# Patient Record
Sex: MALE | Race: WHITE | Employment: FULL TIME | URBAN - METROPOLITAN AREA
[De-identification: names, ages, dates, MRNs, and addresses within clinical notes are randomized per-mention and may not be internally consistent; named-entity substitution may affect disease eponyms.]

---

## 2018-10-13 ENCOUNTER — APPOINTMENT (OUTPATIENT)
Dept: GENERAL RADIOLOGY | Age: 48
DRG: 249 | End: 2018-10-13
Payer: COMMERCIAL

## 2018-10-13 ENCOUNTER — HOSPITAL ENCOUNTER (INPATIENT)
Age: 48
LOS: 3 days | Discharge: HOME | DRG: 249 | End: 2018-10-16
Attending: EMERGENCY MEDICINE | Admitting: INTERNAL MEDICINE
Payer: COMMERCIAL

## 2018-10-13 DIAGNOSIS — F17.200 TOBACCO USE DISORDER: ICD-10-CM

## 2018-10-13 DIAGNOSIS — M79.602 LEFT ARM PAIN: ICD-10-CM

## 2018-10-13 DIAGNOSIS — R77.8 ELEVATED TROPONIN: ICD-10-CM

## 2018-10-13 DIAGNOSIS — R07.9 CHEST PAIN, UNSPECIFIED TYPE: Primary | ICD-10-CM

## 2018-10-13 PROBLEM — I21.4 NSTEMI (NON-ST ELEVATED MYOCARDIAL INFARCTION) (HCC): Status: ACTIVE | Noted: 2018-10-13

## 2018-10-13 LAB
A/G RATIO: 1.5 (ref 1.1–2.2)
ALBUMIN SERPL-MCNC: 4.3 G/DL (ref 3.4–5)
ALP BLD-CCNC: 79 U/L (ref 40–129)
ALT SERPL-CCNC: 20 U/L (ref 10–40)
ANION GAP SERPL CALCULATED.3IONS-SCNC: 10 MMOL/L (ref 3–16)
AST SERPL-CCNC: 22 U/L (ref 15–37)
BASOPHILS ABSOLUTE: 0.1 K/UL (ref 0–0.2)
BASOPHILS RELATIVE PERCENT: 0.9 %
BILIRUB SERPL-MCNC: 0.3 MG/DL (ref 0–1)
BUN BLDV-MCNC: 16 MG/DL (ref 7–20)
CALCIUM SERPL-MCNC: 9.5 MG/DL (ref 8.3–10.6)
CHLORIDE BLD-SCNC: 101 MMOL/L (ref 99–110)
CO2: 29 MMOL/L (ref 21–32)
CREAT SERPL-MCNC: 0.8 MG/DL (ref 0.9–1.3)
EOSINOPHILS ABSOLUTE: 0.2 K/UL (ref 0–0.6)
EOSINOPHILS RELATIVE PERCENT: 1.4 %
GFR AFRICAN AMERICAN: >60
GFR NON-AFRICAN AMERICAN: >60
GLOBULIN: 2.9 G/DL
GLUCOSE BLD-MCNC: 91 MG/DL (ref 70–99)
HCT VFR BLD CALC: 41.5 % (ref 40.5–52.5)
HEMOGLOBIN: 14.2 G/DL (ref 13.5–17.5)
LYMPHOCYTES ABSOLUTE: 2 K/UL (ref 1–5.1)
LYMPHOCYTES RELATIVE PERCENT: 18 %
MAGNESIUM: 2.1 MG/DL (ref 1.8–2.4)
MCH RBC QN AUTO: 29.4 PG (ref 26–34)
MCHC RBC AUTO-ENTMCNC: 34.2 G/DL (ref 31–36)
MCV RBC AUTO: 85.9 FL (ref 80–100)
MONOCYTES ABSOLUTE: 0.6 K/UL (ref 0–1.3)
MONOCYTES RELATIVE PERCENT: 5.2 %
NEUTROPHILS ABSOLUTE: 8.2 K/UL (ref 1.7–7.7)
NEUTROPHILS RELATIVE PERCENT: 74.5 %
PDW BLD-RTO: 13.5 % (ref 12.4–15.4)
PHOSPHORUS: 3.1 MG/DL (ref 2.5–4.9)
PLATELET # BLD: 159 K/UL (ref 135–450)
PMV BLD AUTO: 9 FL (ref 5–10.5)
POTASSIUM SERPL-SCNC: 3.9 MMOL/L (ref 3.5–5.1)
RBC # BLD: 4.82 M/UL (ref 4.2–5.9)
SODIUM BLD-SCNC: 140 MMOL/L (ref 136–145)
TOTAL PROTEIN: 7.2 G/DL (ref 6.4–8.2)
TROPONIN: 0.04 NG/ML
TROPONIN: 0.13 NG/ML
TROPONIN: <0.01 NG/ML
WBC # BLD: 11 K/UL (ref 4–11)

## 2018-10-13 PROCEDURE — 84100 ASSAY OF PHOSPHORUS: CPT

## 2018-10-13 PROCEDURE — 36415 COLL VENOUS BLD VENIPUNCTURE: CPT

## 2018-10-13 PROCEDURE — 6370000000 HC RX 637 (ALT 250 FOR IP): Performed by: EMERGENCY MEDICINE

## 2018-10-13 PROCEDURE — 99285 EMERGENCY DEPT VISIT HI MDM: CPT

## 2018-10-13 PROCEDURE — 80053 COMPREHEN METABOLIC PANEL: CPT

## 2018-10-13 PROCEDURE — 84484 ASSAY OF TROPONIN QUANT: CPT

## 2018-10-13 PROCEDURE — 93005 ELECTROCARDIOGRAM TRACING: CPT | Performed by: NURSE PRACTITIONER

## 2018-10-13 PROCEDURE — 71046 X-RAY EXAM CHEST 2 VIEWS: CPT

## 2018-10-13 PROCEDURE — 6370000000 HC RX 637 (ALT 250 FOR IP): Performed by: NURSE PRACTITIONER

## 2018-10-13 PROCEDURE — 2580000003 HC RX 258: Performed by: EMERGENCY MEDICINE

## 2018-10-13 PROCEDURE — 93005 ELECTROCARDIOGRAM TRACING: CPT | Performed by: EMERGENCY MEDICINE

## 2018-10-13 PROCEDURE — 1200000000 HC SEMI PRIVATE

## 2018-10-13 PROCEDURE — 85025 COMPLETE CBC W/AUTO DIFF WBC: CPT

## 2018-10-13 PROCEDURE — 83735 ASSAY OF MAGNESIUM: CPT

## 2018-10-13 RX ORDER — ASPIRIN 81 MG/1
324 TABLET, CHEWABLE ORAL ONCE
Status: COMPLETED | OUTPATIENT
Start: 2018-10-13 | End: 2018-10-13

## 2018-10-13 RX ORDER — ACETAMINOPHEN 325 MG/1
650 TABLET ORAL ONCE
Status: COMPLETED | OUTPATIENT
Start: 2018-10-13 | End: 2018-10-13

## 2018-10-13 RX ORDER — ASPIRIN 81 MG/1
81 TABLET, CHEWABLE ORAL DAILY
Status: DISCONTINUED | OUTPATIENT
Start: 2018-10-14 | End: 2018-10-16 | Stop reason: HOSPADM

## 2018-10-13 RX ORDER — ATORVASTATIN CALCIUM 40 MG/1
40 TABLET, FILM COATED ORAL NIGHTLY
Status: DISCONTINUED | OUTPATIENT
Start: 2018-10-13 | End: 2018-10-16 | Stop reason: HOSPADM

## 2018-10-13 RX ORDER — NICOTINE 21 MG/24HR
1 PATCH, TRANSDERMAL 24 HOURS TRANSDERMAL DAILY
Status: DISCONTINUED | OUTPATIENT
Start: 2018-10-13 | End: 2018-10-13

## 2018-10-13 RX ORDER — SODIUM CHLORIDE 0.9 % (FLUSH) 0.9 %
10 SYRINGE (ML) INJECTION EVERY 12 HOURS SCHEDULED
Status: DISCONTINUED | OUTPATIENT
Start: 2018-10-13 | End: 2018-10-16 | Stop reason: HOSPADM

## 2018-10-13 RX ORDER — ONDANSETRON 2 MG/ML
4 INJECTION INTRAMUSCULAR; INTRAVENOUS EVERY 6 HOURS PRN
Status: DISCONTINUED | OUTPATIENT
Start: 2018-10-13 | End: 2018-10-16 | Stop reason: HOSPADM

## 2018-10-13 RX ORDER — NITROGLYCERIN 0.4 MG/1
0.4 TABLET SUBLINGUAL EVERY 5 MIN PRN
Status: DISCONTINUED | OUTPATIENT
Start: 2018-10-13 | End: 2018-10-16 | Stop reason: HOSPADM

## 2018-10-13 RX ORDER — 0.9 % SODIUM CHLORIDE 0.9 %
500 INTRAVENOUS SOLUTION INTRAVENOUS ONCE
Status: COMPLETED | OUTPATIENT
Start: 2018-10-13 | End: 2018-10-13

## 2018-10-13 RX ORDER — NITROGLYCERIN 0.4 MG/1
0.4 TABLET SUBLINGUAL ONCE
Status: COMPLETED | OUTPATIENT
Start: 2018-10-13 | End: 2018-10-13

## 2018-10-13 RX ORDER — ALPRAZOLAM 1 MG/1
1 TABLET ORAL NIGHTLY PRN
Status: DISCONTINUED | OUTPATIENT
Start: 2018-10-13 | End: 2018-10-14

## 2018-10-13 RX ORDER — SODIUM CHLORIDE 0.9 % (FLUSH) 0.9 %
10 SYRINGE (ML) INJECTION PRN
Status: DISCONTINUED | OUTPATIENT
Start: 2018-10-13 | End: 2018-10-16 | Stop reason: HOSPADM

## 2018-10-13 RX ORDER — CLOPIDOGREL BISULFATE 75 MG/1
300 TABLET ORAL ONCE
Status: DISCONTINUED | OUTPATIENT
Start: 2018-10-13 | End: 2018-10-16 | Stop reason: HOSPADM

## 2018-10-13 RX ORDER — MORPHINE SULFATE 2 MG/ML
2 INJECTION, SOLUTION INTRAMUSCULAR; INTRAVENOUS EVERY 4 HOURS PRN
Status: DISCONTINUED | OUTPATIENT
Start: 2018-10-13 | End: 2018-10-15 | Stop reason: SDUPTHER

## 2018-10-13 RX ORDER — ZOLPIDEM TARTRATE 5 MG/1
10 TABLET ORAL ONCE
Status: COMPLETED | OUTPATIENT
Start: 2018-10-14 | End: 2018-10-14

## 2018-10-13 RX ORDER — OXYCODONE AND ACETAMINOPHEN 7.5; 325 MG/1; MG/1
1 TABLET ORAL EVERY 6 HOURS PRN
Status: DISCONTINUED | OUTPATIENT
Start: 2018-10-13 | End: 2018-10-16 | Stop reason: HOSPADM

## 2018-10-13 RX ADMIN — ACETAMINOPHEN 650 MG: 325 TABLET ORAL at 22:38

## 2018-10-13 RX ADMIN — NITROGLYCERIN 0.4 MG: 0.4 TABLET, ORALLY DISINTEGRATING SUBLINGUAL at 18:40

## 2018-10-13 RX ADMIN — ASPIRIN 324 MG: 81 TABLET, CHEWABLE ORAL at 18:31

## 2018-10-13 RX ADMIN — NITROGLYCERIN 0.5 INCH: 20 OINTMENT TOPICAL at 21:58

## 2018-10-13 RX ADMIN — SODIUM CHLORIDE 500 ML: 9 INJECTION, SOLUTION INTRAVENOUS at 18:32

## 2018-10-13 ASSESSMENT — PAIN DESCRIPTION - PAIN TYPE: TYPE: ACUTE PAIN

## 2018-10-13 ASSESSMENT — PAIN DESCRIPTION - ORIENTATION: ORIENTATION: LEFT

## 2018-10-13 ASSESSMENT — PAIN SCALES - GENERAL
PAINLEVEL_OUTOF10: 0
PAINLEVEL_OUTOF10: 8
PAINLEVEL_OUTOF10: 0
PAINLEVEL_OUTOF10: 6

## 2018-10-13 ASSESSMENT — PAIN SCALES - WONG BAKER: WONGBAKER_NUMERICALRESPONSE: 0

## 2018-10-13 ASSESSMENT — PAIN DESCRIPTION - LOCATION: LOCATION: ARM

## 2018-10-13 NOTE — ED NOTES
Pt was given 1 nitro sublingual and pain dropped from 5 to 1 out of 10. . Dr Alycia Germain notified. . Pt with no needs at this time     Kimberly Velarde RN  10/13/18 0193

## 2018-10-13 NOTE — ED PROVIDER NOTES
[facility] Emergency Department    CHIEF COMPLAINT  Chest Pain (started 1530 while shopping. ) and Arm Pain (left arm)      HISTORY OF PRESENT ILLNESS  Antonieta Reyes is a 57 y.o. male who presents to the ED complaining of left anterior chest pain that radiates to left arm onset today while shopping. Patient eyes any dizziness or lightheadedness. Patient's wife at bedside reports he has been \"tired lately. \" Patient reports that with onset of symptoms he was \"seeing spots. \" Patient reports that pain has been intermittently in waves since about 3/3/30 this afternoon. Patient denies any headache. No fevers, chills, or episodes of diaphoresis. Patient denies any nausea, vomiting, or diarrhea. No unilateral weakness. Patient reports tingling and pain in left arm. Patient denies any neck or back pain. Patient reports feeling \"winded\" with symptoms. Patient denies any similar previous episodes. No abdominal pain/discomfort. No dysuria, hematuria, urinary urgency, frequency, or retention. Patient reports smoking 3/4 ppd. No other complaints, modifying factors or associated symptoms. Nursing notes reviewed. Past Medical History:   Diagnosis Date    Chronic pain syndrome     Current smoker     DDD (degenerative disc disease), lumbar     Facet joint syndrome ([facility] [address]     Hypertension     (does not take meds per wife as instructed)     IgA nephropathy     Insomnia     Neuropathic pain     Pain management contract agreement     with Dr. Stacey Hoskins     History reviewed. No pertinent surgical history. Family History   Problem Relation Age of Onset    High Blood Pressure Mother     High Blood Pressure Daughter      Social History     Social History    Marital status:      Spouse name: N/A    Number of children: N/A    Years of education: N/A     Occupational History    Not on file.      Social History Main Topics    Smoking status: Current Every Day Smoker # 0.2 0.0 - 0.6 K/uL    Basophils # 0.1 0.0 - 0.2 K/uL   Comprehensive Metabolic Panel   Result Value Ref Range    Sodium 140 136 - 145 mmol/L    Potassium 3.9 3.5 - 5.1 mmol/L    Chloride 101 99 - 110 mmol/L    CO2 29 21 - 32 mmol/L    Anion Gap 10 3 - 16    Glucose 91 70 - 99 mg/dL    BUN 16 7 - 20 mg/dL    CREATININE 0.8 (L) 0.9 - 1.3 mg/dL    GFR Non-African American >60 >60    GFR African American >60 >60    Calcium 9.5 8.3 - 10.6 mg/dL    Total Protein 7.2 6.4 - 8.2 g/dL    Alb 4.3 3.4 - 5.0 g/dL    Albumin/Globulin Ratio 1.5 1.1 - 2.2    Total Bilirubin 0.3 0.0 - 1.0 mg/dL    Alkaline Phosphatase 79 40 - 129 U/L    ALT 20 10 - 40 U/L    AST 22 15 - 37 U/L    Globulin 2.9 g/dL   Troponin   Result Value Ref Range    Troponin <0.01 <0.01 ng/mL   Magnesium   Result Value Ref Range    Magnesium 2.10 1.80 - 2.40 mg/dL   Phosphorus   Result Value Ref Range    Phosphorus 3.1 2.5 - 4.9 mg/dL   Troponin   Result Value Ref Range    Troponin 0.04 (H) <0.01 ng/mL   EKG 12 Lead   Result Value Ref Range    Ventricular Rate 47 BPM    Atrial Rate 47 BPM    P-R Interval 152 ms    QRS Duration 88 ms    Q-T Interval 452 ms    QTc Calculation (Bazett) 400 ms    P Axis 61 degrees    R Axis 30 degrees    T Axis 32 degrees    Diagnosis       Marked sinus bradycardiaAbnormal ECGWhen compared with ECG of 29-JUL-2014 03:39,Previous ECG has undetermined rhythm, needs review       I spoke with Dr. Pee Sher and Dr. Gemma Hamilton spoke with Dr. Bradley Montgomery with cardiology. We thoroughly discussed the history, physical exam, laboratory and imaging studies, as well as, emergency department course. Based upon that discussion, we've decided to admit Santos Nunez for further observation and evaluation of Elisha Doyne B Volkering's chest pain.   As I have deemed necessary from their history, physical, and studies, I have considered and evaluated Santos Nunez for the following diagnoses:  ACUTE CORONARY SYNDROME, PERICARDIAL TAMPONADE, PNEUMOTHORAX, PULMONARY EMBOLISM, and THORACIC DISSECTION. FINAL IMPRESSION  1. Chest pain, unspecified type    2. Elevated troponin    3. Tobacco use disorder    4. Left arm pain        Vitals:  Blood pressure 111/66, pulse 53, temperature 97.7 °F (36.5 °C), temperature source Oral, resp. rate 16, height 5' 8\" (1.727 m), weight 150 lb (68 kg), SpO2 100 %. DISPOSITION  Patient was admitted in stable good condition.           Eleanor Nelson, MARGUERITE - CNP  10/13/18 8125

## 2018-10-13 NOTE — ED PROVIDER NOTES
evidence of acute ischemia. No significant change from prior EKG dated earlier today       HEART SCORE:  History: +1 for moderate suspicion  EKG: +1 for nonspecific changes   Age: +1 for age 44-72 years  Risk factors (includes HLD, HTN, DM, tobacco use, obesity, and +FHx): +1 for 1-2 risk factors  Initial troponin: +0 for negative troponin    Heart score: 4. This falls under the following category: Score of 4-6, which indicates low/moderate risk for major adverse cardiac event and supports observation with repeated troponins and/or non-invasive testing      CXR - no acute dz    Lab reviewed - significant for first troponin < 0.01.  2nd troponin 0.04. CBC, CMP WNL. Patient ruled in. He needs admission for further evaluation. Patient initially did not want to be admitted and wanted to leave AMA. After prolonged discussion between myself, the patient and his wife, patient finally agreed to stay. The entire conversation was witnessed by nurse, Cathy Nixon. Patient's only request is that he gets a nicotine patch. NP spoke with hospitalist, Dr. Kaylee Lamas. Dr. Kaylee Lamas accepted the admission but requested we remove the nicotine patch. Cardiology consulted. I spoke with Dr. Karin Arriaza. Agree with admission. Also states patient can have the nicotine patch if that means the patient will stay. For further details of [address] emergency department encounter, please see Nathalie sOorio NP's documentation. This chart was generated in part by using Dragon Dictation system and may contain errors related to that system including errors in grammar, punctuation, and spelling, as well as words and phrases that may be inappropriate. If there are any questions or concerns please feel free to contact the dictating provider for clarification.           Zi Garcia MD  10/13/18 6309

## 2018-10-14 LAB
CHOLESTEROL, TOTAL: 168 MG/DL (ref 0–199)
EKG ATRIAL RATE: 46 BPM
EKG ATRIAL RATE: 47 BPM
EKG ATRIAL RATE: 51 BPM
EKG DIAGNOSIS: NORMAL
EKG P AXIS: 55 DEGREES
EKG P AXIS: 61 DEGREES
EKG P AXIS: 72 DEGREES
EKG P-R INTERVAL: 116 MS
EKG P-R INTERVAL: 146 MS
EKG P-R INTERVAL: 152 MS
EKG Q-T INTERVAL: 444 MS
EKG Q-T INTERVAL: 452 MS
EKG Q-T INTERVAL: 452 MS
EKG QRS DURATION: 88 MS
EKG QRS DURATION: 90 MS
EKG QRS DURATION: 90 MS
EKG QTC CALCULATION (BAZETT): 388 MS
EKG QTC CALCULATION (BAZETT): 400 MS
EKG QTC CALCULATION (BAZETT): 416 MS
EKG R AXIS: 30 DEGREES
EKG R AXIS: 48 DEGREES
EKG R AXIS: 67 DEGREES
EKG T AXIS: 28 DEGREES
EKG T AXIS: 32 DEGREES
EKG T AXIS: 63 DEGREES
EKG VENTRICULAR RATE: 46 BPM
EKG VENTRICULAR RATE: 47 BPM
EKG VENTRICULAR RATE: 51 BPM
HDLC SERPL-MCNC: 34 MG/DL (ref 40–60)
LDL CHOLESTEROL CALCULATED: 103 MG/DL
TRIGL SERPL-MCNC: 155 MG/DL (ref 0–150)
TROPONIN: 0.35 NG/ML
TROPONIN: 0.46 NG/ML
TROPONIN: 0.48 NG/ML
VLDLC SERPL CALC-MCNC: 31 MG/DL

## 2018-10-14 PROCEDURE — 99255 IP/OBS CONSLTJ NEW/EST HI 80: CPT | Performed by: INTERNAL MEDICINE

## 2018-10-14 PROCEDURE — 2580000003 HC RX 258: Performed by: INTERNAL MEDICINE

## 2018-10-14 PROCEDURE — 6370000000 HC RX 637 (ALT 250 FOR IP): Performed by: INTERNAL MEDICINE

## 2018-10-14 PROCEDURE — 84484 ASSAY OF TROPONIN QUANT: CPT

## 2018-10-14 PROCEDURE — 93005 ELECTROCARDIOGRAM TRACING: CPT | Performed by: INTERNAL MEDICINE

## 2018-10-14 PROCEDURE — 1200000000 HC SEMI PRIVATE

## 2018-10-14 PROCEDURE — 93010 ELECTROCARDIOGRAM REPORT: CPT | Performed by: INTERNAL MEDICINE

## 2018-10-14 PROCEDURE — 6360000002 HC RX W HCPCS: Performed by: INTERNAL MEDICINE

## 2018-10-14 PROCEDURE — 36415 COLL VENOUS BLD VENIPUNCTURE: CPT

## 2018-10-14 PROCEDURE — 80061 LIPID PANEL: CPT

## 2018-10-14 PROCEDURE — 6370000000 HC RX 637 (ALT 250 FOR IP): Performed by: NURSE PRACTITIONER

## 2018-10-14 RX ORDER — NICOTINE 21 MG/24HR
1 PATCH, TRANSDERMAL 24 HOURS TRANSDERMAL DAILY
Status: DISCONTINUED | OUTPATIENT
Start: 2018-10-14 | End: 2018-10-16 | Stop reason: HOSPADM

## 2018-10-14 RX ORDER — ZOLPIDEM TARTRATE 5 MG/1
10 TABLET ORAL NIGHTLY PRN
Status: DISCONTINUED | OUTPATIENT
Start: 2018-10-14 | End: 2018-10-16 | Stop reason: HOSPADM

## 2018-10-14 RX ORDER — ACETAMINOPHEN 325 MG/1
650 TABLET ORAL EVERY 4 HOURS PRN
Status: DISCONTINUED | OUTPATIENT
Start: 2018-10-14 | End: 2018-10-16 | Stop reason: HOSPADM

## 2018-10-14 RX ORDER — ZOLPIDEM TARTRATE 5 MG/1
10 TABLET ORAL NIGHTLY PRN
Status: DISCONTINUED | OUTPATIENT
Start: 2018-10-15 | End: 2018-10-14

## 2018-10-14 RX ADMIN — ZOLPIDEM TARTRATE 10 MG: 5 TABLET ORAL at 00:05

## 2018-10-14 RX ADMIN — OXYCODONE HYDROCHLORIDE AND ACETAMINOPHEN 1 TABLET: 7.5; 325 TABLET ORAL at 04:30

## 2018-10-14 RX ADMIN — ATORVASTATIN CALCIUM 40 MG: 40 TABLET, FILM COATED ORAL at 00:05

## 2018-10-14 RX ADMIN — SODIUM CHLORIDE, PRESERVATIVE FREE 10 ML: 5 INJECTION INTRAVENOUS at 00:05

## 2018-10-14 RX ADMIN — ENOXAPARIN SODIUM 70 MG: 80 INJECTION SUBCUTANEOUS at 22:01

## 2018-10-14 RX ADMIN — ENOXAPARIN SODIUM 70 MG: 80 INJECTION SUBCUTANEOUS at 13:50

## 2018-10-14 RX ADMIN — OXYCODONE HYDROCHLORIDE AND ACETAMINOPHEN 1 TABLET: 7.5; 325 TABLET ORAL at 22:01

## 2018-10-14 RX ADMIN — ZOLPIDEM TARTRATE 10 MG: 5 TABLET ORAL at 22:01

## 2018-10-14 RX ADMIN — OXYCODONE HYDROCHLORIDE AND ACETAMINOPHEN 1 TABLET: 7.5; 325 TABLET ORAL at 12:26

## 2018-10-14 RX ADMIN — SODIUM CHLORIDE, PRESERVATIVE FREE 10 ML: 5 INJECTION INTRAVENOUS at 07:52

## 2018-10-14 RX ADMIN — ENOXAPARIN SODIUM 70 MG: 80 INJECTION SUBCUTANEOUS at 00:05

## 2018-10-14 RX ADMIN — SODIUM CHLORIDE, PRESERVATIVE FREE 10 ML: 5 INJECTION INTRAVENOUS at 22:02

## 2018-10-14 RX ADMIN — ATORVASTATIN CALCIUM 40 MG: 40 TABLET, FILM COATED ORAL at 22:01

## 2018-10-14 RX ADMIN — ASPIRIN 81 MG: 81 TABLET, CHEWABLE ORAL at 07:52

## 2018-10-14 RX ADMIN — ACETAMINOPHEN 650 MG: 325 TABLET ORAL at 18:32

## 2018-10-14 ASSESSMENT — PAIN SCALES - GENERAL
PAINLEVEL_OUTOF10: 3
PAINLEVEL_OUTOF10: 0
PAINLEVEL_OUTOF10: 6
PAINLEVEL_OUTOF10: 4
PAINLEVEL_OUTOF10: 0
PAINLEVEL_OUTOF10: 0
PAINLEVEL_OUTOF10: 10
PAINLEVEL_OUTOF10: 0
PAINLEVEL_OUTOF10: 3
PAINLEVEL_OUTOF10: 0

## 2018-10-14 ASSESSMENT — PAIN DESCRIPTION - ONSET
ONSET: ON-GOING
ONSET: ON-GOING

## 2018-10-14 ASSESSMENT — PAIN DESCRIPTION - DESCRIPTORS
DESCRIPTORS: ACHING;SORE
DESCRIPTORS: ACHING;SORE

## 2018-10-14 ASSESSMENT — PAIN DESCRIPTION - FREQUENCY
FREQUENCY: CONTINUOUS
FREQUENCY: CONTINUOUS

## 2018-10-14 ASSESSMENT — PAIN DESCRIPTION - PAIN TYPE
TYPE: CHRONIC PAIN

## 2018-10-14 ASSESSMENT — PAIN DESCRIPTION - LOCATION
LOCATION: GENERALIZED

## 2018-10-14 ASSESSMENT — PAIN DESCRIPTION - PROGRESSION
CLINICAL_PROGRESSION: NOT CHANGED
CLINICAL_PROGRESSION: NOT CHANGED

## 2018-10-14 NOTE — CONSULTS
a current everyday cigarette smoker. He does not consume alcohol at this time. FAMILY HISTORY:  Remarkable for hypertension and coronary artery  disease in the mother. REVIEW OF SYSTEMS:  Demonstrates no fever or chills. He has had a 30  to 40 pound weight loss over the past several months, which he  attributes to working hard and eating less. He has not had near  syncope or syncope. He does not describe palpitations. His  functional status is excellent. All other components of the 10-system  review are negative. PHYSICAL EXAMINATION:  VITAL SIGNS:  Blood pressure is 102/83 and heart rate is 55 beats per  minute and regular. The patient is afebrile. GENERAL:  He is awake, alert, and oriented, and in no discomfort. HEENT:  Exam demonstrates normocephalic and atraumatic head. There is  no scleral icterus. Pupils are round and reactive. NECK:  Supple without thyromegaly. LUNGS:  Clear to auscultation. CARDIOVASCULAR:  Exam reveals a regular rhythm. The apical impulse is  discrete. S1 and S2 are normal.  There is no audible murmur or  gallop. The jugular venous pressure is difficult to assess. ABDOMEN:  The abdomen is lean, soft, and nontender. EXTREMITIES:  Demonstrate no pitting or pretibial dependent edema. There is no cyanosis. There is no evidence for chronic venous stasis. SKIN:  Otherwise, warm and dry without skin rash. DIAGNOSTIC DATA:  A 12-lead ECG from 10/14/2018 demonstrates sinus  bradycardia at 51 beats per minute. There are some minor early  repolarization changes, but no evidence for acute myocardial injury or  ischemia. IMPRESSION:  1.  Non-ST elevation myocardial infarction. 2.  Hypertension. 3.  Hyperlipidemia. 4.  Cigarette smoking. 5.  Family history of coronary artery disease. The patient presents with an episode of self-limiting chest pain on  10/13/2018. He does have an ascending trajectory for his troponin  levels.   He does not have ECG evidence

## 2018-10-14 NOTE — PROGRESS NOTES

## 2018-10-14 NOTE — ED NOTES
Pt aggreeable to stay. . Pt requested IV be moved to different site. . This RN removed IV and inserted new IV. Duane Bourdon Pt requested Nicotine patch. . NP notified and patch ordered and applied      Carolynn Jeff RN  10/13/18 0897

## 2018-10-14 NOTE — PLAN OF CARE
Problem: Pain:  Goal: Patient's pain/discomfort is manageable  Patient's pain/discomfort is manageable   Outcome: Met This Shift  Pt denies chest pain this shift. Encouraged to report pain to nursing staff as needed.

## 2018-10-15 LAB
APTT: 46.3 SEC (ref 26–36)
EKG ATRIAL RATE: 54 BPM
EKG DIAGNOSIS: NORMAL
EKG P AXIS: 55 DEGREES
EKG P-R INTERVAL: 146 MS
EKG Q-T INTERVAL: 430 MS
EKG QRS DURATION: 80 MS
EKG QTC CALCULATION (BAZETT): 407 MS
EKG R AXIS: 46 DEGREES
EKG T AXIS: 60 DEGREES
EKG VENTRICULAR RATE: 54 BPM
HCT VFR BLD CALC: 40.8 % (ref 40.5–52.5)
HEMOGLOBIN: 14.2 G/DL (ref 13.5–17.5)
LV EF: 55 %
LVEF MODALITY: NORMAL
MCH RBC QN AUTO: 29.7 PG (ref 26–34)
MCHC RBC AUTO-ENTMCNC: 34.9 G/DL (ref 31–36)
MCV RBC AUTO: 85.3 FL (ref 80–100)
PDW BLD-RTO: 13.4 % (ref 12.4–15.4)
PLATELET # BLD: 151 K/UL (ref 135–450)
PMV BLD AUTO: 9.8 FL (ref 5–10.5)
POC ACT LR: 165 SEC
POC ACT LR: 203 SEC
POC ACT LR: 271 SEC
RBC # BLD: 4.79 M/UL (ref 4.2–5.9)
TROPONIN: 0.72 NG/ML
TROPONIN: 0.73 NG/ML
WBC # BLD: 8.4 K/UL (ref 4–11)

## 2018-10-15 PROCEDURE — 2709999900 HC NON-CHARGEABLE SUPPLY

## 2018-10-15 PROCEDURE — 4A023N7 MEASUREMENT OF CARDIAC SAMPLING AND PRESSURE, LEFT HEART, PERCUTANEOUS APPROACH: ICD-10-PCS | Performed by: INTERNAL MEDICINE

## 2018-10-15 PROCEDURE — 2580000003 HC RX 258: Performed by: INTERNAL MEDICINE

## 2018-10-15 PROCEDURE — 2500000003 HC RX 250 WO HCPCS

## 2018-10-15 PROCEDURE — 93306 TTE W/DOPPLER COMPLETE: CPT

## 2018-10-15 PROCEDURE — 94664 DEMO&/EVAL PT USE INHALER: CPT

## 2018-10-15 PROCEDURE — C1725 CATH, TRANSLUMIN NON-LASER: HCPCS

## 2018-10-15 PROCEDURE — C1894 INTRO/SHEATH, NON-LASER: HCPCS

## 2018-10-15 PROCEDURE — 6360000004 HC RX CONTRAST MEDICATION: Performed by: INTERNAL MEDICINE

## 2018-10-15 PROCEDURE — 92928 PRQ TCAT PLMT NTRAC ST 1 LES: CPT | Performed by: INTERNAL MEDICINE

## 2018-10-15 PROCEDURE — 93458 L HRT ARTERY/VENTRICLE ANGIO: CPT | Performed by: INTERNAL MEDICINE

## 2018-10-15 PROCEDURE — 02703DZ DILATION OF CORONARY ARTERY, ONE ARTERY WITH INTRALUMINAL DEVICE, PERCUTANEOUS APPROACH: ICD-10-PCS | Performed by: INTERNAL MEDICINE

## 2018-10-15 PROCEDURE — 6370000000 HC RX 637 (ALT 250 FOR IP): Performed by: INTERNAL MEDICINE

## 2018-10-15 PROCEDURE — 84484 ASSAY OF TROPONIN QUANT: CPT

## 2018-10-15 PROCEDURE — 92941 PRQ TRLML REVSC TOT OCCL AMI: CPT | Performed by: INTERNAL MEDICINE

## 2018-10-15 PROCEDURE — 2060000000 HC ICU INTERMEDIATE R&B

## 2018-10-15 PROCEDURE — 99152 MOD SED SAME PHYS/QHP 5/>YRS: CPT | Performed by: INTERNAL MEDICINE

## 2018-10-15 PROCEDURE — 6360000002 HC RX W HCPCS: Performed by: INTERNAL MEDICINE

## 2018-10-15 PROCEDURE — C1769 GUIDE WIRE: HCPCS

## 2018-10-15 PROCEDURE — B2111ZZ FLUOROSCOPY OF MULTIPLE CORONARY ARTERIES USING LOW OSMOLAR CONTRAST: ICD-10-PCS | Performed by: INTERNAL MEDICINE

## 2018-10-15 PROCEDURE — 85347 COAGULATION TIME ACTIVATED: CPT

## 2018-10-15 PROCEDURE — C1887 CATHETER, GUIDING: HCPCS

## 2018-10-15 PROCEDURE — 99406 BEHAV CHNG SMOKING 3-10 MIN: CPT

## 2018-10-15 PROCEDURE — 85027 COMPLETE CBC AUTOMATED: CPT

## 2018-10-15 PROCEDURE — 6370000000 HC RX 637 (ALT 250 FOR IP)

## 2018-10-15 PROCEDURE — 6360000002 HC RX W HCPCS

## 2018-10-15 PROCEDURE — 93005 ELECTROCARDIOGRAM TRACING: CPT | Performed by: INTERNAL MEDICINE

## 2018-10-15 PROCEDURE — 36415 COLL VENOUS BLD VENIPUNCTURE: CPT

## 2018-10-15 PROCEDURE — 6370000000 HC RX 637 (ALT 250 FOR IP): Performed by: NURSE PRACTITIONER

## 2018-10-15 PROCEDURE — C1876 STENT, NON-COA/NON-COV W/DEL: HCPCS

## 2018-10-15 PROCEDURE — 85730 THROMBOPLASTIN TIME PARTIAL: CPT

## 2018-10-15 RX ORDER — EPTIFIBATIDE 0.75 MG/ML
2 INJECTION, SOLUTION INTRAVENOUS CONTINUOUS
Status: ACTIVE | OUTPATIENT
Start: 2018-10-15 | End: 2018-10-15

## 2018-10-15 RX ORDER — HEPARIN SODIUM 1000 [USP'U]/ML
4000 INJECTION, SOLUTION INTRAVENOUS; SUBCUTANEOUS ONCE
Status: COMPLETED | OUTPATIENT
Start: 2018-10-15 | End: 2018-10-15

## 2018-10-15 RX ORDER — HEPARIN SODIUM 1000 [USP'U]/ML
2000 INJECTION, SOLUTION INTRAVENOUS; SUBCUTANEOUS PRN
Status: DISCONTINUED | OUTPATIENT
Start: 2018-10-15 | End: 2018-10-15

## 2018-10-15 RX ORDER — SODIUM CHLORIDE 9 MG/ML
INJECTION, SOLUTION INTRAVENOUS CONTINUOUS
Status: ACTIVE | OUTPATIENT
Start: 2018-10-15 | End: 2018-10-15

## 2018-10-15 RX ORDER — HEPARIN SODIUM 1000 [USP'U]/ML
4000 INJECTION, SOLUTION INTRAVENOUS; SUBCUTANEOUS PRN
Status: DISCONTINUED | OUTPATIENT
Start: 2018-10-15 | End: 2018-10-15

## 2018-10-15 RX ORDER — CLOPIDOGREL 300 MG/1
600 TABLET, FILM COATED ORAL ONCE
Status: DISCONTINUED | OUTPATIENT
Start: 2018-10-15 | End: 2018-10-16 | Stop reason: HOSPADM

## 2018-10-15 RX ORDER — MORPHINE SULFATE 2 MG/ML
2 INJECTION, SOLUTION INTRAMUSCULAR; INTRAVENOUS EVERY 4 HOURS PRN
Status: DISCONTINUED | OUTPATIENT
Start: 2018-10-15 | End: 2018-10-16 | Stop reason: HOSPADM

## 2018-10-15 RX ORDER — CLOPIDOGREL BISULFATE 75 MG/1
75 TABLET ORAL DAILY
Status: DISCONTINUED | OUTPATIENT
Start: 2018-10-16 | End: 2018-10-16 | Stop reason: HOSPADM

## 2018-10-15 RX ADMIN — HEPARIN SODIUM 12 UNITS/KG/HR: 10000 INJECTION, SOLUTION INTRAVENOUS at 03:26

## 2018-10-15 RX ADMIN — ZOLPIDEM TARTRATE 10 MG: 5 TABLET ORAL at 22:10

## 2018-10-15 RX ADMIN — ASPIRIN 325 MG: 81 TABLET, CHEWABLE ORAL at 09:55

## 2018-10-15 RX ADMIN — EPTIFIBATIDE 2 MCG/KG/MIN: 0.75 INJECTION, SOLUTION INTRAVENOUS at 14:56

## 2018-10-15 RX ADMIN — HEPARIN SODIUM 4000 UNITS: 1000 INJECTION INTRAVENOUS; SUBCUTANEOUS at 03:27

## 2018-10-15 RX ADMIN — ATORVASTATIN CALCIUM 40 MG: 40 TABLET, FILM COATED ORAL at 22:10

## 2018-10-15 RX ADMIN — IOVERSOL 240 ML: 741 INJECTION INTRA-ARTERIAL; INTRAVENOUS at 09:43

## 2018-10-15 RX ADMIN — OXYCODONE HYDROCHLORIDE AND ACETAMINOPHEN 1 TABLET: 7.5; 325 TABLET ORAL at 22:10

## 2018-10-15 RX ADMIN — SODIUM CHLORIDE, PRESERVATIVE FREE 10 ML: 5 INJECTION INTRAVENOUS at 22:11

## 2018-10-15 RX ADMIN — ACETAMINOPHEN 650 MG: 325 TABLET ORAL at 15:45

## 2018-10-15 ASSESSMENT — PAIN SCALES - GENERAL
PAINLEVEL_OUTOF10: 0
PAINLEVEL_OUTOF10: 8
PAINLEVEL_OUTOF10: 0
PAINLEVEL_OUTOF10: 8
PAINLEVEL_OUTOF10: 1
PAINLEVEL_OUTOF10: 4

## 2018-10-15 ASSESSMENT — PAIN DESCRIPTION - LOCATION
LOCATION: HEAD
LOCATION: BACK

## 2018-10-15 ASSESSMENT — PAIN DESCRIPTION - PAIN TYPE
TYPE: ACUTE PAIN
TYPE: CHRONIC PAIN

## 2018-10-15 ASSESSMENT — PAIN DESCRIPTION - ORIENTATION: ORIENTATION: LEFT

## 2018-10-15 ASSESSMENT — PAIN DESCRIPTION - DESCRIPTORS: DESCRIPTORS: HEADACHE

## 2018-10-15 NOTE — PROCEDURES
Brief Pre-Op Note/Sedation Assessment      Celia Saldivar  1970  [id number]  [id number]  7:55 AM    Planned Procedure: Cardiac Catheterization Procedure    Post Procedure Plan: Return to same level of care    Consent: I have discussed with the patient and/or the patient representative the indication, alternatives, and the possible risks and/or complications of the planned procedure and the anesthesia methods. The patient and/or patient representative appear to understand and agree to proceed. Chief Complaint: Chest Pain/Pressure  NSTEMI      Indications for the Procedure:   CAD Presentation:  ACS <= 24 hrs  Anginal Classification within 2 weeks:  CCS IV - Inability to perform any activity without angina or angina at rest, i.e., severe limitation  NYHA Heart Failure Class within 2 weeks: No symptoms      Anti- Anginal Meds within 2 weeks:   ANTI-ANGINAL MEDS:No    Stress or Imaging Studies Performed:  None    Vital Signs:  /83   Pulse 56   Temp 97.7 °F (36.5 °C) (Oral)   Resp 14   Ht 5' 8\" (1.727 m)   Wt 154 lb 9.6 oz (70.1 kg)   SpO2 99%   BMI 23.51 kg/m²     Allergies: Allergies   Allergen Reactions    Penicillins        Past Medical History:  Past Medical History:   Diagnosis Date    Chest pain 10/13/2018    + Troponins    Chronic pain syndrome     Current smoker     DDD (degenerative disc disease), lumbar     Facet joint syndrome ([facility])     High cholesterol     Hypertension     (does not take meds per wife as instructed)     IgA nephropathy     Insomnia     Neuropathic pain     Pain management contract agreement     with Dr. Rose Lancaster         Surgical History:  History reviewed. No pertinent surgical history.       Medications:  Current Facility-Administered Medications   Medication Dose Route Frequency Provider Last Rate Last Dose    heparin (porcine) injection 4,000 Units  4,000 Units Intravenous PRN Leonora Rose MD        heparin (porcine) injection 2,000

## 2018-10-16 VITALS
OXYGEN SATURATION: 97 % | DIASTOLIC BLOOD PRESSURE: 72 MMHG | TEMPERATURE: 97.97 F | SYSTOLIC BLOOD PRESSURE: 127 MMHG | HEIGHT: 68 IN | RESPIRATION RATE: 16 BRPM | WEIGHT: 150.4 LBS | HEART RATE: 54 BPM | BODY MASS INDEX: 22.79 KG/M2

## 2018-10-16 LAB
ANION GAP SERPL CALCULATED.3IONS-SCNC: 8 MMOL/L (ref 3–16)
BUN BLDV-MCNC: 14 MG/DL (ref 7–20)
CALCIUM SERPL-MCNC: 9.2 MG/DL (ref 8.3–10.6)
CHLORIDE BLD-SCNC: 103 MMOL/L (ref 99–110)
CO2: 28 MMOL/L (ref 21–32)
CREAT SERPL-MCNC: 0.9 MG/DL (ref 0.9–1.3)
GFR AFRICAN AMERICAN: >60
GFR NON-AFRICAN AMERICAN: >60
GLUCOSE BLD-MCNC: 86 MG/DL (ref 70–99)
HCT VFR BLD CALC: 41.9 % (ref 40.5–52.5)
HEMOGLOBIN: 14.4 G/DL (ref 13.5–17.5)
MCH RBC QN AUTO: 29.4 PG (ref 26–34)
MCHC RBC AUTO-ENTMCNC: 34.4 G/DL (ref 31–36)
MCV RBC AUTO: 85.5 FL (ref 80–100)
PDW BLD-RTO: 13.5 % (ref 12.4–15.4)
PLATELET # BLD: 146 K/UL (ref 135–450)
PMV BLD AUTO: 8.5 FL (ref 5–10.5)
POTASSIUM REFLEX MAGNESIUM: 4 MMOL/L (ref 3.5–5.1)
RBC # BLD: 4.9 M/UL (ref 4.2–5.9)
SODIUM BLD-SCNC: 139 MMOL/L (ref 136–145)
WBC # BLD: 7.4 K/UL (ref 4–11)

## 2018-10-16 PROCEDURE — 6370000000 HC RX 637 (ALT 250 FOR IP): Performed by: INTERNAL MEDICINE

## 2018-10-16 PROCEDURE — 85027 COMPLETE CBC AUTOMATED: CPT

## 2018-10-16 PROCEDURE — 36415 COLL VENOUS BLD VENIPUNCTURE: CPT

## 2018-10-16 PROCEDURE — 80048 BASIC METABOLIC PNL TOTAL CA: CPT

## 2018-10-16 PROCEDURE — 99233 SBSQ HOSP IP/OBS HIGH 50: CPT | Performed by: NURSE PRACTITIONER

## 2018-10-16 PROCEDURE — 2580000003 HC RX 258: Performed by: INTERNAL MEDICINE

## 2018-10-16 RX ORDER — ASPIRIN 81 MG/1
81 TABLET, CHEWABLE ORAL DAILY
Qty: 30 TABLET | Refills: 11 | Status: SHIPPED | OUTPATIENT
Start: 2018-10-17 | End: 2019-10-11 | Stop reason: SDUPTHER

## 2018-10-16 RX ORDER — CLOPIDOGREL BISULFATE 75 MG/1
75 TABLET ORAL DAILY
Qty: 30 TABLET | Refills: 11 | Status: SHIPPED | OUTPATIENT
Start: 2018-10-17 | End: 2019-10-18 | Stop reason: SDUPTHER

## 2018-10-16 RX ORDER — ATORVASTATIN CALCIUM 40 MG/1
40 TABLET, FILM COATED ORAL NIGHTLY
Qty: 30 TABLET | Refills: 5 | Status: SHIPPED | OUTPATIENT
Start: 2018-10-16 | End: 2018-10-22 | Stop reason: ALTCHOICE

## 2018-10-16 RX ORDER — NITROGLYCERIN 0.4 MG/1
TABLET SUBLINGUAL
Qty: 25 TABLET | Refills: 3 | Status: SHIPPED | OUTPATIENT
Start: 2018-10-16

## 2018-10-16 RX ADMIN — CLOPIDOGREL BISULFATE 75 MG: 75 TABLET ORAL at 08:18

## 2018-10-16 RX ADMIN — OXYCODONE HYDROCHLORIDE AND ACETAMINOPHEN 1 TABLET: 7.5; 325 TABLET ORAL at 08:26

## 2018-10-16 RX ADMIN — ASPIRIN 81 MG: 81 TABLET, CHEWABLE ORAL at 08:18

## 2018-10-16 RX ADMIN — SODIUM CHLORIDE, PRESERVATIVE FREE 10 ML: 5 INJECTION INTRAVENOUS at 08:18

## 2018-10-16 ASSESSMENT — PAIN DESCRIPTION - PAIN TYPE: TYPE: ACUTE PAIN

## 2018-10-16 ASSESSMENT — PAIN SCALES - GENERAL
PAINLEVEL_OUTOF10: 7
PAINLEVEL_OUTOF10: 7

## 2018-10-16 ASSESSMENT — PAIN DESCRIPTION - ORIENTATION: ORIENTATION: LEFT

## 2018-10-16 ASSESSMENT — PAIN DESCRIPTION - LOCATION: LOCATION: BACK

## 2018-10-16 NOTE — PROGRESS NOTES
Pt d/c'd home. Removed right hand IV and stopped bleeding. Catheter intact. Pt tolerated well. No redness noted at site. Notified [facility] and removed tele box. Reviewed d/c instructions, home meds, and  f/u information utilizing teach-back method. Patient verbalized understanding.

## 2018-10-16 NOTE — DISCHARGE SUMMARY
extraction  2. For now, follow ostial OM1. R>B to PCI give proximity to large OM2,3  3. Follow ostial Diag 1- likely cutting balloon in future  4. Integrillin for 12 hrs  5. ASA 81mg poqday for life  6. Plavix 75mg po qday for 1 month w/o inturruption, ideally for 1 yr as possible  7.  statin as tolerated. No BB due to bradycardia- watch as outpt to start  Monitor overnight.     Smoking - Counseled Cessation, would hold off any nicotine replacement products given possible MI     Chronic pain syndrome - due to DDD, arthritis, resume Percocet when necessary     Anxiety - on when necessary Xanax      Opioid dependence in the setting of chronic pain syndrome d/t DDD and arthritis, taking Percocet at home, being treated with Percocet when necessary in hospital.    Physical Exam Performed:     /72   Pulse 54   Temp 97.97 °F (36.7 °C) (Oral)   Resp 16   Ht 5' 8\" (1.727 m)   Wt 150 lb 6.4 oz (68.2 kg)   SpO2 97%   BMI 22.87 kg/m²       General appearance:  No apparent distress, appears stated age and cooperative. HEENT:  Normal cephalic, atraumatic without obvious deformity. Pupils equal, round, and reactive to light. Extra ocular muscles intact. Conjunctivae/corneas clear. Neck: Supple, with full range of motion. No jugular venous distention. Trachea midline. Respiratory:  Normal respiratory effort. Clear to auscultation, bilaterally without Rales/Wheezes/Rhonchi. Cardiovascular:  Regular rate and rhythm with normal S1/S2 without murmurs, rubs or gallops. Abdomen: Soft, non-tender, non-distended with normal bowel sounds. Musculoskeletal:  No clubbing, cyanosis or edema bilaterally. Full range of motion without deformity. Skin: Skin color, texture, turgor normal.  No rashes or lesions. Neurologic:  Neurovascularly intact without any focal sensory/motor deficits.  Cranial nerves: II-XII intact, grossly non-focal.  Psychiatric:  Alert and oriented, thought content appropriate, normal insight  Capillary nightly as needed for Sleep. Qty: 30 tablet, Refills: 1             Time Spent on discharge is more than 30 minutes in the examination, evaluation, counseling and review of medications and discharge plan. Signed:    Sue Ludwig MD   10/16/2018      Thank you Vincent Pozo MD for the opportunity to be involved in this patient's care. If you have any questions or concerns please feel free to contact me at [phone].

## 2018-10-22 ENCOUNTER — OFFICE VISIT (OUTPATIENT)
Dept: CARDIOLOGY CLINIC | Age: 48
End: 2018-10-22
Payer: COMMERCIAL

## 2018-10-22 VITALS
HEIGHT: 68 IN | BODY MASS INDEX: 23.52 KG/M2 | HEART RATE: 70 BPM | WEIGHT: 155.2 LBS | OXYGEN SATURATION: 98 % | DIASTOLIC BLOOD PRESSURE: 66 MMHG | SYSTOLIC BLOOD PRESSURE: 100 MMHG

## 2018-10-22 DIAGNOSIS — E78.00 PURE HYPERCHOLESTEROLEMIA: ICD-10-CM

## 2018-10-22 DIAGNOSIS — I25.10 CORONARY ARTERY DISEASE INVOLVING NATIVE CORONARY ARTERY OF NATIVE HEART WITHOUT ANGINA PECTORIS: ICD-10-CM

## 2018-10-22 DIAGNOSIS — I21.4 NON-ST ELEVATION MYOCARDIAL INFARCTION (NSTEMI), SUBSEQUENT EPISODE OF CARE (HCC): Primary | ICD-10-CM

## 2018-10-22 DIAGNOSIS — I10 ESSENTIAL HYPERTENSION: ICD-10-CM

## 2018-10-22 PROCEDURE — G8427 DOCREV CUR MEDS BY ELIG CLIN: HCPCS | Performed by: NURSE PRACTITIONER

## 2018-10-22 PROCEDURE — G8598 ASA/ANTIPLAT THER USED: HCPCS | Performed by: NURSE PRACTITIONER

## 2018-10-22 PROCEDURE — G8484 FLU IMMUNIZE NO ADMIN: HCPCS | Performed by: NURSE PRACTITIONER

## 2018-10-22 PROCEDURE — 99213 OFFICE O/P EST LOW 20 MIN: CPT | Performed by: NURSE PRACTITIONER

## 2018-10-22 PROCEDURE — G8420 CALC BMI NORM PARAMETERS: HCPCS | Performed by: NURSE PRACTITIONER

## 2018-10-22 PROCEDURE — 1111F DSCHRG MED/CURRENT MED MERGE: CPT | Performed by: NURSE PRACTITIONER

## 2018-10-22 PROCEDURE — 1036F TOBACCO NON-USER: CPT | Performed by: NURSE PRACTITIONER

## 2018-10-22 RX ORDER — ROSUVASTATIN CALCIUM 40 MG/1
40 TABLET, COATED ORAL EVERY EVENING
Status: ON HOLD | COMMUNITY
End: 2019-08-20

## 2018-10-22 NOTE — LETTER
[address] Cardiology - [facility] [place] [address] [address] [address]  Phone: [phone]  Fax: [phone] [facility], APRN - CNP        October 24, 2018     Yan Rdz MD  [address] [facility] [address] [place] [address]    Patient: Candy Cruz  MR Number: [id number]  YOB: 1970  Date of Visit: 10/22/2018    Dear Dr. Yan Rdz:    Below are the relevant portions of my assessment and plan of care. [address]   Cardiac Evaluation    Primary Care Doctor:  Yan Rdz MD    Chief Complaint   Patient presents with    New Patient    Follow-Up from [facility]    Coronary Artery Disease    Hypertension        History of Present Illness:   I had the pleasure of seeing Candy Cruz in follow up for recent hospitalization. Candy Cruz was admitted 10/13/18 with chest pain, hx HTN, family hx and smoker. ECG negative for ischemia. Troponins positive. Started on Heparin, ASA, statin. LHC on 10/15/18 with PCI to Diag 1 with BMS (requires spinal injections). Also has significant disease in OM1. His PCP changed the Lipitor to Crestor. He also ordered carotid u/s. He is to see pain doctor tomorrow, undergoing radiofrequency to ablate nerve endings to treat pain. He has serial epidural injections in between. He remains a non-smoker since hospital discharge. Candy Cruz describes symptoms including fatigue but denies chest pain, dyspnea, palpitations, orthopnea, PND, early saiety, edema, syncope. Past Medical History:   has a past medical history of Chest pain; Chronic pain syndrome; Current smoker; DDD (degenerative disc disease), lumbar; Facet joint syndrome ([address] High cholesterol; Hypertension; IgA nephropathy; Insomnia; Neuropathic pain; NSTEMI (non-ST elevated myocardial infarction) ([address] and Pain management contract agreement.   Surgical History:   has a past surgical history that includes Percutaneous Transluminal Coronary Angio (10/15/2018). Social History:   reports that he quit smoking 9 days ago. His smoking use included Cigarettes. He has a 10.00 pack-year smoking history. He has never used smokeless tobacco. He reports that he does not drink alcohol or use drugs. Family History:   Family History   Problem Relation Age of Onset    High Blood Pressure Mother     High Blood Pressure Daughter        Home Medications:  Prior to Admission medications    Medication Sig Start Date End Date Taking? Authorizing Provider   rosuvastatin (CRESTOR) 40 MG tablet Take 40 mg by mouth every evening   Yes Historical Provider, MD   aspirin 81 MG chewable tablet Take 1 tablet by mouth daily 10/17/18  Yes MARGUERITE Darling CNP   nitroGLYCERIN (NITROSTAT) 0.4 MG SL tablet up to max of 3 total doses. If no relief after 1 dose, call 911. 10/16/18  Yes MARGUERITE Sarabia CNP   clopidogrel (PLAVIX) 75 MG tablet Take 1 tablet by mouth daily 10/17/18  Yes MARGUERITE Kyle CNP   ALPRAZolam Alvah Cruel) 1 MG tablet Take 1 mg by mouth nightly as needed for Sleep. Yes Historical Provider, MD   oxyCODONE-acetaminophen (PERCOCET) 7.5-325 MG per tablet Take 1 tablet by mouth every 6 hours as needed for Pain. Max 3 a day 2/18/14  Yes Gwen Penny MD   LIDODERM 5 % APPLY ONE PATCH TO SKIN 12 HOURS ON AND 12 HOURS OFF 11/16/13  Yes Gwen Penny MD   zolpidem (AMBIEN CR) 12.5 MG CR tablet Take 1 tablet by mouth nightly as needed for Sleep. Patient taking differently: Take 10 mg by mouth nightly as needed for Sleep. 10/28/13  Yes Gwen Penny MD   atorvastatin (LIPITOR) 40 MG tablet Take 1 tablet by mouth nightly 10/16/18   MARGUERITE Sarabia CNP        Allergies:  Penicillins     Review of Systems:   · Constitutional: there has been no unanticipated weight loss. · Eyes: No vision changes  · ENT: No Headaches, no nasal congestion. No mouth sores or sore throat.   · Cardiovascular: Reviewed in HPI · Respiratory: No cough or wheezing, no sputum production. · Gastrointestinal: No abdominal pain, no constipation or diarrhea  · Genitourinary: No dysuria, trouble voiding, or hematuria. · Musculoskeletal:  No weakness or joint complaints. · Integumentary: No rash or pruritis. · Neurological: No numbness or tingling. No weakness. No tremor. · Psychiatric: No anxiety, no depression. · Endocrine:  No excessive thirst or urination. · Hematologic/Lymphatic: No abnormal bruising or bleeding, blood clots or swollen lymph nodes. Physical Examination:    Vitals:    10/22/18 1004   BP: 100/66   Pulse: 70   SpO2: 98%   Weight: 155 lb 3.2 oz (70.4 kg)   Height: 5' 8\" (1.727 m)        Constitutional and General Appearance: Warm and dry, no apparent distress, normal coloration  HEENT:  Normocephalic, atraumatic  Respiratory:  · Normal excursion and expansion without use of accessory muscles  · Resp Auscultation: Normal breath sounds without dullness  Cardiovascular:  · The apical impulses not displaced  · Heart tones are crisp and normal  · JVP 8 cm H2O  · Regular rate and rhythm, normal S1S2, no m/g/r  · Peripheral pulses are symmetrical and full  · There is no clubbing, cyanosis of the extremities.   · No BLE edema  · Pedal Pulses: 2+ and equal     · right radial site without ooze, bruise or hematoma, 2+ pulse; right forearm with redness and warmth (though unchanged from left forearm)  Abdomen:  · No masses or tenderness  · Liver/Spleen: No Abnormalities Noted  Neurological/Psychiatric:  · Alert and oriented in all spheres  · Moves all extremities well  · Exhibits normal gait balance and coordination  · No abnormalities of mood, affect, memory, mentation, or behavior are noted    Lab Data:  CBC:   Lab Results   Component Value Date    WBC 7.4 10/16/2018    WBC 8.4 10/15/2018    WBC 11.0 10/13/2018    RBC 4.90 10/16/2018    RBC 4.79 10/15/2018    RBC 4.82 10/13/2018    HGB 14.4 10/16/2018    HGB 14.2 10/15/2018

## 2018-10-22 NOTE — PROGRESS NOTES
abnormal bruising or bleeding, blood clots or swollen lymph nodes. Physical Examination:    Vitals:    10/22/18 1004   BP: 100/66   Pulse: 70   SpO2: 98%   Weight: 155 lb 3.2 oz (70.4 kg)   Height: 5' 8\" (1.727 m)        Constitutional and General Appearance: Warm and dry, no apparent distress, normal coloration  HEENT:  Normocephalic, atraumatic  Respiratory:  · Normal excursion and expansion without use of accessory muscles  · Resp Auscultation: Normal breath sounds without dullness  Cardiovascular:  · The apical impulses not displaced  · Heart tones are crisp and normal  · JVP 8 cm H2O  · Regular rate and rhythm, normal S1S2, no m/g/r  · Peripheral pulses are symmetrical and full  · There is no clubbing, cyanosis of the extremities.   · No BLE edema  · Pedal Pulses: 2+ and equal     · right radial site without ooze, bruise or hematoma, 2+ pulse; right forearm with redness and warmth (though unchanged from left forearm)  Abdomen:  · No masses or tenderness  · Liver/Spleen: No Abnormalities Noted  Neurological/Psychiatric:  · Alert and oriented in all spheres  · Moves all extremities well  · Exhibits normal gait balance and coordination  · No abnormalities of mood, affect, memory, mentation, or behavior are noted    Lab Data:  CBC:   Lab Results   Component Value Date    WBC 7.4 10/16/2018    WBC 8.4 10/15/2018    WBC 11.0 10/13/2018    RBC 4.90 10/16/2018    RBC 4.79 10/15/2018    RBC 4.82 10/13/2018    HGB 14.4 10/16/2018    HGB 14.2 10/15/2018    HGB 14.2 10/13/2018    HCT 41.9 10/16/2018    HCT 40.8 10/15/2018    HCT 41.5 10/13/2018    MCV 85.5 10/16/2018    MCV 85.3 10/15/2018    MCV 85.9 10/13/2018    RDW 13.5 10/16/2018    RDW 13.4 10/15/2018    RDW 13.5 10/13/2018     10/16/2018     10/15/2018     10/13/2018     BMP:  Lab Results   Component Value Date     10/16/2018     10/13/2018     03/06/2016    K 4.0 10/16/2018    K 3.9 10/13/2018    K 4.4 03/06/2016    K 4.4

## 2018-10-24 NOTE — COMMUNICATION BODY
[employer]   Cardiac Evaluation    Primary Care Doctor:  Lorraine Duffy MD    Chief Complaint   Patient presents with    New Patient    Follow-Up from [facility]    Coronary Artery Disease    Hypertension        History of Present Illness:   I had the pleasure of seeing Coby Ca in follow up for recent hospitalization. Coby Ca was admitted 10/13/18 with chest pain, hx HTN, family hx and smoker. ECG negative for ischemia. Troponins positive. Started on Heparin, ASA, statin. LHC on 10/15/18 with PCI to Diag 1 with BMS (requires spinal injections). Also has significant disease in OM1. His PCP changed the Lipitor to Crestor. He also ordered carotid u/s. He is to see pain doctor tomorrow, undergoing radiofrequency to ablate nerve endings to treat pain. He has serial epidural injections in between. He remains a non-smoker since hospital discharge. Coby Ca describes symptoms including fatigue but denies chest pain, dyspnea, palpitations, orthopnea, PND, early saiety, edema, syncope. Past Medical History:   has a past medical history of Chest pain; Chronic pain syndrome; Current smoker; DDD (degenerative disc disease), lumbar; Facet joint syndrome ([address] High cholesterol; Hypertension; IgA nephropathy; Insomnia; Neuropathic pain; NSTEMI (non-ST elevated myocardial infarction) ([address] and Pain management contract agreement. Surgical History:   has a past surgical history that includes Percutaneous Transluminal Coronary Angio (10/15/2018). Social History:   reports that he quit smoking 9 days ago. His smoking use included Cigarettes. He has a 10.00 pack-year smoking history. He has never used smokeless tobacco. He reports that he does not drink alcohol or use drugs.    Family History:   Family History   Problem Relation Age of Onset    High Blood Pressure Mother     High Blood Pressure Daughter        Home Medications:  Prior to Admission medications Medication Sig Start Date End Date Taking? Authorizing Provider   rosuvastatin (CRESTOR) 40 MG tablet Take 40 mg by mouth every evening   Yes Historical Provider, MD   aspirin 81 MG chewable tablet Take 1 tablet by mouth daily 10/17/18  Yes MARGUERITE Darling CNP   nitroGLYCERIN (NITROSTAT) 0.4 MG SL tablet up to max of 3 total doses. If no relief after 1 dose, call 911. 10/16/18  Yes MARGUERITE Livingston CNP   clopidogrel (PLAVIX) 75 MG tablet Take 1 tablet by mouth daily 10/17/18  Yes MARGUERITE Madsen CNP   ALPRAZolam Perry Medin) 1 MG tablet Take 1 mg by mouth nightly as needed for Sleep. Yes Historical Provider, MD   oxyCODONE-acetaminophen (PERCOCET) 7.5-325 MG per tablet Take 1 tablet by mouth every 6 hours as needed for Pain. Max 3 a day 2/18/14  Yes Marquis Blake MD   LIDODERM 5 % APPLY ONE PATCH TO SKIN 12 HOURS ON AND 12 HOURS OFF 11/16/13  Yes Marquis Blake MD   zolpidem (AMBIEN CR) 12.5 MG CR tablet Take 1 tablet by mouth nightly as needed for Sleep. Patient taking differently: Take 10 mg by mouth nightly as needed for Sleep. 10/28/13  Yes Marquis Blake MD   atorvastatin (LIPITOR) 40 MG tablet Take 1 tablet by mouth nightly 10/16/18   MARGUERITE Livingston CNP        Allergies:  Penicillins     Review of Systems:   · Constitutional: there has been no unanticipated weight loss. · Eyes: No vision changes  · ENT: No Headaches, no nasal congestion. No mouth sores or sore throat. · Cardiovascular: Reviewed in HPI  · Respiratory: No cough or wheezing, no sputum production. · Gastrointestinal: No abdominal pain, no constipation or diarrhea  · Genitourinary: No dysuria, trouble voiding, or hematuria. · Musculoskeletal:  No weakness or joint complaints. · Integumentary: No rash or pruritis. · Neurological: No numbness or tingling. No weakness. No tremor. · Psychiatric: No anxiety, no depression. · Endocrine:  No excessive thirst or urination.   · Hematologic/Lymphatic: No pectoris    3. Pure hypercholesterolemia    4. Essential hypertension          Plan:   1. Agree with change from Lipitor to Crestor  2. Agree with vascular screening  3. No change in heart medicines  4. Referral to cardiac rehab phase II at [facility] location  5. Follow up with Dr. Anatoly Downey in 3 months      I appreciate the opportunity of cooperating in the care of this individual.    Karo Gillis CNP, 10/22/2018, 10:07 AM    QUALITY MEASURES  1. Tobacco Cessation Counseling: Yes  2. Retake of BP if >140/90:   NA  3. Documentation to PCP/referring for new patient:  Sent to PCP at close of office visit  4. CAD patient on anti-platelet: Yes  5. CAD patient on STATIN therapy:  Yes  6.  Patient with CHF and aFib on anticoagulation:  NA

## 2018-11-09 ENCOUNTER — TELEPHONE (OUTPATIENT)
Dept: CARDIOLOGY | Age: 48
End: 2018-11-09

## 2018-11-09 DIAGNOSIS — R07.9 CHEST PAIN IN ADULT: Primary | ICD-10-CM

## 2019-01-09 ENCOUNTER — HOSPITAL ENCOUNTER (OUTPATIENT)
Dept: CARDIOLOGY | Age: 49
Discharge: HOME | End: 2019-01-09
Payer: COMMERCIAL

## 2019-01-09 DIAGNOSIS — R07.9 CHEST PAIN IN ADULT: ICD-10-CM

## 2019-01-09 LAB
LV EF: 57 %
LVEF MODALITY: NORMAL

## 2019-01-09 PROCEDURE — 3430000000 HC RX DIAGNOSTIC RADIOPHARMACEUTICAL: Performed by: INTERNAL MEDICINE

## 2019-01-09 PROCEDURE — 93017 CV STRESS TEST TRACING ONLY: CPT

## 2019-01-09 PROCEDURE — 78452 HT MUSCLE IMAGE SPECT MULT: CPT

## 2019-01-09 PROCEDURE — A9502 TC99M TETROFOSMIN: HCPCS | Performed by: INTERNAL MEDICINE

## 2019-01-09 RX ADMIN — TETROFOSMIN 31.5 MILLICURIE: 0.23 INJECTION, POWDER, LYOPHILIZED, FOR SOLUTION INTRAVENOUS at 11:21

## 2019-01-09 RX ADMIN — TETROFOSMIN 10.8 MILLICURIE: 0.23 INJECTION, POWDER, LYOPHILIZED, FOR SOLUTION INTRAVENOUS at 09:32

## 2019-01-10 ENCOUNTER — TELEPHONE (OUTPATIENT)
Dept: CARDIOLOGY CLINIC | Age: 49
End: 2019-01-10

## 2019-01-16 ENCOUNTER — TELEPHONE (OUTPATIENT)
Dept: CARDIOLOGY CLINIC | Age: 49
End: 2019-01-16

## 2019-01-28 ENCOUNTER — TELEPHONE (OUTPATIENT)
Dept: CARDIOLOGY CLINIC | Age: 49
End: 2019-01-28

## 2019-05-13 ENCOUNTER — HOSPITAL ENCOUNTER (OUTPATIENT)
Age: 49
Setting detail: OBSERVATION
Discharge: HOME | End: 2019-05-14
Attending: EMERGENCY MEDICINE | Admitting: INTERNAL MEDICINE
Payer: COMMERCIAL

## 2019-05-13 ENCOUNTER — APPOINTMENT (OUTPATIENT)
Dept: GENERAL RADIOLOGY | Age: 49
End: 2019-05-13
Payer: COMMERCIAL

## 2019-05-13 DIAGNOSIS — R07.9 CHEST PAIN, UNSPECIFIED TYPE: Primary | ICD-10-CM

## 2019-05-13 LAB
A/G RATIO: 1.5 (ref 1.1–2.2)
ALBUMIN SERPL-MCNC: 4.5 G/DL (ref 3.4–5)
ALP BLD-CCNC: 82 U/L (ref 40–129)
ALT SERPL-CCNC: 15 U/L (ref 10–40)
ANION GAP SERPL CALCULATED.3IONS-SCNC: 10 MMOL/L (ref 3–16)
AST SERPL-CCNC: 16 U/L (ref 15–37)
BASOPHILS ABSOLUTE: 0.1 K/UL (ref 0–0.2)
BASOPHILS RELATIVE PERCENT: 2 %
BILIRUB SERPL-MCNC: 0.4 MG/DL (ref 0–1)
BUN BLDV-MCNC: 12 MG/DL (ref 7–20)
CALCIUM SERPL-MCNC: 9.7 MG/DL (ref 8.3–10.6)
CHLORIDE BLD-SCNC: 104 MMOL/L (ref 99–110)
CO2: 26 MMOL/L (ref 21–32)
CREAT SERPL-MCNC: 1 MG/DL (ref 0.9–1.3)
EOSINOPHILS ABSOLUTE: 0.4 K/UL (ref 0–0.6)
EOSINOPHILS RELATIVE PERCENT: 6.3 %
GFR AFRICAN AMERICAN: >60
GFR NON-AFRICAN AMERICAN: >60
GLOBULIN: 3 G/DL
GLUCOSE BLD-MCNC: 99 MG/DL (ref 70–99)
HCT VFR BLD CALC: 41.8 % (ref 40.5–52.5)
HEMOGLOBIN: 14.1 G/DL (ref 13.5–17.5)
LYMPHOCYTES ABSOLUTE: 2.8 K/UL (ref 1–5.1)
LYMPHOCYTES RELATIVE PERCENT: 40.4 %
MCH RBC QN AUTO: 29.6 PG (ref 26–34)
MCHC RBC AUTO-ENTMCNC: 33.7 G/DL (ref 31–36)
MCV RBC AUTO: 87.8 FL (ref 80–100)
MONOCYTES ABSOLUTE: 0.6 K/UL (ref 0–1.3)
MONOCYTES RELATIVE PERCENT: 8.6 %
NEUTROPHILS ABSOLUTE: 3 K/UL (ref 1.7–7.7)
NEUTROPHILS RELATIVE PERCENT: 42.7 %
PDW BLD-RTO: 12.7 % (ref 12.4–15.4)
PLATELET # BLD: 152 K/UL (ref 135–450)
PMV BLD AUTO: 8.9 FL (ref 5–10.5)
POTASSIUM SERPL-SCNC: 4.4 MMOL/L (ref 3.5–5.1)
RBC # BLD: 4.76 M/UL (ref 4.2–5.9)
SODIUM BLD-SCNC: 140 MMOL/L (ref 136–145)
TOTAL PROTEIN: 7.5 G/DL (ref 6.4–8.2)
TROPONIN: <0.01 NG/ML
WBC # BLD: 7 K/UL (ref 4–11)

## 2019-05-13 PROCEDURE — G0378 HOSPITAL OBSERVATION PER HR: HCPCS

## 2019-05-13 PROCEDURE — 80053 COMPREHEN METABOLIC PANEL: CPT

## 2019-05-13 PROCEDURE — 84484 ASSAY OF TROPONIN QUANT: CPT

## 2019-05-13 PROCEDURE — 85025 COMPLETE CBC W/AUTO DIFF WBC: CPT

## 2019-05-13 PROCEDURE — 2580000003 HC RX 258: Performed by: INTERNAL MEDICINE

## 2019-05-13 PROCEDURE — 6370000000 HC RX 637 (ALT 250 FOR IP): Performed by: EMERGENCY MEDICINE

## 2019-05-13 PROCEDURE — 6370000000 HC RX 637 (ALT 250 FOR IP): Performed by: INTERNAL MEDICINE

## 2019-05-13 PROCEDURE — 93005 ELECTROCARDIOGRAM TRACING: CPT | Performed by: EMERGENCY MEDICINE

## 2019-05-13 PROCEDURE — 36415 COLL VENOUS BLD VENIPUNCTURE: CPT

## 2019-05-13 PROCEDURE — 71046 X-RAY EXAM CHEST 2 VIEWS: CPT

## 2019-05-13 PROCEDURE — 99285 EMERGENCY DEPT VISIT HI MDM: CPT

## 2019-05-13 RX ORDER — MORPHINE SULFATE 2 MG/ML
2 INJECTION, SOLUTION INTRAMUSCULAR; INTRAVENOUS EVERY 4 HOURS PRN
Status: DISCONTINUED | OUTPATIENT
Start: 2019-05-13 | End: 2019-05-14 | Stop reason: HOSPADM

## 2019-05-13 RX ORDER — SODIUM CHLORIDE 0.9 % (FLUSH) 0.9 %
10 SYRINGE (ML) INJECTION EVERY 12 HOURS SCHEDULED
Status: DISCONTINUED | OUTPATIENT
Start: 2019-05-13 | End: 2019-05-14 | Stop reason: HOSPADM

## 2019-05-13 RX ORDER — SODIUM CHLORIDE 0.9 % (FLUSH) 0.9 %
10 SYRINGE (ML) INJECTION PRN
Status: DISCONTINUED | OUTPATIENT
Start: 2019-05-13 | End: 2019-05-14 | Stop reason: HOSPADM

## 2019-05-13 RX ORDER — ONDANSETRON 2 MG/ML
4 INJECTION INTRAMUSCULAR; INTRAVENOUS EVERY 6 HOURS PRN
Status: DISCONTINUED | OUTPATIENT
Start: 2019-05-13 | End: 2019-05-14 | Stop reason: HOSPADM

## 2019-05-13 RX ORDER — MORPHINE SULFATE 4 MG/ML
4 INJECTION, SOLUTION INTRAMUSCULAR; INTRAVENOUS EVERY 4 HOURS PRN
Status: DISCONTINUED | OUTPATIENT
Start: 2019-05-13 | End: 2019-05-14 | Stop reason: HOSPADM

## 2019-05-13 RX ORDER — ROSUVASTATIN CALCIUM 10 MG/1
40 TABLET, COATED ORAL EVERY EVENING
Status: DISCONTINUED | OUTPATIENT
Start: 2019-05-13 | End: 2019-05-14 | Stop reason: HOSPADM

## 2019-05-13 RX ORDER — CLOPIDOGREL BISULFATE 75 MG/1
75 TABLET ORAL DAILY
Status: DISCONTINUED | OUTPATIENT
Start: 2019-05-14 | End: 2019-05-14 | Stop reason: HOSPADM

## 2019-05-13 RX ORDER — ZOLPIDEM TARTRATE 5 MG/1
10 TABLET ORAL NIGHTLY PRN
Status: DISCONTINUED | OUTPATIENT
Start: 2019-05-14 | End: 2019-05-14

## 2019-05-13 RX ORDER — ALPRAZOLAM 1 MG/1
1 TABLET ORAL NIGHTLY PRN
Status: DISCONTINUED | OUTPATIENT
Start: 2019-05-13 | End: 2019-05-13

## 2019-05-13 RX ORDER — ASPIRIN 325 MG
325 TABLET ORAL ONCE
Status: COMPLETED | OUTPATIENT
Start: 2019-05-13 | End: 2019-05-13

## 2019-05-13 RX ORDER — ASPIRIN 81 MG/1
81 TABLET, CHEWABLE ORAL DAILY
Status: DISCONTINUED | OUTPATIENT
Start: 2019-05-14 | End: 2019-05-14 | Stop reason: HOSPADM

## 2019-05-13 RX ADMIN — Medication 10 ML: at 21:56

## 2019-05-13 RX ADMIN — ASPIRIN 325 MG: 325 TABLET ORAL at 18:32

## 2019-05-13 RX ADMIN — NITROGLYCERIN 0.5 INCH: 20 OINTMENT TOPICAL at 21:49

## 2019-05-13 RX ADMIN — ROSUVASTATIN CALCIUM 40 MG: 10 TABLET, FILM COATED ORAL at 21:43

## 2019-05-13 ASSESSMENT — PAIN DESCRIPTION - DESCRIPTORS: DESCRIPTORS: BURNING

## 2019-05-13 ASSESSMENT — ENCOUNTER SYMPTOMS
TROUBLE SWALLOWING: 0
NAUSEA: 0
ABDOMINAL PAIN: 0
SORE THROAT: 0
VOMITING: 0
DIARRHEA: 0
CONSTIPATION: 0
CHEST TIGHTNESS: 0
COUGH: 0
RHINORRHEA: 0
SHORTNESS OF BREATH: 0
BACK PAIN: 0

## 2019-05-13 ASSESSMENT — PAIN SCALES - GENERAL
PAINLEVEL_OUTOF10: 8
PAINLEVEL_OUTOF10: 4
PAINLEVEL_OUTOF10: 0

## 2019-05-13 ASSESSMENT — PAIN DESCRIPTION - LOCATION
LOCATION: CHEST
LOCATION: CHEST

## 2019-05-13 ASSESSMENT — HEART SCORE: ECG: 0

## 2019-05-13 NOTE — H&P
Hospital Medicine History & Physical      PCP: Argenis Gongora MD    Date of Admission: 5/13/2019    Date of Service: Pt seen/examined on 5/13/2019 and placed in Observation. Chief Complaint:  Chest pain    History Of Present Illness:   54 y.o. male who presented to [facility] with chest pain. PMHx significant for CAD status post stent, hypertension, hyperlipidemia. He presents with acute onset of chest pain the afternoon of presentation. He describes the pain as heaviness like something sitting on his chest.  Pain is mostly left-sided although initially had some radiation to the left jaw. He denies any dizziness, nausea, vomiting, diaphoresis. Chest pain was moderate to severe. He did have some improvement with nitroglycerin after the second tablet, although he continues to report some mild to moderate chest pain at the time of admission. Past Medical History:          Diagnosis Date    Chest pain 10/13/2018    + Troponins    Chronic pain syndrome     Current smoker     DDD (degenerative disc disease), lumbar     Facet joint syndrome (HCC)     High cholesterol     Hypertension     (does not take meds per wife as instructed)     IgA nephropathy     Insomnia     Neuropathic pain     NSTEMI (non-ST elevated myocardial infarction) ([facility] 10/13/2018    + Troponins, PTCA    Pain management contract agreement     with Dr. Moira Victor       Past Surgical History:          Procedure Laterality Date    DENTAL SURGERY      now dentures upper and lower    PTCA  10/15/2018    BMS- 2.25 x 18 to Diag 1       Medications Prior to Admission:      Prior to Admission medications    Medication Sig Start Date End Date Taking?  Authorizing Provider   rosuvastatin (CRESTOR) 40 MG tablet Take 40 mg by mouth every evening   Yes Historical Provider, MD   aspirin 81 MG chewable tablet Take 1 tablet by mouth daily 10/17/18  Yes Cecilia Willson APRN - CNP   nitroGLYCERIN (NITROSTAT) 0.4 MG SL tablet up to max of 3 total doses. If no relief after 1 dose, call 911. 10/16/18  Yes MARGUERITE Bose CNP   clopidogrel (PLAVIX) 75 MG tablet Take 1 tablet by mouth daily 10/17/18  Yes MARGUERITE Sanchez - NEREIDA   ALPRAZolam Abhinav Seek) 1 MG tablet Take 1 mg by mouth nightly as needed for Sleep. Yes Historical Provider, MD   oxyCODONE-acetaminophen (PERCOCET) 7.5-325 MG per tablet Take 1 tablet by mouth every 6 hours as needed for Pain. Max 3 a day 2/18/14  Yes Daysi Burks MD   zolpidem (AMBIEN CR) 12.5 MG CR tablet Take 1 tablet by mouth nightly as needed for Sleep. Patient taking differently: Take 10 mg by mouth nightly as needed for Sleep. 10/28/13  Yes Daysi Burks MD       Allergies:  Penicillins    Social History:    TOBACCO:   reports that he quit smoking about 6 months ago. His smoking use included cigarettes. He has a 10.00 pack-year smoking history. He has never used smokeless tobacco.  ETOH:   reports that he does not drink alcohol. Family History:          Problem Relation Age of Onset    High Blood Pressure Mother     High Blood Pressure Daughter        REVIEW OF SYSTEMS:   Pertinent positives as noted in the HPI. All other systems reviewed and negative. Physical Exam Performed:    /78   Pulse 54   Resp 18   Ht 5' 8\" (1.727 m)   Wt 160 lb 3.2 oz (72.7 kg)   SpO2 98%   BMI 24.36 kg/m²     General appearance: No apparent distress, appears stated age and cooperative. HEENT:  Normal cephalic, atraumatic without obvious deformity. Pupils equal, round, and reactive to light. Extra ocular muscles intact. Conjunctivae/corneas clear. Neck: Supple, no jugular venous distention. Trachea midline with full range of motion. Respiratory:  Normal respiratory effort. Clear to auscultation, bilaterally without Rales/Wheezes/Rhonchi. Cardiovascular: Regular rate and rhythm with normal S1/S2 without murmurs, rubs or gallops.   Abdomen: Soft, non-tender, non-distended with normal bowel sounds. Musculoskelatal: No clubbing, cyanosis or edema bilaterally. Full range of motion without deformity. Neurologic:  Neurovascularly intact without any focal sensory/motor deficits. Cranial nerves: II-XII intact, grossly non-focal.  Psychiatric: Alert and oriented, thought content appropriate, normal insight  Skin: Skin color, texture, turgor normal.  No rashes or lesions. Capillary Refill: Brisk,< 3 seconds   Peripheral Pulses: +2 palpable, equal bilaterally       Labs:     Recent Labs     05/13/19  1640   WBC 7.0   HGB 14.1   HCT 41.8        Recent Labs     05/13/19  1640      K 4.4      CO2 26   BUN 12   CREATININE 1.0   CALCIUM 9.7     Recent Labs     05/13/19  1640   AST 16   ALT 15   BILITOT 0.4   ALKPHOS 82     No results for input(s): INR in the last 72 hours. Recent Labs     05/13/19  1640   TROPONINI <0.01       Radiology:     CXR: I have reviewed the CXR with the following interpretation: Clear  EKG:  I have reviewed the EKG with the following interpretation: Sinus bradycardia, no acute ischemic changes. Xr Chest Standard (2 Vw)    Result Date: 5/13/2019  EXAMINATION: TWO XRAY VIEWS OF THE CHEST 5/13/2019 4:41 pm COMPARISON: Prior study(s) most recent 10/13/2018. HISTORY: ORDERING SYSTEM PROVIDED HISTORY: chest pain TECHNOLOGIST PROVIDED HISTORY: Reason for exam:->chest pain Ordering Physician Provided Reason for Exam: cp; hx of mi Acuity: Acute Type of Exam: Initial FINDINGS: The heart, lungs and pulmonary vessels are normal.     Normal chest.       ASSESSMENT:    Active Problems:    Chest pain  Resolved Problems:    * No resolved hospital problems. *      PLAN:    CAD/Chest Pain: He was previously treated for an NSTEMI in 10/2018 with PCI of the diagonal 1. Hits of additional disease that was treated medically. He has been followed by Dr. Landen Leon. He had a follow-up stress test which had low risk findings and no further PCI was recommended.   He now presents with recurrent episode of chest pain which she reports is similar to his prior episode. Initial EKG and troponin are negative although he continues to have some chest pain. Recommend placement in observation for serial troponins. Low threshold for starting anticoagulation if any change in troponins or worsening symptoms. Cardiology consultation. Continue DAPT, statin, beta blocker. DVT Prophylaxis: Lovenox  Diet: General  Code Status: FUll    PT/OT Eval Status: NA    Dispo - 1-2 days       Sebastian Stephen MD    Thank you Margy Baeza MD for the opportunity to be involved in this patient's care. If you have any questions or concerns please feel free to contact me at [phone].

## 2019-05-13 NOTE — ED PROVIDER NOTES
[facility]  ED  eMERGENCYdEPARTMENT eNCOUnter      Pt Name: Erin Claros  MRN: [id number]  [place] 1970  Date of evaluation: 5/13/2019  Rosangela Pedraza MD    CHIEF COMPLAINT       Chief Complaint   Patient presents with    Chest Pain     heart attack in october and cardiologist is Dr Michael Reeder. Pt started with chest pain around 1430. Took 2 nitro, they did not help         HISTORY OF PRESENT ILLNESS   (Location/Symptom, Timing/Onset,Context/Setting, Quality, Duration, Modifying Factors, Severity)  Note limiting factors. Erin Claros is a 54 y.o. male history of CAD, hypertension who presents to the emergency department for chest pain. Reports he was driving earlier today at 2:30 when he started to feel left-sided sharp pressure-like chest pain as well as associated left jaw numbness. Denies nausea, shortness of breath, diaphoresis, dizziness. HPI    Nursing Notes were reviewed. REVIEW OF SYSTEMS    (2-9 systems for level 4, 10 or more for level 5)     Review of Systems   Constitutional: Negative for activity change, appetite change, chills, diaphoresis and fever. HENT: Negative for congestion, rhinorrhea, sneezing, sore throat and trouble swallowing. Respiratory: Negative for cough, chest tightness and shortness of breath. Cardiovascular: Positive for chest pain. Negative for palpitations. Gastrointestinal: Negative for abdominal pain, constipation, diarrhea, nausea and vomiting. Genitourinary: Negative for dysuria, flank pain and hematuria. Musculoskeletal: Negative for back pain, gait problem and myalgias. Skin: Negative for rash and wound. Neurological: Negative for dizziness, weakness and numbness. Except as noted above the remainder of the review of systems was reviewedand negative.        PAST MEDICAL HISTORY     Past Medical History:   Diagnosis Date    Chest pain 10/13/2018    + Troponins    Chronic pain syndrome     Current smoker     DDD (degenerative disc disease), lumbar     Facet joint syndrome (HCC)     High cholesterol     Hypertension     (does not take meds per wife as instructed)     IgA nephropathy     Insomnia     Neuropathic pain     [facility] 10/13/2018    + Troponins, PTCA    Pain management contract agreement     with Dr. Samra Christopher       Past Surgical History:   Procedure Laterality Date    DENTAL SURGERY      now dentures upper and lower    PTCA  10/15/2018    BMS- 2.25 x 18 to Diag 1         CURRENT MEDICATIONS       Previous Medications    ALPRAZOLAM (XANAX) 1 MG TABLET    Take 1 mg by mouth nightly as needed for Sleep. ASPIRIN 81 MG CHEWABLE TABLET    Take 1 tablet by mouth daily    CLOPIDOGREL (PLAVIX) 75 MG TABLET    Take 1 tablet by mouth daily    NITROGLYCERIN (NITROSTAT) 0.4 MG SL TABLET    up to max of 3 total doses. If no relief after 1 dose, call 911. OXYCODONE-ACETAMINOPHEN (PERCOCET) 7.5-325 MG PER TABLET    Take 1 tablet by mouth every 6 hours as needed for Pain. Max 3 a day    ROSUVASTATIN (CRESTOR) 40 MG TABLET    Take 40 mg by mouth every evening    ZOLPIDEM (AMBIEN CR) 12.5 MG CR TABLET    Take 1 tablet by mouth nightly as needed for Sleep.        ALLERGIES     Penicillins    FAMILY HISTORY       Family History   Problem Relation Age of Onset    High Blood Pressure Mother     High Blood Pressure Daughter           SOCIAL HISTORY       Social History     Socioeconomic History    Marital status:      Spouse name: None    Number of children: None    Years of education: None    Highest education level: None   Occupational History    None   Social Needs    Financial resource strain: None    Food insecurity:     Worry: None     Inability: None    Transportation needs:     Medical: None     Non-medical: None   Tobacco Use    Smoking status: Former Smoker     Packs/day: 1.00     Years: 10.00     Pack years: 10.00 Types: Cigarettes     Last attempt to quit: 10/13/2018     Years since quittin.5    Smokeless tobacco: Never Used   Substance and Sexual Activity    Alcohol use: No    Drug use: No    Sexual activity: Yes   Lifestyle    Physical activity:     Days per week: None     Minutes per session: None    Stress: None   Relationships    Social connections:     Talks on phone: None     Gets together: None     Attends Cheondoism service: None     Active member of club or organization: None     Attends meetings of clubs or organizations: None     Relationship status: None    Intimate partner violence:     Fear of current or ex partner: None     Emotionally abused: None     Physically abused: None     Forced sexual activity: None   Other Topics Concern    None   Social History Narrative    None       SCREENINGS      Heart Score for chest pain patients  History: Slightly Suspicious  ECG: Normal  Patient Age: > 39 and < 65 years  *Risk factors for Atherosclerotic disease: Cigarette smoking, Diabetes Mellitus, Hypercholesterolemia, Hypertension  Risk Factors: > 3 Risk factors or history of atherosclerotic disease*  Troponin: < 1X normal limit  Heart Score Total: 3      PHYSICAL EXAM    (up to 7 for level 4, 8 ormore for level 5)     ED Triage Vitals [19 1630]   BP Temp Temp src Pulse Resp SpO2 Height Weight   121/87 -- -- 66 18 97 % 5' 8\" (1.727 m) 160 lb 3.2 oz (72.7 kg)       Physical Exam    DIAGNOSTIC RESULTS     EKG: All EKG's are interpreted by the Emergency Department Physicianwho either signs or Co-signs this chart in the absence of a cardiologist.    The Ekg interpreted by me shows  sinus bradycardia, rate=50   Axis is   Normal  QTc is  393  Intervals and Durations are unremarkable.       ST Segments: normal  No significant change from prior EKG dated 10/17/2018    RADIOLOGY:   Non-plain film images such as CT, Ultrasound and MRI are read by the radiologist. Plain radiographic images are visualized and preliminarily interpreted by the emergency physician with the below findings:      Interpretation per the Radiologist below, if available at the time of this note:    XR CHEST STANDARD (2 VW)   Final Result   Normal chest.               ED BEDSIDE ULTRASOUND:   Performed by ED Physician - none    LABS:  Labs Reviewed   CBC WITH AUTO DIFFERENTIAL    Narrative:     Performed at:  [address], [facility] [employer]   Phone [phone]   COMPREHENSIVE METABOLIC PANEL    Narrative:     Performed at:  [address], [facility] [employer]   Phone [phone]   TROPONIN    Narrative:     Performed at:  [address], [facility] [employer]   Phone [phone]       All other labs were within normal range ornot returned as of this dictation. EMERGENCY DEPARTMENT COURSE and DIFFERENTIAL DIAGNOSIS/MDM:   Vitals:    Vitals:    05/13/19 1630   BP: 121/87   Pulse: 66   Resp: 18   SpO2: 97%   Weight: 160 lb 3.2 oz (72.7 kg)   Height: 5' 8\" (1.727 m)         MDM    ED COURSE/MDM    -Florina Hernandez is a 54 y.o. male with a history of CAD (PCI 10/2019) CDU for chest pain that started this afternoon. Patient reports that he took 2 nitroglycerin tablets without improvement of pain.  -Patient seen and evaluated. Oldrecords reviewed. Labs and imaging reviewed and results discussed with patient. Workup was significant for wnl  -Chart review patient within an STEMI with left heart cath and PCI   -Chest x-ray shows normal chest  -stress test (1/2019): Small to moderate area of inferior ischemia  -Patient was given aspirin, nitroglycerin paste in the ED   -And patient's risk factors including remaining blockages, patient warrants admission for further workup and treatment discussed with patient and family.  Patient and family in agreement with plan and have nofurther

## 2019-05-13 NOTE — ED NOTES
@ [phone] - Paged hospitalists via perfect serve. @ [phone] - Paged hospitalists via perfect serve again.   @[phone] - Dr Biggs Resides called back to speak with Dr Duong Mclean  05/13/19 2179

## 2019-05-13 NOTE — LETTER
[id number] [facility] [address]  Phone: [phone]             May 14, 2019    Patient: Tylor Lance   YOB: 1970   Date of Visit: 5/13/2019       To Whom It May Concern:    Natalia Causey was seen and treated in our facility beginning 5/13/2019 until 5/14/2019. Wife has been here with the patient since admission.      Sincerely,       Gregory Knowles RN         Signature:__________________________________

## 2019-05-14 VITALS
OXYGEN SATURATION: 99 % | RESPIRATION RATE: 16 BRPM | DIASTOLIC BLOOD PRESSURE: 74 MMHG | TEMPERATURE: 97.6 F | BODY MASS INDEX: 23.36 KG/M2 | HEART RATE: 51 BPM | HEIGHT: 68 IN | WEIGHT: 154.1 LBS | SYSTOLIC BLOOD PRESSURE: 114 MMHG

## 2019-05-14 LAB
CHOLESTEROL, TOTAL: 100 MG/DL (ref 0–199)
EKG ATRIAL RATE: 50 BPM
EKG DIAGNOSIS: NORMAL
EKG P AXIS: 55 DEGREES
EKG P-R INTERVAL: 118 MS
EKG Q-T INTERVAL: 432 MS
EKG QRS DURATION: 82 MS
EKG QTC CALCULATION (BAZETT): 393 MS
EKG R AXIS: 35 DEGREES
EKG T AXIS: 44 DEGREES
EKG VENTRICULAR RATE: 50 BPM
HCT VFR BLD CALC: 38.5 % (ref 40.5–52.5)
HDLC SERPL-MCNC: 38 MG/DL (ref 40–60)
HEMOGLOBIN: 13.4 G/DL (ref 13.5–17.5)
LDL CHOLESTEROL CALCULATED: 43 MG/DL
MCH RBC QN AUTO: 30.1 PG (ref 26–34)
MCHC RBC AUTO-ENTMCNC: 34.8 G/DL (ref 31–36)
MCV RBC AUTO: 86.5 FL (ref 80–100)
PDW BLD-RTO: 12.5 % (ref 12.4–15.4)
PLATELET # BLD: 130 K/UL (ref 135–450)
PMV BLD AUTO: 9.1 FL (ref 5–10.5)
RBC # BLD: 4.45 M/UL (ref 4.2–5.9)
TRIGL SERPL-MCNC: 94 MG/DL (ref 0–150)
VLDLC SERPL CALC-MCNC: 19 MG/DL
WBC # BLD: 5.3 K/UL (ref 4–11)

## 2019-05-14 PROCEDURE — G0378 HOSPITAL OBSERVATION PER HR: HCPCS

## 2019-05-14 PROCEDURE — 6360000002 HC RX W HCPCS: Performed by: INTERNAL MEDICINE

## 2019-05-14 PROCEDURE — 99254 IP/OBS CNSLTJ NEW/EST MOD 60: CPT | Performed by: INTERNAL MEDICINE

## 2019-05-14 PROCEDURE — 85027 COMPLETE CBC AUTOMATED: CPT

## 2019-05-14 PROCEDURE — 93010 ELECTROCARDIOGRAM REPORT: CPT | Performed by: INTERNAL MEDICINE

## 2019-05-14 PROCEDURE — 36415 COLL VENOUS BLD VENIPUNCTURE: CPT

## 2019-05-14 PROCEDURE — 6370000000 HC RX 637 (ALT 250 FOR IP): Performed by: INTERNAL MEDICINE

## 2019-05-14 PROCEDURE — 96374 THER/PROPH/DIAG INJ IV PUSH: CPT

## 2019-05-14 PROCEDURE — 80061 LIPID PANEL: CPT

## 2019-05-14 PROCEDURE — 6370000000 HC RX 637 (ALT 250 FOR IP): Performed by: NURSE PRACTITIONER

## 2019-05-14 PROCEDURE — 96372 THER/PROPH/DIAG INJ SC/IM: CPT

## 2019-05-14 RX ORDER — NITROGLYCERIN 80 MG/1
1 PATCH TRANSDERMAL DAILY
Status: DISCONTINUED | OUTPATIENT
Start: 2019-05-14 | End: 2019-05-14 | Stop reason: HOSPADM

## 2019-05-14 RX ORDER — NITROGLYCERIN 80 MG/1
1 PATCH TRANSDERMAL DAILY
Qty: 30 PATCH | Refills: 0 | Status: ON HOLD | OUTPATIENT
Start: 2019-05-15 | End: 2019-08-20

## 2019-05-14 RX ORDER — ZOLPIDEM TARTRATE 5 MG/1
10 TABLET ORAL NIGHTLY PRN
Status: DISCONTINUED | OUTPATIENT
Start: 2019-05-14 | End: 2019-05-14 | Stop reason: HOSPADM

## 2019-05-14 RX ADMIN — CLOPIDOGREL BISULFATE 75 MG: 75 TABLET ORAL at 08:15

## 2019-05-14 RX ADMIN — ENOXAPARIN SODIUM 40 MG: 40 INJECTION SUBCUTANEOUS at 08:16

## 2019-05-14 RX ADMIN — ZOLPIDEM TARTRATE 10 MG: 5 TABLET ORAL at 00:42

## 2019-05-14 RX ADMIN — NITROGLYCERIN 0.5 INCH: 20 OINTMENT TOPICAL at 00:42

## 2019-05-14 RX ADMIN — ASPIRIN 81 MG 81 MG: 81 TABLET ORAL at 08:15

## 2019-05-14 RX ADMIN — MORPHINE SULFATE 2 MG: 2 INJECTION, SOLUTION INTRAMUSCULAR; INTRAVENOUS at 01:59

## 2019-05-14 ASSESSMENT — PAIN SCALES - GENERAL: PAINLEVEL_OUTOF10: 7

## 2019-05-14 ASSESSMENT — PAIN DESCRIPTION - LOCATION: LOCATION: CHEST

## 2019-05-14 ASSESSMENT — PAIN DESCRIPTION - DESCRIPTORS: DESCRIPTORS: DULL

## 2019-05-14 ASSESSMENT — PAIN DESCRIPTION - PROGRESSION: CLINICAL_PROGRESSION: GRADUALLY WORSENING

## 2019-05-14 ASSESSMENT — PAIN DESCRIPTION - PAIN TYPE: TYPE: ACUTE PAIN

## 2019-05-14 ASSESSMENT — PAIN DESCRIPTION - ORIENTATION: ORIENTATION: MID

## 2019-05-14 ASSESSMENT — PAIN DESCRIPTION - FREQUENCY: FREQUENCY: INTERMITTENT

## 2019-05-14 ASSESSMENT — PAIN DESCRIPTION - ONSET: ONSET: PROGRESSIVE

## 2019-05-14 ASSESSMENT — PAIN - FUNCTIONAL ASSESSMENT: PAIN_FUNCTIONAL_ASSESSMENT: ACTIVITIES ARE NOT PREVENTED

## 2019-05-14 NOTE — CONSULTS
[address] [facility]  [phone]      Attending Physician: Geremias Vital MD  Reason for Consultation/Chief Complaint: CP    Subjective   History of Present Illness:  Zuleika Torres is a 54 y.o. patient who presented to the hospital with complaints of CP. Started at 230pm yesterday while getting ready to get kids from school. Has been very busy with household and work and very active and arguing with wife. Cp was a dull pressure in chest, left side that did not radiate. Took 2 NTG and did not help but NTG patch in hosp helped. -N,V, had some jaw and lip numbness (just left). Whole event lasted few hours. Prior to that was active and running and no angina or CP. Same symptoms as back with last stent. Past Medical History:   has a past medical history of Chest pain, Chronic pain syndrome, Current smoker, DDD (degenerative disc disease), lumbar, Facet joint syndrome ([address] High cholesterol, Hypertension, IgA nephropathy, Insomnia, Neuropathic pain, NSTEMI (non-ST elevated myocardial infarction) ([address] and Pain management contract agreement. Surgical History:   has a past surgical history that includes Percutaneous Transluminal Coronary Angio (10/15/2018) and Dental surgery. Social History:   reports that he quit smoking about 7 months ago. His smoking use included cigarettes. He has a 10.00 pack-year smoking history. He has never used smokeless tobacco. He reports that he does not drink alcohol or use drugs. Family History:  family history includes High Blood Pressure in his daughter and mother. Home Medications:  Were reviewed and are listed in nursing record and/or below  Prior to Admission medications    Medication Sig Start Date End Date Taking?  Authorizing Provider   rosuvastatin (CRESTOR) 40 MG tablet Take 40 mg by mouth every evening   Yes Historical Provider, MD   aspirin 81 MG chewable tablet Take 1 tablet by mouth daily 10/17/18  Yes MARGUERITE Hernandez - CNP   nitroGLYCERIN (NITROSTAT) 0.4 MG SL tablet up to max of 3 total doses. If no relief after 1 dose, call 911. 10/16/18  Yes MARGUERITE Pierre CNP   clopidogrel (PLAVIX) 75 MG tablet Take 1 tablet by mouth daily 10/17/18  Yes MARGUERITE Meza - NEREIDA   ALPRAZolam Chacon Abed) 1 MG tablet Take 1 mg by mouth nightly as needed for Sleep. Yes Historical Provider, MD   oxyCODONE-acetaminophen (PERCOCET) 7.5-325 MG per tablet Take 1 tablet by mouth every 6 hours as needed for Pain. Max 3 a day 2/18/14  Yes Nataliia Trujillo MD   zolpidem (AMBIEN CR) 12.5 MG CR tablet Take 1 tablet by mouth nightly as needed for Sleep. Patient taking differently: Take 10 mg by mouth nightly as needed for Sleep. 10/28/13  Yes Nataliia Trujillo MD        CURRENT Medications:    zolpidem (AMBIEN) tablet 10 mg Nightly PRN   nitroglycerin (NITRO-BID) 2 % ointment 0.5 inch Once   clopidogrel (PLAVIX) tablet 75 mg Daily   rosuvastatin (CRESTOR) tablet 40 mg QPM   sodium chloride flush 0.9 % injection 10 mL 2 times per day   sodium chloride flush 0.9 % injection 10 mL PRN   magnesium hydroxide (MILK OF MAGNESIA) 400 MG/5ML suspension 30 mL Daily PRN   ondansetron (ZOFRAN) injection 4 mg Q6H PRN   aspirin chewable tablet 81 mg Daily   enoxaparin (LOVENOX) injection 40 mg Daily   morphine (PF) injection 2 mg Q4H PRN   Or    morphine injection 4 mg Q4H PRN   nitroglycerin (NITRO-BID) 2 % ointment 0.5 inch 4 times per day       Allergies:  Penicillins     Review of Systems:   A 14 point review of symptoms completed. Pertinent positives identified in the HPI, all other review of symptoms negative as below.       Objective   PHYSICAL EXAM:    Vitals:    05/14/19 0651   BP: 114/74   Pulse: 51   Resp: 16   Temp: 97.6 °F (36.4 °C)   SpO2: 99%    Weight: 154 lb 1.6 oz (69.9 kg)         General Appearance:  Alert, cooperative, no distress, appears stated age   Head:  Normocephalic, without obvious abnormality, atraumatic   Eyes:  PERRL, conjunctiva/corneas clear   Nose: Nares normal, no drainage or sinus tenderness   Throat: Lips, mucosa, and tongue normal   Neck: Supple, symmetrical, trachea midline, no adenopathy, thyroid: not enlarged, symmetric, no tenderness/mass/nodules, no carotid bruit or JVD   Lungs:   Clear to auscultation bilaterally, respirations unlabored   Chest Wall:  No deformity or tenderness   Heart:  Regular bradycardiac rate and rhythm, S1, S2 normal, no murmur, rub or gallop   Abdomen:   Soft, non-tender, bowel sounds active all four quadrants,  no masses, no organomegaly   Extremities: Extremities normal, atraumatic, no cyanosis or edema   Pulses: 2+ and symmetric   Skin: Skin color, texture, turgor normal, no rashes or lesions   Pysch: Normal mood and affect   Neurologic: Normal gross motor and sensory exam.         Labs   CBC: Lab Results   Component Value Date    WBC 5.3 2019    RBC 4.45 2019    HGB 13.4 2019    HCT 38.5 2019    MCV 86.5 2019    RDW 12.5 2019     2019     CMP:  Lab Results   Component Value Date     2019    K 4.4 2019    K 4.0 10/16/2018     2019    CO2 26 2019    BUN 12 2019    CREATININE 1.0 2019    GFRAA >60 2019    GFRAA >60 2013    AGRATIO 1.5 2019    LABGLOM >60 2019    GLUCOSE 99 2019    PROT 7.5 2019    PROT 7.7 05/10/2012    CALCIUM 9.7 2019    BILITOT 0.4 2019    ALKPHOS 82 2019    AST 16 2019    ALT 15 2019     PT/INR:  No results found for: PTINR  HgBA1c:No results found for: LABA1C  Lab Results   Component Value Date    TROPONINI <0.01 2019         Cardiac Data     Last EK2019 Sinus jameel at 50, No ischemia    Echo:    LV systolic function is normal with ejection fraction estimated at 55%.   Mild hypokinesis of the distal apical-septal segment on certain views.   Grade I diastolic dysfunction with normal left ventricular filling pressure.   Mild pulmonic regurgitation. Stress Test: 1/2019      Low normal LV function with normal wall motion    Small to moderate area of inferior ischemia (likely related to noted disease    in obtuse marginal in recent cath)    Excellent functional capacity    Overall this is a low to intermediate risk study     Cath: 10/2018  LM: distal 15%  LAD: mild disease in prox and mid up to 20%, distal 50%                Diag1- mid 95%  LCX: luminals                OM1- short ostial 70-80%  RCA: luminals, distal smooth 30-40%     LVEDP: 5  LVEF: 60%     PCI of Diag 1  STENT: [employer] bare metal 2.25 x 18mm   post dilated with 2.25mm        Assessment and Plan      1. Chest pain: ECG and trops negative for ischemia   - c/w unstable angina  2. CAD   - 10/2018 s/p PCI to Diag  3. HTN: well controlled  4. HLD: new labs pending    PLAN  1. Pt r/o for ACS but I do feel his CP is due to OM lesion. + low risk stress test in past but PCI deferred as pt angina free on meds at >4METS. - Pt now with CP and I feel repeat cath is needed and PCI to OM is likely warranted, given ostial side branch location, PCI is a little more challenging/risk but not at all prohibitive. diagnostic films are needed at minimum. 2. Pt states he is already seeking a 2nd opinion with [facility] heart and he specifically declines cardiac cath at this time. - Pt aware of risk  3. Will go ahead and start NTG patch. Would give only 1 wk worth to get pt thru to appt at [facility] for d/c home.     Patient Active Problem List   Diagnosis    IgA nephropathy    Anxiety    HTN (hypertension)    Contusion of toe    Tobacco use disorder    Lumbar disc disease    Arthritis of shoulder region, degenerative    DDD (degenerative disc disease), lumbosacral    Chronic pain syndrome    DDD (degenerative disc disease), lumbar    Facet joint syndrome ([address]    Neuropathic pain    Insomnia    Plantar fasciitis    [facility]non-ST [facility])    Chest pain    Coronary artery disease involving native coronary artery of native heart with unstable angina pectoris ([address]    Pure hypercholesterolemia           Thank you for allowing us to participate in the care of Vanessa Brought. Please call me with any questions [phone]. Kurt Su MD, [address] [facility] Cardiologist  [facility]  [phone] [facility]  [phone] [address]  5/14/2019 7:39 AM    I will address the patient's cardiac risk factors and adjusted pharmacologic treatment as needed. In addition, I have reinforced the need for patient directed risk factor modification. All questions and concerns were addressed to the patient/family. Alternatives to my treatment were discussed. The note was completed using EMR. Every effort was made to ensure accuracy; however, inadvertent computerized transcription errors may be present.

## 2019-05-14 NOTE — PROGRESS NOTES
Patient is awake, alert and oriented x4. Assessment is complete, see flow sheet. Bed in lowest position, wheels locked, call light is within reach. Patient denies any further needs at the moment. Will continue to monitor. Vitals:    05/13/19 2335   BP: 116/65   Pulse: 55   Resp: 16   Temp: 98.1 °F (36.7 °C)   SpO2: 99%     Pt wanting ambien, been taking for 12 years, cannot sleep without it. Arlin MARTÍNEZ paged for order, and to DC xanax. Ambien ordered, and given to pt.

## 2019-05-14 NOTE — PROGRESS NOTES
4 Eyes Skin Assessment     The patient is being assess for  Admission    I agree that 2 RN's have performed a thorough Head to Toe Skin Assessment on the patient. ALL assessment sites listed below have been assessed. Areas assessed by both nurses: yes  [x]   Head, Face, and Ears   [x]   Shoulders, Back, and Chest  [x]   Arms, Elbows, and Hands   [x]   Coccyx, Sacrum, and Ischum  [x]   Legs, Feet, and Heels        Does the Patient have Skin Breakdown?   No         Jorge Prevention initiated:  No   Wound Care Orders initiated:  No      [facility] nurse consulted for Pressure Injury (Stage 3,4, Unstageable, DTI, NWPT, and Complex wounds):  No      Nurse 1 eSignature: Electronically signed by Francis Arvizu RN on 5/14/19 at 12:56 AM    **SHARE this note so that the co-signing nurse is able to place an eSignature**    Nurse 2 eSignature: Electronically signed by Boone Pemberton RN on 5/14/19 at 6:10 AM

## 2019-05-14 NOTE — DISCHARGE INSTR - COC
Continuity of Care Form    Patient Name: Lashaun Diamond   :  1970  MRN:  [id number]    Admit date:  2019  Discharge date:  ***    Code Status Order: Full Code   Advance Directives:   Advance Care Flowsheet Documentation     Date/Time Healthcare Directive Type of Healthcare Directive Copy in [address] Agent's Name Healthcare Agent's Phone Number    [phone]  No, patient does not have an advance directive for healthcare treatment -- -- -- -- --          Admitting Physician:  Maggy Dickens MD  PCP: Lalito Andrade MD    Discharging Nurse: [facility] Unit/Room#: 0104/0104-01  Discharging Unit Phone Number: ***    Emergency Contact:   Extended Emergency Contact Information  Primary Emergency Contact: [facility], [facility]  Address: [place], [place], [address] [place] [address], [address] Phone: [phone]  Mobile Phone: [phone]  Relation: Spouse    Past Surgical History:  Past Surgical History:   Procedure Laterality Date    DENTAL SURGERY      now dentures upper and lower    PTCA  10/15/2018    BMS- 2.25 x 18 to Diag 1       Immunization History: There is no immunization history on file for this patient.     Active Problems:  Patient Active Problem List   Diagnosis Code    IgA nephropathy N02.8    Anxiety F41.9    HTN (hypertension) I10    Contusion of toe S90.129A    Tobacco use disorder F17.200    Lumbar disc disease M51.9    Arthritis of shoulder region, degenerative M19.019    DDD (degenerative disc disease), lumbosacral M51.37    Chronic pain syndrome G89.4    DDD (degenerative disc disease), lumbar M51.36    Facet joint syndrome ([address] M46.90    Neuropathic pain M79.2    Insomnia G47.00    Plantar fasciitis M72.2    NSTEMI (non-ST elevated myocardial infarction) ([address] I21.4    Chest pain R07.9    Coronary artery disease involving native coronary artery of native heart with unstable angina pectoris (HCC) I25.110    Pure hypercholesterolemia E78.00    Unstable angina (HCC) I20.0    Combined hyperlipidemia E78.2       Isolation/Infection:   Isolation          No Isolation            Nurse Assessment:  Last Vital Signs: /74   Pulse 51   Temp 97.6 °F (36.4 °C) (Oral)   Resp 16   Ht 5' 8\" (1.727 m)   Wt 154 lb 1.6 oz (69.9 kg)   SpO2 99%   BMI 23.43 kg/m²     Last documented pain score (0-10 scale): Pain Level: 7  Last Weight:   Wt Readings from Last 1 Encounters:   05/14/19 154 lb 1.6 oz (69.9 kg)     Mental Status:  {IP PT MENTAL STATUS:20030}    IV Access:  { FRACNA IV ACCESS:979828709}    Nursing Mobility/ADLs:  Walking   {CHP DME WVLF:108727741}  Transfer  {CHP DME ZZGN:193645832}  Bathing  {CHP DME QYQF:732899370}  Dressing  {CHP DME GWGF:364359181}  Toileting  {CHP DME EPZS:137689463}  Feeding  {CHP DME DTAV:120540097}  Med Admin  {P DME ZSYO:614599434}  Med Delivery   { FRANCA MED Delivery:843988848}    Wound Care Documentation and Therapy:        Elimination:  Continence:   · Bowel: {YES / MX:72171}  · Bladder: {YES / FO:83397}  Urinary Catheter: {Urinary Catheter:049422252}   Colostomy/Ileostomy/Ileal Conduit: {YES / YV:73390}       Date of Last BM: ***    Intake/Output Summary (Last 24 hours) at 5/14/2019 1046  Last data filed at 5/14/2019 0641  Gross per 24 hour   Intake 240 ml   Output 0 ml   Net 240 ml     I/O last 3 completed shifts:   In: 240 [P.O.:240]  Out: 0     Safety Concerns:     [address] Safety Concerns:735056023}    Impairments/Disabilities:      [address] Impairments/Disabilities:490619326}    Nutrition Therapy:  Current Nutrition Therapy:   [address] Diet List:556996166}    Routes of Feeding: {CHP DME Other Feedings:996514069}  Liquids: {Slp liquid thickness:87806}  Daily Fluid Restriction: {CHP DME Yes amt example:682698703}  Last Modified Barium Swallow with Video (Video Swallowing Test): {Done Not Done XXVC:194341198}    Treatments at the Time of Hospital Discharge:   Respiratory Treatments: ***  Oxygen Therapy:  {Therapy; copd oxygen:49450}  Ventilator:    {MH CC Vent WPR}    Rehab Therapies: {THERAPEUTIC INTERVENTION:0974242588}  Weight Bearing Status/Restrictions: 50Rigoberto GARCIA Weight Bearin}  Other Medical Equipment (for information only, NOT a DME order):  {EQUIPMENT:448414770}  Other Treatments: ***    Patient's personal belongings (please select all that are sent with patient):  {P DME Belongings:625989438}    RN SIGNATURE:  {Esignature:906216945}    CASE MANAGEMENT/SOCIAL WORK SECTION    Inpatient Status Date: ***    Readmission Risk Assessment Score:  Readmission Risk              Risk of Unplanned Readmission:        10           Discharging to Facility/ Agency   · Name:   · Address:  · Phone:  · Fax:    Dialysis Facility (if applicable)   · Name:  · Address:  · Dialysis Schedule:  · Phone:  · Fax:    / signature: {Esignature:315287317}    PHYSICIAN SECTION    Prognosis: {Prognosis:1742194763}    Condition at Discharge: 50Rigoberto Larson Patient Condition:536129036}    Rehab Potential (if transferring to Rehab): {Prognosis:2357859066}    Recommended Labs or Other Treatments After Discharge: ***    Physician Certification: I certify the above information and transfer of Bianka Pelayo  is necessary for the continuing treatment of the diagnosis listed and that he requires {Admit to Appropriate Level of Care:55527} for {GREATER/LESS:221719256} 30 days.      Update Admission H&P: {CHP DME Changes in NKIKI:953930415}    PHYSICIAN SIGNATURE:  {Esignature:632702998}

## 2019-05-14 NOTE — PROGRESS NOTES
Patient admitted to room 104 from ED. Patient oriented to room, call light, bed rails, phone, lights and bathroom. Patient instructed about the schedule of the day including: vital sign frequency, lab draws, possible tests, frequency of MD and staff rounds, including RN/MD rounding together at bedside, daily weights, and I &O's. Patient instructed about prescribed diet, how to use 8MENU, and television. Telemetry box 39 in place, patient aware of placement and reason. Bed locked, in lowest position, side rails up 2/4, call light within reach. Will continue to monitor.

## 2019-05-14 NOTE — PROGRESS NOTES
End of shift report given to GENEVIEVE, [address] bedside. Patient alert and oriented. Bed in lowest position with wheels locked. Call light within reach.  No further needs at this time. [facility]

## 2019-05-22 ENCOUNTER — TELEPHONE (OUTPATIENT)
Dept: CARDIOLOGY CLINIC | Age: 49
End: 2019-05-22

## 2019-05-22 NOTE — TELEPHONE ENCOUNTER
Dr. Rambo Olea contacted the office on 5-22-19 at [phone] and asked to speak with Destiny Mcdaniels. Informed physician that the Stephany Krabbe will be out for today and tomorrow, with a possible return on Friday (spoke with PM [address] [facility]). I offered to page any of the other internationalists but the physician specifically wants to speak with Destiny Mcdaniels regarding this patient upon his return. Please contact [address] at [phone].

## 2019-05-28 ENCOUNTER — TELEPHONE (OUTPATIENT)
Dept: CARDIOLOGY CLINIC | Age: 49
End: 2019-05-28

## 2019-05-28 NOTE — TELEPHONE ENCOUNTER
Spoke with patient. No meds need held. May take with sips of water morning of procedure. Voiced understanding.

## 2019-05-28 NOTE — TELEPHONE ENCOUNTER
Patient called in. Patient is scheduled with Dr. Babita Elliott for Left Heart Cath +/- PCI DIAG & OM on 6/4/19 at [facility], arrival time of 11:30am to the Cath Lab. Please have patient arrive to the main entrance of [facility] at 11:15am and check in with the registration desk. Please call patient regarding medication instructions. Remind patient to be NPO after midnight (8 hours prior). Do not apply lotions/creams on skin the day of procedure.

## 2019-06-02 NOTE — DISCHARGE SUMMARY
Hospital Medicine Discharge Summary    Patient: Sima Sampson     Age: 54 y.o. Gender: male  : 1970   MRN: [id number]  Code Status: Full     Admit Date: 2019   Discharge Date: 2019    Disposition:  Home     Condition at Discharge: Stable    Primary Care Provider: Margy Baeza MD    Admitting Physician: Sebastian Stephen MD  Discharge Physician: Sebastian Stephen MD       Discharge Diagnoses: Active Hospital Problems    Diagnosis    Unstable angina ([address] [I20.0]    Combined hyperlipidemia [E78.2]    Chest pain [R07.9]       Hospital Course:     54 y.o. male who presented to [facility] with chest pain. PMHx significant for CAD status post stent, hypertension, hyperlipidemia. He presents with acute onset of chest pain the afternoon of presentation. He describes the pain as heaviness like something sitting on his chest.  Pain is mostly left-sided although initially had some radiation to the left jaw. He denies any dizziness, nausea, vomiting, diaphoresis. Chest pain was moderate to severe. He did have some improvement with nitroglycerin after the second tablet, although he continues to report some mild to moderate chest pain at the time of admission. Assessment/Plan:    CAD/Chest Pain: He was previously treated for an NSTEMI in 10/2018 with PCI of the diagonal 1. Hits of additional disease that was treated medically. He has been followed by Dr. Jordy Ferro. He had a follow-up stress test which had low risk findings and no further PCI was recommended. He now presents with recurrent episode of chest pain which she reports is similar to his prior episode. EKG and troponin are negative. Cardiology consulted. Recommend consideration for diagnostic angiogram +/- PCI of suspected prior lesions.  Patient reports he is already scheduled to see another Cardiologist through  and wishes to proceed with second opinion prior to proceeding with Angiogram. Continue medical management of angina. Exam:   /74   Pulse 51   Temp 97.6 °F (36.4 °C) (Oral)   Resp 16   Ht 5' 8\" (1.727 m)   Wt 154 lb 1.6 oz (69.9 kg)   SpO2 99%   BMI 23.43 kg/m²   General appearance: No apparent distress, appears stated age and cooperative. HEENT:  Normal cephalic, atraumatic without obvious deformity. Pupils equal, round, and reactive to light. Extra ocular muscles intact. Conjunctivae/corneas clear. Neck: Supple, no jugular venous distention. Trachea midline with full range of motion. Respiratory:  Normal respiratory effort. Clear to auscultation, bilaterally without Rales/Wheezes/Rhonchi. Cardiovascular: Regular rate and rhythm with normal S1/S2 without murmurs, rubs or gallops. Abdomen: Soft, non-tender, non-distended with normal bowel sounds. Musculoskelatal: No clubbing, cyanosis or edema bilaterally. Full range of motion without deformity. Neurologic:  Neurovascularly intact without any focal sensory/motor deficits. Cranial nerves: II-XII intact, grossly non-focal.  Psychiatric: Alert and oriented, thought content appropriate, normal insight  Skin: Skin color, texture, turgor normal.  No rashes or lesions. Capillary Refill: Brisk,< 3 seconds   Peripheral Pulses: +2 palpable, equal bilaterally       Patient Discharge Instructions:    Follow-up with Cardiology as planned for second opinion    Discharge Medications:   Discharge Medication List as of 5/14/2019 10:52 AM      START taking these medications    Details   nitroGLYCERIN (NITRODUR) 0.4 MG/HR Place 1 patch onto the skin daily, Disp-30 patch, R-0Normal           Discharge Medication List as of 5/14/2019 10:52 AM        Discharge Medication List as of 5/14/2019 10:52 AM      CONTINUE these medications which have NOT CHANGED    Details   rosuvastatin (CRESTOR) 40 MG tablet Take 40 mg by mouth every eveningHistorical Med      aspirin 81 MG chewable tablet Take 1 tablet by mouth daily, Disp-30 tablet, R-11Normal nitroGLYCERIN (NITROSTAT) 0.4 MG SL tablet up to max of 3 total doses. If no relief after 1 dose, call 911., Disp-25 tablet, R-3Normal      clopidogrel (PLAVIX) 75 MG tablet Take 1 tablet by mouth daily, Disp-30 tablet, R-11Normal      ALPRAZolam (XANAX) 1 MG tablet Take 1 mg by mouth nightly as needed for Sleep. Historical Med      oxyCODONE-acetaminophen (PERCOCET) 7.5-325 MG per tablet Take 1 tablet by mouth every 6 hours as needed for Pain. Max 3 a day, Disp-30 tablet, R-0      zolpidem (AMBIEN CR) 12.5 MG CR tablet Take 1 tablet by mouth nightly as needed for Sleep., Disp-30 tablet, R-1           Discharge Medication List as of 5/14/2019 10:52 AM      STOP taking these medications       LIDODERM 5 % Comments:   Reason for Stopping:                 Significant Test Results    Xr Chest Standard (2 Vw)    Result Date: 5/13/2019  EXAMINATION: TWO XRAY VIEWS OF THE CHEST 5/13/2019 4:41 pm COMPARISON: Prior study(s) most recent 10/13/2018. HISTORY: ORDERING SYSTEM PROVIDED HISTORY: chest pain TECHNOLOGIST PROVIDED HISTORY: Reason for exam:->chest pain Ordering Physician Provided Reason for Exam: cp; hx of mi Acuity: Acute Type of Exam: Initial FINDINGS: The heart, lungs and pulmonary vessels are normal.     Normal chest.         Consults:     IP CONSULT TO HOSPITALIST  IP CONSULT TO CARDIOLOGY    Labs:  For convenience and continuity at follow-up the following most recent labs are provided:    Lab Results   Component Value Date    WBC 5.3 05/14/2019    HGB 13.4 05/14/2019    HCT 38.5 05/14/2019    MCV 86.5 05/14/2019     05/14/2019     05/13/2019    K 4.4 05/13/2019    K 4.0 10/16/2018     05/13/2019    CO2 26 05/13/2019    BUN 12 05/13/2019    CREATININE 1.0 05/13/2019    CALCIUM 9.7 05/13/2019    PHOS 3.1 10/13/2018    TROPONINI <0.01 05/13/2019    ALKPHOS 82 05/13/2019    ALT 15 05/13/2019    AST 16 05/13/2019    BILITOT 0.4 05/13/2019    BILIDIR 0.18 03/01/2011    LABALBU 4.5 05/13/2019 LDLCALC 43 05/14/2019    TRIG 94 05/14/2019     No results found for: INR      The patient was seen and examined on day of discharge and this discharge summary is in conjunction with any daily progress note from day of discharge. Time spent on discharge is more than 30 minutes in the examination, evaluation, counseling and review of medications and discharge plan. Signed:    Nimco Smallwood MD   6/2/2019    Thank you Chanel Zelaya MD for the opportunity to be involved in this patient's care. If you have any questions or concerns please feel free to contact my office [phone].

## 2019-06-04 ENCOUNTER — HOSPITAL ENCOUNTER (OUTPATIENT)
Dept: CARDIAC CATH/INVASIVE PROCEDURES | Age: 49
Discharge: HOME | End: 2019-06-05
Attending: INTERNAL MEDICINE | Admitting: INTERNAL MEDICINE
Payer: COMMERCIAL

## 2019-06-04 PROBLEM — Z98.61 S/P PTCA (PERCUTANEOUS TRANSLUMINAL CORONARY ANGIOPLASTY): Status: ACTIVE | Noted: 2019-06-04

## 2019-06-04 LAB
A/G RATIO: 1.5 (ref 1.1–2.2)
ALBUMIN SERPL-MCNC: 4.3 G/DL (ref 3.4–5)
ALP BLD-CCNC: 74 U/L (ref 40–129)
ALT SERPL-CCNC: 18 U/L (ref 10–40)
ANION GAP SERPL CALCULATED.3IONS-SCNC: 6 MMOL/L (ref 3–16)
AST SERPL-CCNC: 20 U/L (ref 15–37)
BILIRUB SERPL-MCNC: 0.4 MG/DL (ref 0–1)
BUN BLDV-MCNC: 19 MG/DL (ref 7–20)
CALCIUM SERPL-MCNC: 9.6 MG/DL (ref 8.3–10.6)
CHLORIDE BLD-SCNC: 104 MMOL/L (ref 99–110)
CO2: 28 MMOL/L (ref 21–32)
CREAT SERPL-MCNC: 1.1 MG/DL (ref 0.9–1.3)
EKG ATRIAL RATE: 45 BPM
EKG ATRIAL RATE: 48 BPM
EKG DIAGNOSIS: NORMAL
EKG DIAGNOSIS: NORMAL
EKG P AXIS: 55 DEGREES
EKG P AXIS: 75 DEGREES
EKG P-R INTERVAL: 156 MS
EKG P-R INTERVAL: 158 MS
EKG Q-T INTERVAL: 432 MS
EKG Q-T INTERVAL: 452 MS
EKG QRS DURATION: 82 MS
EKG QRS DURATION: 90 MS
EKG QTC CALCULATION (BAZETT): 385 MS
EKG QTC CALCULATION (BAZETT): 390 MS
EKG R AXIS: 16 DEGREES
EKG R AXIS: 71 DEGREES
EKG T AXIS: 25 DEGREES
EKG T AXIS: 58 DEGREES
EKG VENTRICULAR RATE: 45 BPM
EKG VENTRICULAR RATE: 48 BPM
GFR AFRICAN AMERICAN: >60
GFR NON-AFRICAN AMERICAN: >60
GLOBULIN: 2.9 G/DL
GLUCOSE BLD-MCNC: 105 MG/DL (ref 70–99)
HCT VFR BLD CALC: 39.3 % (ref 40.5–52.5)
HEMOGLOBIN: 13.6 G/DL (ref 13.5–17.5)
INR BLD: 0.98 (ref 0.86–1.14)
LEFT VENTRICULAR EJECTION FRACTION MODE: NORMAL
LV EF: 55 %
MCH RBC QN AUTO: 29.7 PG (ref 26–34)
MCHC RBC AUTO-ENTMCNC: 34.5 G/DL (ref 31–36)
MCV RBC AUTO: 86.3 FL (ref 80–100)
PDW BLD-RTO: 12.5 % (ref 12.4–15.4)
PLATELET # BLD: 151 K/UL (ref 135–450)
PMV BLD AUTO: 8.5 FL (ref 5–10.5)
POTASSIUM SERPL-SCNC: 5 MMOL/L (ref 3.5–5.1)
PROTHROMBIN TIME: 11.2 SEC (ref 9.8–13)
RBC # BLD: 4.56 M/UL (ref 4.2–5.9)
SODIUM BLD-SCNC: 138 MMOL/L (ref 136–145)
TOTAL PROTEIN: 7.2 G/DL (ref 6.4–8.2)
WBC # BLD: 6 K/UL (ref 4–11)

## 2019-06-04 PROCEDURE — C1769 GUIDE WIRE: HCPCS

## 2019-06-04 PROCEDURE — 93458 L HRT ARTERY/VENTRICLE ANGIO: CPT

## 2019-06-04 PROCEDURE — C1725 CATH, TRANSLUMIN NON-LASER: HCPCS

## 2019-06-04 PROCEDURE — 93571 IV DOP VEL&/PRESS C FLO 1ST: CPT

## 2019-06-04 PROCEDURE — 2709999900 HC NON-CHARGEABLE SUPPLY

## 2019-06-04 PROCEDURE — 99152 MOD SED SAME PHYS/QHP 5/>YRS: CPT

## 2019-06-04 PROCEDURE — 6370000000 HC RX 637 (ALT 250 FOR IP)

## 2019-06-04 PROCEDURE — 85027 COMPLETE CBC AUTOMATED: CPT

## 2019-06-04 PROCEDURE — 93571 IV DOP VEL&/PRESS C FLO 1ST: CPT | Performed by: INTERNAL MEDICINE

## 2019-06-04 PROCEDURE — 93010 ELECTROCARDIOGRAM REPORT: CPT | Performed by: INTERNAL MEDICINE

## 2019-06-04 PROCEDURE — 2720000010 HC SURG SUPPLY STERILE

## 2019-06-04 PROCEDURE — 6370000000 HC RX 637 (ALT 250 FOR IP): Performed by: INTERNAL MEDICINE

## 2019-06-04 PROCEDURE — 99152 MOD SED SAME PHYS/QHP 5/>YRS: CPT | Performed by: INTERNAL MEDICINE

## 2019-06-04 PROCEDURE — 93572 IV DOP VEL&/PRESS C FLO EA: CPT

## 2019-06-04 PROCEDURE — 92920 PRQ TRLUML C ANGIOP 1ART&/BR: CPT

## 2019-06-04 PROCEDURE — 93458 L HRT ARTERY/VENTRICLE ANGIO: CPT | Performed by: INTERNAL MEDICINE

## 2019-06-04 PROCEDURE — C1887 CATHETER, GUIDING: HCPCS

## 2019-06-04 PROCEDURE — 85347 COAGULATION TIME ACTIVATED: CPT

## 2019-06-04 PROCEDURE — 2580000003 HC RX 258

## 2019-06-04 PROCEDURE — 85610 PROTHROMBIN TIME: CPT

## 2019-06-04 PROCEDURE — 93572 IV DOP VEL&/PRESS C FLO EA: CPT | Performed by: INTERNAL MEDICINE

## 2019-06-04 PROCEDURE — 93005 ELECTROCARDIOGRAM TRACING: CPT | Performed by: INTERNAL MEDICINE

## 2019-06-04 PROCEDURE — 2500000003 HC RX 250 WO HCPCS

## 2019-06-04 PROCEDURE — 6360000002 HC RX W HCPCS

## 2019-06-04 PROCEDURE — C1760 CLOSURE DEV, VASC: HCPCS

## 2019-06-04 PROCEDURE — 6360000004 HC RX CONTRAST MEDICATION

## 2019-06-04 PROCEDURE — 80061 LIPID PANEL: CPT

## 2019-06-04 PROCEDURE — 2580000003 HC RX 258: Performed by: INTERNAL MEDICINE

## 2019-06-04 PROCEDURE — 80053 COMPREHEN METABOLIC PANEL: CPT

## 2019-06-04 PROCEDURE — 99153 MOD SED SAME PHYS/QHP EA: CPT

## 2019-06-04 PROCEDURE — C1894 INTRO/SHEATH, NON-LASER: HCPCS

## 2019-06-04 PROCEDURE — 92920 PRQ TRLUML C ANGIOP 1ART&/BR: CPT | Performed by: INTERNAL MEDICINE

## 2019-06-04 RX ORDER — ALPRAZOLAM 1 MG/1
1 TABLET ORAL NIGHTLY PRN
Status: DISCONTINUED | OUTPATIENT
Start: 2019-06-05 | End: 2019-06-05

## 2019-06-04 RX ORDER — HEPARIN SODIUM 1000 [USP'U]/ML
INJECTION, SOLUTION INTRAVENOUS; SUBCUTANEOUS
Status: COMPLETED | OUTPATIENT
Start: 2019-06-04 | End: 2019-06-04

## 2019-06-04 RX ORDER — ROSUVASTATIN CALCIUM 10 MG/1
40 TABLET, COATED ORAL EVERY EVENING
Status: DISCONTINUED | OUTPATIENT
Start: 2019-06-04 | End: 2019-06-05 | Stop reason: HOSPADM

## 2019-06-04 RX ORDER — OXYCODONE AND ACETAMINOPHEN 7.5; 325 MG/1; MG/1
1 TABLET ORAL EVERY 6 HOURS PRN
Status: DISCONTINUED | OUTPATIENT
Start: 2019-06-04 | End: 2019-06-05 | Stop reason: HOSPADM

## 2019-06-04 RX ORDER — SODIUM CHLORIDE 0.9 % (FLUSH) 0.9 %
10 SYRINGE (ML) INJECTION EVERY 12 HOURS SCHEDULED
Status: DISCONTINUED | OUTPATIENT
Start: 2019-06-04 | End: 2019-06-05 | Stop reason: HOSPADM

## 2019-06-04 RX ORDER — ZOLPIDEM TARTRATE 5 MG/1
10 TABLET ORAL NIGHTLY PRN
Status: DISCONTINUED | OUTPATIENT
Start: 2019-06-04 | End: 2019-06-05 | Stop reason: HOSPADM

## 2019-06-04 RX ORDER — CLOPIDOGREL BISULFATE 75 MG/1
75 TABLET ORAL ONCE
Status: COMPLETED | OUTPATIENT
Start: 2019-06-04 | End: 2019-06-04

## 2019-06-04 RX ORDER — ONDANSETRON 2 MG/ML
4 INJECTION INTRAMUSCULAR; INTRAVENOUS EVERY 6 HOURS PRN
Status: DISCONTINUED | OUTPATIENT
Start: 2019-06-04 | End: 2019-06-05 | Stop reason: HOSPADM

## 2019-06-04 RX ORDER — MIDAZOLAM HYDROCHLORIDE 5 MG/ML
INJECTION INTRAMUSCULAR; INTRAVENOUS
Status: COMPLETED | OUTPATIENT
Start: 2019-06-04 | End: 2019-06-04

## 2019-06-04 RX ORDER — CLOPIDOGREL 300 MG/1
TABLET, FILM COATED ORAL
Status: COMPLETED | OUTPATIENT
Start: 2019-06-04 | End: 2019-06-04

## 2019-06-04 RX ORDER — SODIUM CHLORIDE 0.9 % (FLUSH) 0.9 %
10 SYRINGE (ML) INJECTION PRN
Status: DISCONTINUED | OUTPATIENT
Start: 2019-06-04 | End: 2019-06-05 | Stop reason: HOSPADM

## 2019-06-04 RX ORDER — ASPIRIN 81 MG/1
81 TABLET, CHEWABLE ORAL DAILY
Status: DISCONTINUED | OUTPATIENT
Start: 2019-06-04 | End: 2019-06-05 | Stop reason: HOSPADM

## 2019-06-04 RX ORDER — FENTANYL CITRATE 50 UG/ML
INJECTION, SOLUTION INTRAMUSCULAR; INTRAVENOUS
Status: COMPLETED | OUTPATIENT
Start: 2019-06-04 | End: 2019-06-04

## 2019-06-04 RX ORDER — ASPIRIN 81 MG/1
81 TABLET, CHEWABLE ORAL ONCE
Status: COMPLETED | OUTPATIENT
Start: 2019-06-04 | End: 2019-06-04

## 2019-06-04 RX ORDER — ACETAMINOPHEN 325 MG/1
650 TABLET ORAL EVERY 4 HOURS PRN
Status: DISCONTINUED | OUTPATIENT
Start: 2019-06-04 | End: 2019-06-05 | Stop reason: HOSPADM

## 2019-06-04 RX ORDER — NITROGLYCERIN 0.4 MG/1
0.4 TABLET SUBLINGUAL EVERY 5 MIN PRN
Status: DISCONTINUED | OUTPATIENT
Start: 2019-06-04 | End: 2019-06-05 | Stop reason: HOSPADM

## 2019-06-04 RX ORDER — SODIUM CHLORIDE 9 MG/ML
INJECTION, SOLUTION INTRAVENOUS ONCE
Status: COMPLETED | OUTPATIENT
Start: 2019-06-04 | End: 2019-06-04

## 2019-06-04 RX ORDER — CLOPIDOGREL BISULFATE 75 MG/1
75 TABLET ORAL DAILY
Status: DISCONTINUED | OUTPATIENT
Start: 2019-06-05 | End: 2019-06-05 | Stop reason: HOSPADM

## 2019-06-04 RX ADMIN — ROSUVASTATIN CALCIUM 40 MG: 10 TABLET, FILM COATED ORAL at 18:11

## 2019-06-04 RX ADMIN — CLOPIDOGREL BISULFATE 75 MG: 75 TABLET ORAL at 12:01

## 2019-06-04 RX ADMIN — HEPARIN SODIUM 1000 UNITS: 1000 INJECTION, SOLUTION INTRAVENOUS; SUBCUTANEOUS at 14:32

## 2019-06-04 RX ADMIN — ZOLPIDEM TARTRATE 10 MG: 5 TABLET ORAL at 22:32

## 2019-06-04 RX ADMIN — OXYCODONE HYDROCHLORIDE AND ACETAMINOPHEN 1 TABLET: 7.5; 325 TABLET ORAL at 22:32

## 2019-06-04 RX ADMIN — HEPARIN SODIUM 3495 UNITS: 1000 INJECTION, SOLUTION INTRAVENOUS; SUBCUTANEOUS at 14:00

## 2019-06-04 RX ADMIN — OXYCODONE HYDROCHLORIDE AND ACETAMINOPHEN 1 TABLET: 7.5; 325 TABLET ORAL at 18:31

## 2019-06-04 RX ADMIN — MIDAZOLAM HYDROCHLORIDE 4 MG: 5 INJECTION INTRAMUSCULAR; INTRAVENOUS at 13:47

## 2019-06-04 RX ADMIN — ASPIRIN 81 MG 81 MG: 81 TABLET ORAL at 18:11

## 2019-06-04 RX ADMIN — SODIUM CHLORIDE: 9 INJECTION, SOLUTION INTRAVENOUS at 12:01

## 2019-06-04 RX ADMIN — CLOPIDOGREL 300 MG: 300 TABLET, FILM COATED ORAL at 14:37

## 2019-06-04 RX ADMIN — FENTANYL CITRATE 25 MCG: 50 INJECTION, SOLUTION INTRAMUSCULAR; INTRAVENOUS at 13:47

## 2019-06-04 RX ADMIN — ASPIRIN 81 MG: 81 TABLET, CHEWABLE ORAL at 12:01

## 2019-06-04 RX ADMIN — HEPARIN SODIUM 1500 UNITS: 1000 INJECTION, SOLUTION INTRAVENOUS; SUBCUTANEOUS at 14:15

## 2019-06-04 RX ADMIN — SODIUM CHLORIDE, PRESERVATIVE FREE 10 ML: 5 INJECTION INTRAVENOUS at 21:36

## 2019-06-04 ASSESSMENT — PAIN SCALES - GENERAL
PAINLEVEL_OUTOF10: 8
PAINLEVEL_OUTOF10: 9
PAINLEVEL_OUTOF10: 0
PAINLEVEL_OUTOF10: 7
PAINLEVEL_OUTOF10: 5

## 2019-06-04 NOTE — BRIEF OP NOTE
Brief Postoperative Note  ______________________________________________________________    Patient: Sonia Sheth  YOB: 1970  MRN: [id number]      Brief Postoperative Note  Pre-operative Diagnosis: unstable angina  Post-operative Diagnosis: Same  Procedure:   LHC  LVG  Cors  FFR of OM2  FFR of Diag1  Anesthesia: Moderate Sedation  Surgeons/Assistants: Heide Recio MD, [address] [facility] [place]  Estimated Blood Loss: less than 50   Complications: None  Specimens: Was Not Obtained    Findings:     LEFT HEART CATH  LM: distal 15%  LAD: mild disease in prox and mid 30-40%, previous distal lesion not seen                Diag1- prox 60% (FFR+) mid stent patent with mild restenosis of old stent  LCX: luminals              OM1- previous lesion significant improved 20% (leter in case about 60% c/w vasospasm)   OM2- prox 40%  RCA: dominant,  luminals     LVEDP: 5  LVEF: 60%    FFR of OM2: 0.97-->0.85  FFR of Diag1:  0.89-->0.71      Assessment  1. Compared to last cath of 10/2018 several lesions different and improved in size suggestive vasospasm  2. Negative FFR of previous LCX lesion. + vasospasm  3. Abnormal FFR of Diag trested successfully for POBA and cutting balloons.     - residual 10%   - if restenosis, will consider OCT, laser and KEITH PCI              Floresita Lowe MD  Date: 6/4/2019  Time: 2:39 PM

## 2019-06-04 NOTE — H&P
Hospital Medicine History & Physical      PCP: Montrell Santizo MD    Date of Admission: 6/4/2019    Date of Service: Pt seen/examined on 06/04/19 and placed in observation. Chief Complaint:  Chest pain      History Of Present Illness:      54 y.o. male who presented to [facility] with persistent chest pain. Patient does have previously known coronary artery disease with previous stents. He used to be a smoker. Stopped about 6 months ago. In past month, patient was admitted for chest pain. Cardiac stress test and patient's clinical history were suspicious. Cardiac catheterization was recommended, patient did not want to have the cardiac catheterization at that time and went to see his cardiologist for a second opinion. Came back today, had cardiac catheterization. Had PCI, and will be observed overnight on the floor. Hospitalist service was contacted for admission. At the time of this visit, patient was chest pain free and felt much better. Past Medical History:          Diagnosis Date    CAD (coronary artery disease) 10/13/2018    MI and + Troponins    Chest pain 10/13/2018    + Troponins    Chronic pain syndrome     Current smoker     DDD (degenerative disc disease), lumbar     Facet joint syndrome (HCC)     High cholesterol     Hypertension     (does not take meds per wife as instructed)     IgA nephropathy     Insomnia     Neuropathic pain     NSTEMI (non-ST elevated myocardial infarction) ([facility] 10/13/2018    + Troponins, PTCA    Pain management contract agreement     with Dr. Ellie Beckford       Past Surgical History:          Procedure Laterality Date    CORONARY ANGIOPLASTY  06/04/2019    POBA and cutting balloon to Diag 1   [address]      now dentures upper and lower    PTCA  10/15/2018    BMS- 2.25 x 18 to Diag 1       Medications Prior to Admission:      Prior to Admission medications    Medication Sig Start Date End Date Taking?  Authorizing Provider cephalic, atraumatic without obvious deformity. Pupils equal, round, and reactive to light. Extra ocular muscles intact. Conjunctivae/corneas clear. Neck: Supple, with full range of motion. No jugular venous distention. Trachea midline. Respiratory:  Normal respiratory effort. Clear to auscultation, bilaterally without Rales/Wheezes/Rhonchi. Cardiovascular:  Regular rate and rhythm with normal S1/S2 without murmurs, rubs or gallops. Abdomen: Soft, non-tender, non-distended with normal bowel sounds. Musculoskeletal:  No clubbing, cyanosis or edema bilaterally. Full range of motion without deformity. Right groin insertion site is mildly tender, no visible bleeding. Skin: Skin color, texture, turgor normal.  No rashes or lesions. Neurologic:  Neurovascularly intact without any focal sensory/motor deficits. Cranial nerves: II-XII intact, grossly non-focal.  Psychiatric:  Alert and oriented, thought content appropriate, normal insight  Capillary Refill: Brisk,< 3 seconds   Peripheral Pulses: +2 palpable, equal bilaterally       Labs:     Recent Labs     06/04/19  1203   WBC 6.0   HGB 13.6   HCT 39.3*        Recent Labs     06/04/19  1203      K 5.0      CO2 28   BUN 19   CREATININE 1.1   CALCIUM 9.6     Recent Labs     06/04/19  1203   AST 20   ALT 18   BILITOT 0.4   ALKPHOS 74     Recent Labs     06/04/19  1203   INR 0.98     No results for input(s): Fischer Broadwater in the last 72 hours. Urinalysis:      Lab Results   Component Value Date    BLOODU neg 03/01/2011    SPECGRAV 1.010 03/01/2011    GLUCOSEU neg 03/01/2011       Radiology:     CXR: I have reviewed the CXR with the following interpretation: Not done today  EKG:  I have reviewed the EKG with the following interpretation: Sinus bradycardia, no signs of ischemia.     No orders to display       ASSESSMENT:    Active Hospital Problems    Diagnosis Date Noted    S/P PTCA (percutaneous transluminal coronary angioplasty) [Z98.61] 06/04/2019    Unstable angina (HCC) [I20.0]          PLAN:    Coronary artery disease  Had cardiac cath and PCA. Stable, pain-free. Continue medical management post-procedure    Hypertension  Blood pressure is controlled. Continue home medications. Dyslipidemia   Continue statin at home dose. Chronic back pain  Follows with a pain specialist  Continue as needed pain medications. Discussed with Dr. Robin Enamorado    Discussed with the patient    DVT Prophylaxis: Lovenox starting tomorrow  Diet: Regular diet  Code Status: Full code    PT/OT Eval Status: Not needed. Patient at baseline functionality    Dispo - observation overnight, discharge home tomorrow       Karen Chaparro MD    Thank you Ken Logan MD for the opportunity to be involved in this patient's care. If you have any questions or concerns please feel free to contact me at [phone].

## 2019-06-04 NOTE — PROGRESS NOTES
Patient Former Smoker (Quit Date: 10/13/2018), tobacco cessation education is not indicated at this time.

## 2019-06-04 NOTE — PROGRESS NOTES
Gave Bedside report to: Yazan MARTÍNEZ    4 Eyes Skin Assessment     The patient is being assess for   Shift Handoff    I agree that 2 RN's have performed a thorough Head to Toe Skin Assessment on the patient. ALL assessment sites listed below have been assessed. Areas assessed by both nurses:   [x]   Head, Face, and Ears   [x]   Shoulders, Back, and Chest, Abdomen  [x]   Arms, Elbows, and Hands   [x]   Coccyx, Sacrum, and Ischium  [x]   Legs, Feet, and Heels        Does the Patient have Skin Breakdown?   No          Jorge Prevention initiated:  Yes   Wound Care Orders initiated:  No      WOC nurse consulted for Pressure Injury (Stage 3,4, Unstageable, DTI, NWPT, Complex wounds)and New or Established Ostomies:  NA      Primary Nurse eSignature: Electronically signed by Jessie Mariano RN on 6/4/19 at 7:59 PM    **SHARE this note so that the co-signing nurse is able to place an eSignature**    Co-signer eSignature: {Esignature:[id number]}

## 2019-06-05 VITALS
OXYGEN SATURATION: 99 % | HEART RATE: 68 BPM | RESPIRATION RATE: 20 BRPM | HEIGHT: 68 IN | SYSTOLIC BLOOD PRESSURE: 116 MMHG | WEIGHT: 158.1 LBS | DIASTOLIC BLOOD PRESSURE: 75 MMHG | BODY MASS INDEX: 23.96 KG/M2 | TEMPERATURE: 98.4 F

## 2019-06-05 LAB
A/G RATIO: 1.5 (ref 1.1–2.2)
ALBUMIN SERPL-MCNC: 3.9 G/DL (ref 3.4–5)
ALP BLD-CCNC: 67 U/L (ref 40–129)
ALT SERPL-CCNC: 16 U/L (ref 10–40)
ANION GAP SERPL CALCULATED.3IONS-SCNC: 7 MMOL/L (ref 3–16)
AST SERPL-CCNC: 18 U/L (ref 15–37)
BASOPHILS ABSOLUTE: 0.1 K/UL (ref 0–0.2)
BASOPHILS RELATIVE PERCENT: 1.2 %
BILIRUB SERPL-MCNC: 0.5 MG/DL (ref 0–1)
BUN BLDV-MCNC: 17 MG/DL (ref 7–20)
CALCIUM SERPL-MCNC: 9.1 MG/DL (ref 8.3–10.6)
CHLORIDE BLD-SCNC: 103 MMOL/L (ref 99–110)
CO2: 30 MMOL/L (ref 21–32)
CREAT SERPL-MCNC: 1.2 MG/DL (ref 0.9–1.3)
EOSINOPHILS ABSOLUTE: 0.4 K/UL (ref 0–0.6)
EOSINOPHILS RELATIVE PERCENT: 7.2 %
GFR AFRICAN AMERICAN: >60
GFR NON-AFRICAN AMERICAN: >60
GLOBULIN: 2.6 G/DL
GLUCOSE BLD-MCNC: 91 MG/DL (ref 70–99)
HCT VFR BLD CALC: 36.7 % (ref 40.5–52.5)
HEMOGLOBIN: 12.6 G/DL (ref 13.5–17.5)
LYMPHOCYTES ABSOLUTE: 1.9 K/UL (ref 1–5.1)
LYMPHOCYTES RELATIVE PERCENT: 30.2 %
MCH RBC QN AUTO: 29.8 PG (ref 26–34)
MCHC RBC AUTO-ENTMCNC: 34.4 G/DL (ref 31–36)
MCV RBC AUTO: 86.7 FL (ref 80–100)
MONOCYTES ABSOLUTE: 0.5 K/UL (ref 0–1.3)
MONOCYTES RELATIVE PERCENT: 7.6 %
NEUTROPHILS ABSOLUTE: 3.3 K/UL (ref 1.7–7.7)
NEUTROPHILS RELATIVE PERCENT: 53.8 %
PDW BLD-RTO: 12.4 % (ref 12.4–15.4)
PLATELET # BLD: 142 K/UL (ref 135–450)
PMV BLD AUTO: 8.2 FL (ref 5–10.5)
POTASSIUM REFLEX MAGNESIUM: 4.3 MMOL/L (ref 3.5–5.1)
RBC # BLD: 4.24 M/UL (ref 4.2–5.9)
SODIUM BLD-SCNC: 140 MMOL/L (ref 136–145)
TOTAL PROTEIN: 6.5 G/DL (ref 6.4–8.2)
WBC # BLD: 6.1 K/UL (ref 4–11)

## 2019-06-05 PROCEDURE — 80053 COMPREHEN METABOLIC PANEL: CPT

## 2019-06-05 PROCEDURE — 6370000000 HC RX 637 (ALT 250 FOR IP): Performed by: INTERNAL MEDICINE

## 2019-06-05 PROCEDURE — 99232 SBSQ HOSP IP/OBS MODERATE 35: CPT | Performed by: NURSE PRACTITIONER

## 2019-06-05 PROCEDURE — 85025 COMPLETE CBC W/AUTO DIFF WBC: CPT

## 2019-06-05 PROCEDURE — 36415 COLL VENOUS BLD VENIPUNCTURE: CPT

## 2019-06-05 PROCEDURE — 2580000003 HC RX 258: Performed by: INTERNAL MEDICINE

## 2019-06-05 RX ORDER — ALPRAZOLAM 1 MG/1
1 TABLET ORAL NIGHTLY PRN
Status: DISCONTINUED | OUTPATIENT
Start: 2019-06-05 | End: 2019-06-05 | Stop reason: HOSPADM

## 2019-06-05 RX ADMIN — SODIUM CHLORIDE, PRESERVATIVE FREE 10 ML: 5 INJECTION INTRAVENOUS at 10:07

## 2019-06-05 RX ADMIN — CLOPIDOGREL BISULFATE 75 MG: 75 TABLET, FILM COATED ORAL at 10:07

## 2019-06-05 RX ADMIN — ASPIRIN 81 MG 81 MG: 81 TABLET ORAL at 10:07

## 2019-06-05 ASSESSMENT — ENCOUNTER SYMPTOMS
RESPIRATORY NEGATIVE: 1
GASTROINTESTINAL NEGATIVE: 1

## 2019-06-05 ASSESSMENT — PAIN SCALES - GENERAL: PAINLEVEL_OUTOF10: 0

## 2019-06-05 NOTE — DISCHARGE INSTR - COC
Continuity of Care Form    Patient Name: Florina Hernandez   :  1970  MRN:  [id number]    Admit date:  2019  Discharge date:  ***    Code Status Order: Full Code   Advance Directives:   Advance Care Flowsheet Documentation     Date/Time Healthcare Directive Type of Healthcare Directive Copy in [address] Agent's Name Healthcare Agent's Phone Number    19 1856  -- -- -- -- -- --    19 155  No, patient does not have an advance directive for healthcare treatment -- -- -- -- --          Admitting Physician:  Lorena Rodriguez MD  PCP: Erasmo Prajapati MD    Discharging Nurse: [facility] Unit/Room#: 6381/8235-37  Discharging Unit Phone Number: ***    Emergency Contact:   Extended Emergency Contact Information  Primary Emergency Contact: [facility]  Address: [place], [place], [address] [place] [address], [address] [facility] Phone: [phone]  Mobile Phone: [phone]  Relation: Spouse    Past Surgical History:  Past Surgical History:   Procedure Laterality Date    CORONARY ANGIOPLASTY  2019    POBA and cutting balloon to Diag 1   [address]      now dentures upper and lower    PTCA  10/15/2018    BMS- 2.25 x 18 to Diag 1       Immunization History: There is no immunization history on file for this patient.     Active Problems:  Patient Active Problem List   Diagnosis Code    IgA nephropathy N02.8    Anxiety F41.9    HTN (hypertension) I10    Contusion of toe S90.129A    Tobacco use disorder F17.200    Lumbar disc disease M51.9    Arthritis of shoulder region, degenerative M19.019    DDD (degenerative disc disease), lumbosacral M51.37    Chronic pain syndrome G89.4    DDD (degenerative disc disease), lumbar M51.36    Facet joint syndrome ([facility] M46.90    Neuropathic pain M79.2    Insomnia G47.00    Plantar fasciitis M72.2    NSTEMI (non-ST elevated myocardial infarction) thickness:86167}  Daily Fluid Restriction: {CHP DME Yes amt example:717279370}  Last Modified Barium Swallow with Video (Video Swallowing Test): {Done Not Done EJLP:613561783}    Treatments at the Time of Hospital Discharge:   Respiratory Treatments: ***  Oxygen Therapy:  {Therapy; copd oxygen:79402}  Ventilator:    { CC Vent XJVN:299274616}    Rehab Therapies: {THERAPEUTIC INTERVENTION:1080893902}  Weight Bearing Status/Restrictions: { CC Weight Bearin}  Other Medical Equipment (for information only, NOT a DME order):  {EQUIPMENT:545659921}  Other Treatments: ***    Patient's personal belongings (please select all that are sent with patient):  {CHP DME Belongings:806050879}    RN SIGNATURE:  {Esignature:328132421}    CASE MANAGEMENT/SOCIAL WORK SECTION    Inpatient Status Date: ***    Readmission Risk Assessment Score:  Readmission Risk              Risk of Unplanned Readmission:        10           Discharging to Facility/ Agency   · Name:   · Address:  · Phone:  · Fax:    Dialysis Facility (if applicable)   · Name:  · Address:  · Dialysis Schedule:  · Phone:  · Fax:    / signature: {Esignature:247443978}    PHYSICIAN SECTION    Prognosis: {Prognosis:7360931767}    Condition at Discharge: [facility] Patient Condition:895075641}    Rehab Potential (if transferring to Rehab): {Prognosis:3929304253}    Recommended Labs or Other Treatments After Discharge: ***    Physician Certification: I certify the above information and transfer of Heath Pedroza  is necessary for the continuing treatment of the diagnosis listed and that he requires {Admit to Appropriate Level of Care:44430} for {GREATER/LESS:955526330} 30 days.      Update Admission H&P: {CHP DME Changes in SDWFH:028503602}    PHYSICIAN SIGNATURE:  {Esignature:352513713}

## 2019-06-05 NOTE — PROGRESS NOTES
[address]  Cardiology  Progress Note    Admission date:  2019    Reason for follow up visit: CAD post PCI    HPI/CC: Patti Kendrick is a 54 y.o. male presented as an outpatient 2019 for staged PCI of Diag +/- OM in the setting of unstable angina. FFR OM2 0.85, FFR Diag 1 0.71, treated with POBA and angiosculpt. No post procedure complications, rhythm overnight has been sinus bradycardia 40-50s. Subjective: Denies chest pain, shortness of breath, palpitations and dizziness. Wants to go home. Vitals:  Blood pressure 135/78, pulse 64, temperature 98.1 °F (36.7 °C), temperature source Oral, resp. rate 20, height 5' 8\" (1.727 m), weight 158 lb 1.6 oz (71.7 kg), SpO2 97 %.   Temp  Av.8 °F (36.6 °C)  Min: 97.4 °F (36.3 °C)  Max: 98.1 °F (36.7 °C)  Pulse  Av.1  Min: 47  Max: 78  BP  Min: 117/69  Max: 157/80  SpO2  Av.7 %  Min: 97 %  Max: 100 %    24 hour I/O    Intake/Output Summary (Last 24 hours) at 2019 0931  Last data filed at 2019 2330  Gross per 24 hour   Intake 604 ml   Output 800 ml   Net -196 ml     Current Facility-Administered Medications   Medication Dose Route Frequency Provider Last Rate Last Dose    ALPRAZolam (XANAX) tablet 1 mg  1 mg Oral Nightly PRN MARGUERITE Torrez - CNP        aspirin chewable tablet 81 mg  81 mg Oral Daily Walter Meraz MD   81 mg at 19    clopidogrel (PLAVIX) tablet 75 mg  75 mg Oral Daily Walter Meraz MD        nitroGLYCERIN (NITROSTAT) SL tablet 0.4 mg  0.4 mg Sublingual Q5 Min PRN Walter Meraz MD        rosuvastatin (CRESTOR) tablet 40 mg  40 mg Oral QPM Walter Meraz MD   40 mg at 19    sodium chloride flush 0.9 % injection 10 mL  10 mL Intravenous 2 times per day Rosa Ga MD   10 mL at 19 2136    sodium chloride flush 0.9 % injection 10 mL  10 mL Intravenous PRN Rosa Ga MD        magnesium hydroxide (MILK OF MAGNESIA) 400 MG/5ML suspension 30 mL  30 mL Oral Daily PRN Christoper Gilford, MD        ondansetron (ZOFRAN) injection 4 mg  4 mg Intravenous Q6H PRN Christoper Gilford, MD        enoxaparin (LOVENOX) injection 40 mg  40 mg Subcutaneous Daily Christoper Gilford, MD        acetaminophen (TYLENOL) tablet 650 mg  650 mg Oral Q4H PRN Christoper Gilford, MD        oxyCODONE-acetaminophen (PERCOCET) 7.5-325 MG per tablet 1 tablet  1 tablet Oral Q6H PRN Christoper Gilford, MD   1 tablet at 06/04/19 2232    zolpidem (AMBIEN) tablet 10 mg  10 mg Oral Nightly PRN Christoper Gilford, MD   10 mg at 06/04/19 2232     Review of Systems   Constitutional: Negative. Respiratory: Negative. Cardiovascular: Negative. Gastrointestinal: Negative. Neurological: Negative. Objective:     Telemetry monitor: SB 50s    Physical Exam:  Constitutional:  Comfortable and alert, NAD, appears stated age  Eyes: PERRL, sclera nonicteric  Neck:  Supple, no masses, no thyroidmegaly, no JVD  Skin:  Warm and dry; no rash or lesions  Heart: Regular, normal apex, S1 and S2 normal, no M/G/R  Lungs:  Normal respiratory effort; clear; no wheezing/rhonchi/rales  Abdomen: soft, non tender, + bowel sounds  Extremities:  No edema or cyanosis; no clubbing  Neuro: alert and oriented, moves legs and arms equally, normal mood and affect  Right femoral site soft, no hematoma, 2+ R femoral pulse, 2+ R DP/PT pulse    Data Reviewed:    Coronary angiogram 6/4/2019:  LM: distal 15%  LAD: mild disease in prox and mid 30-40%, previous distal lesion not seen                Diag1- prox 60% (FFR+) mid stent patent with mild restenosis of old stent  LCX: luminals              OM1- previous lesion significant improved 20% (leter in case about 60% c/w vasospasm)                OM2- prox 40%  RCA: dominant,  luminals  LVEDP: 5  LVEF: 60%  FFR of OM2: 0.97-->0.85  FFR of Diag1:  0.89-->0.71  1. Compared to last cath of 10/2018 several lesions different and improved in size suggestive vasospasm  2. Negative FFR of previous LCX lesion.  + vasospasm  3. Abnormal FFR of Diag trested successfully for POBA and cutting balloons. - residual 10%                - if restenosis, will consider OCT, laser and KEITH PCI    Stress test 1/9/2019:  Low normal LV function with normal wall motion    Small to moderate area of inferior ischemia (likely related to noted disease    in obtuse marginal in recent cath)    Excellent functional capacity    Overall this is a low to intermediate risk study     Echo 35/74/1595:  LV systolic function is normal with ejection fraction estimated at 55%.   Mild hypokinesis of the distal apical-septal segment on certain views.   Grade I diastolic dysfunction with normal left ventricular filling pressure.   Mild pulmonic regurgitation. Coronary angiogram 10/15/2018:  LM: distal 15%  LAD: mild disease in prox and mid up to 20%, distal 50%                Diag1- mid 95%  LCX: luminals                OM1- short ostial 70-80%  RCA: luminals, distal smooth 30-40%  LVEDP: 5  LVEF: 60%  PCI of Diag 1  STENT: Integritiy bare metal 2.25 x 18mm   post dilated with 2.25mm  1. Successful PCi to mid diagonal 1 culprit lesion using bare meta stent                - BMS used as pt needing month back injections and teeth extraction  2. For now, follow ostial OM1. R>B to PCI give proximity to large OM2,3  3. Follow ostial Diag 1- likely cutting balloon in future  4. Integrillin for 12 hrs  5. ASA 81mg poqday for life  6. Plavix 75mg po qday for 1 month w/o inturruption, ideally for 1 yr as possible  7.  statin as tolerated.  No BB due to bradycardia- watch as outpt to start     Lab Reviewed:     Renal Profile:  Lab Results   Component Value Date    CREATININE 1.2 06/05/2019    BUN 17 06/05/2019     06/05/2019    K 4.3 06/05/2019     06/05/2019    CO2 30 06/05/2019     CBC:    Lab Results   Component Value Date    WBC 6.1 06/05/2019    RBC 4.24 06/05/2019    HGB 12.6 06/05/2019    HCT 36.7 06/05/2019    MCV 86.7 06/05/2019

## 2019-06-05 NOTE — PROGRESS NOTES
Follow up visit by Cath Lab staff. Right femoral access site assessed and WNL. Post-cath restrictions reviewed with patient, patient verbalizes understanding. Patient with no further questions at this time.

## 2019-06-05 NOTE — DISCHARGE SUMMARY
Hospital Medicine Discharge Summary    Patient ID: Ketty Calixto      Patient's PCP: Li Macias MD    Admit Date: 6/4/2019     Discharge Date:   06/05/19     Admitting Physician: Mayda Mike MD     Discharge Physician: Delwyn Kehr, MD     Discharge Diagnoses: Active Hospital Problems    Diagnosis    S/P PTCA (percutaneous transluminal coronary angioplasty) [Z98.61]    Unstable angina (HCC) [I20.0]     Dyslipidemia  Hypertension     The patient was seen and examined on day of discharge and this discharge summary is in conjunction with any daily progress note from day of discharge. Hospital Course:   Admitted for overnight observation after cardiac catheterization and PTCA. Did well overnight. No problems with arterial access site. Did not have anymore chest pain or shortness of breath  On the day of discharge patient was stable. No new issues. Can be discharged home. Discussed with cardiology. Physical Exam Performed:     /75   Pulse 68   Temp 98.4 °F (36.9 °C) (Oral)   Resp 20   Ht 5' 8\" (1.727 m)   Wt 158 lb 1.6 oz (71.7 kg)   SpO2 99%   BMI 24.04 kg/m²       General appearance:  No apparent distress, appears stated age and cooperative. HEENT:  Normal cephalic, atraumatic without obvious deformity. Pupils equal, round, and reactive to light. Extra ocular muscles intact. Conjunctivae/corneas clear. Neck: Supple, with full range of motion. No jugular venous distention. Trachea midline. Respiratory:  Normal respiratory effort. Clear to auscultation, bilaterally without Rales/Wheezes/Rhonchi. Cardiovascular:  Regular rate and rhythm with normal S1/S2 without murmurs, rubs or gallops. Abdomen: Soft, non-tender, non-distended with normal bowel sounds. Musculoskeletal:  No clubbing, cyanosis or edema bilaterally. Full range of motion without deformity. Skin: Skin color, texture, turgor normal.  No rashes or lesions.   Neurologic:  Neurovascularly intact mouth nightly as needed for Sleep. Qty: 30 tablet, Refills: 1             Time Spent on discharge is more than 45 minutes in the examination, evaluation, counseling and review of medications and discharge plan. Signed:    Noel Zhu MD   6/5/2019      Thank you Sierra Price MD for the opportunity to be involved in this patient's care. If you have any questions or concerns please feel free to contact me at [phone].

## 2019-06-05 NOTE — CARE COORDINATION
Chart reviewed. Patient is a 54year old male with extensive cardiac history. He is insured through [facility] and [facility]. He is independent and from home where he lives with spouse. Cleared for discharge per cardiology notes. S/P heart cath with PCA. No needs for DCP. Home independently at discharge.

## 2019-06-05 NOTE — FLOWSHEET NOTE
Bedside report given by Tiffany Salgado, Skin assessment completed. Shift assessment completed and documented; see flowsheets for details. Pt resting in bed, VSS, Lines checked and verified. Repositioned patient, call light within reach, will continue to closely monitor.

## 2019-06-06 LAB
CHOLESTEROL, FASTING: 115 MG/DL (ref 0–199)
HDLC SERPL-MCNC: 44 MG/DL (ref 40–60)
LDL CHOLESTEROL CALCULATED: 57 MG/DL
TRIGLYCERIDE, FASTING: 71 MG/DL (ref 0–150)
VLDLC SERPL CALC-MCNC: 14 MG/DL

## 2019-06-07 ENCOUNTER — TELEPHONE (OUTPATIENT)
Dept: CARDIOLOGY | Age: 49
End: 2019-06-07

## 2019-06-07 ENCOUNTER — TELEPHONE (OUTPATIENT)
Dept: CARDIOLOGY CLINIC | Age: 49
End: 2019-06-07

## 2019-06-07 LAB
POC ACT LR: 242 SEC
POC ACT LR: 263 SEC
POC ACT LR: 269 SEC

## 2019-06-07 NOTE — TELEPHONE ENCOUNTER
Call received from PCP office. States patient came in with small bruising and lump at femoral cath site. PCP was not concerned. I spoke with Sirena Madison regarding this. Closure device was used at  time of cath. I spoke with patient, he also wanted you to know that he has some leg numbness from his ankle to his knee. He can walk on it and he is currently working outside.

## 2019-07-01 NOTE — PROGRESS NOTES
5 minutes; and if this doesn't relieve the pain, the patient is instructed to call 911 for transportation to an emergency department. It is a pleasure to assist in the care of Karen Perez. Please call with any questions. This note was scribed in the presence of Nathaniel Wallis MD by Bob Phan RN. The scribes documentation has been prepared under my direction and personally reviewed by me in its entirety. I confirm that the note above accurately reflects all work, treatment, procedures, and medical decision making performed by me. I, Dr. Brooke Watts MD, personally performed the services described in this documentation as scribed by Alex Limon   in my presence, and it is both accurate and complete to the best of our ability.            Brooke Watts MD, [address] [facility] Cardiologist  [facility]  [phone] [facility]  [phone] [address] [place] [address]

## 2019-07-02 ENCOUNTER — OFFICE VISIT (OUTPATIENT)
Dept: CARDIOLOGY CLINIC | Age: 49
End: 2019-07-02
Payer: COMMERCIAL

## 2019-07-02 VITALS
SYSTOLIC BLOOD PRESSURE: 100 MMHG | OXYGEN SATURATION: 98 % | DIASTOLIC BLOOD PRESSURE: 60 MMHG | HEART RATE: 67 BPM | WEIGHT: 163 LBS | BODY MASS INDEX: 24.71 KG/M2 | HEIGHT: 68 IN

## 2019-07-02 DIAGNOSIS — E78.2 MIXED HYPERLIPIDEMIA: ICD-10-CM

## 2019-07-02 DIAGNOSIS — I25.10 CORONARY ARTERY DISEASE INVOLVING NATIVE CORONARY ARTERY OF NATIVE HEART WITHOUT ANGINA PECTORIS: Primary | ICD-10-CM

## 2019-07-02 DIAGNOSIS — I10 ESSENTIAL HYPERTENSION: ICD-10-CM

## 2019-07-02 DIAGNOSIS — I20.1 CORONARY ARTERY VASOSPASM (HCC): ICD-10-CM

## 2019-07-02 PROCEDURE — G8598 ASA/ANTIPLAT THER USED: HCPCS | Performed by: INTERNAL MEDICINE

## 2019-07-02 PROCEDURE — G8420 CALC BMI NORM PARAMETERS: HCPCS | Performed by: INTERNAL MEDICINE

## 2019-07-02 PROCEDURE — 99213 OFFICE O/P EST LOW 20 MIN: CPT | Performed by: INTERNAL MEDICINE

## 2019-07-02 PROCEDURE — G8427 DOCREV CUR MEDS BY ELIG CLIN: HCPCS | Performed by: INTERNAL MEDICINE

## 2019-07-02 PROCEDURE — 1036F TOBACCO NON-USER: CPT | Performed by: INTERNAL MEDICINE

## 2019-07-02 NOTE — LETTER
lumbar, Facet joint syndrome, High cholesterol, Hypertension, IgA nephropathy, Insomnia, Neuropathic pain, NSTEMI (non-ST elevated myocardial infarction) ([facility] [address] and Pain management contract agreement. Surgical History:   has a past surgical history that includes Percutaneous Transluminal Coronary Angio (10/15/2018); Dental surgery; and Coronary angioplasty (06/04/2019). Social History:   reports that he quit smoking about 8 months ago. His smoking use included cigarettes. He has a 10.00 pack-year smoking history. He has never used smokeless tobacco. He reports that he does not drink alcohol or use drugs. Family History:  No evidence for sudden cardiac death or premature CAD    Home Medications:  Reviewed and are listed in nursing record. and/or listed below  Current Outpatient Medications   Medication Sig Dispense Refill    rosuvastatin (CRESTOR) 40 MG tablet Take 40 mg by mouth every evening      aspirin 81 MG chewable tablet Take 1 tablet by mouth daily 30 tablet 11    nitroGLYCERIN (NITROSTAT) 0.4 MG SL tablet up to max of 3 total doses. If no relief after 1 dose, call 911. 25 tablet 3    clopidogrel (PLAVIX) 75 MG tablet Take 1 tablet by mouth daily 30 tablet 11    ALPRAZolam (XANAX) 1 MG tablet Take 1 mg by mouth nightly as needed for Sleep.  oxyCODONE-acetaminophen (PERCOCET) 7.5-325 MG per tablet Take 1 tablet by mouth every 6 hours as needed for Pain. Max 3 a day 30 tablet 0    zolpidem (AMBIEN CR) 12.5 MG CR tablet Take 1 tablet by mouth nightly as needed for Sleep. (Patient taking differently: Take 10 mg by mouth nightly as needed for Sleep.) 30 tablet 1    nitroGLYCERIN (NITRODUR) 0.4 MG/HR Place 1 patch onto the skin daily 30 patch 0     No current facility-administered medications for this visit. Allergies:  Penicillins     Review of Systems:   A 14 point review of symptoms completed.  Pertinent positives identified in 5. The proper use and anticipated side effects of nitroglycerine has been carefully explained. If a single episode of chest pain is not relieved by one tablet, the patient will try another within 5 minutes; and if this doesn't relieve the pain, the patient is instructed to call 911 for transportation to an emergency department. It is a pleasure to assist in the care of Monique Vázquez. Please call with any questions. This note was scribed in the presence of Adelso Bynum MD by Cathy Corona RN. The scribes documentation has been prepared under my direction and personally reviewed by me in its entirety. I confirm that the note above accurately reflects all work, treatment, procedures, and medical decision making performed by me. I, Dr. Esther Gonzalez MD, personally performed the services described in this documentation as scribed by Juanita Woody   in my presence, and it is both accurate and complete to the best of our ability.            Esther Gonzalez MD, [address] [facility] Cardiologist  [address]  [phone] [facility]  [phone] [address] [place] [address]

## 2019-07-02 NOTE — PATIENT INSTRUCTIONS
Patient Plan:  1. No change in medications  2. No cardiac testing warranted at this time  3. Follow up with NP in 1 year   4. No limitations from cardiac standpoint   5. The proper use and anticipated side effects of nitroglycerine has been carefully explained. If a single episode of chest pain is not relieved by one tablet, the patient will try another within 5 minutes; and if this doesn't relieve the pain, the patient is instructed to call 911 for transportation to an emergency department.

## 2019-08-18 ENCOUNTER — APPOINTMENT (OUTPATIENT)
Dept: GENERAL RADIOLOGY | Age: 49
DRG: 871 | End: 2019-08-18
Payer: COMMERCIAL

## 2019-08-18 ENCOUNTER — HOSPITAL ENCOUNTER (INPATIENT)
Age: 49
LOS: 8 days | Discharge: HOME | DRG: 871 | End: 2019-08-26
Attending: EMERGENCY MEDICINE | Admitting: INTERNAL MEDICINE
Payer: COMMERCIAL

## 2019-08-18 DIAGNOSIS — R11.10 VOMITING AND DIARRHEA: ICD-10-CM

## 2019-08-18 DIAGNOSIS — R19.7 VOMITING AND DIARRHEA: ICD-10-CM

## 2019-08-18 DIAGNOSIS — R09.02 HYPOXIA: ICD-10-CM

## 2019-08-18 DIAGNOSIS — J18.9 PNEUMONIA DUE TO ORGANISM: Primary | ICD-10-CM

## 2019-08-18 LAB
A/G RATIO: 0.8 (ref 1.1–2.2)
ALBUMIN SERPL-MCNC: 3.5 G/DL (ref 3.4–5)
ALP BLD-CCNC: 135 U/L (ref 40–129)
ALT SERPL-CCNC: 46 U/L (ref 10–40)
ANION GAP SERPL CALCULATED.3IONS-SCNC: 11 MMOL/L (ref 3–16)
AST SERPL-CCNC: 63 U/L (ref 15–37)
BACTERIA: ABNORMAL /HPF
BASOPHILS ABSOLUTE: 0 K/UL (ref 0–0.2)
BASOPHILS RELATIVE PERCENT: 0.2 %
BILIRUB SERPL-MCNC: 0.8 MG/DL (ref 0–1)
BILIRUBIN URINE: ABNORMAL
BLOOD, URINE: NEGATIVE
BUN BLDV-MCNC: 16 MG/DL (ref 7–20)
CALCIUM SERPL-MCNC: 9.7 MG/DL (ref 8.3–10.6)
CHLORIDE BLD-SCNC: 94 MMOL/L (ref 99–110)
CLARITY: CLEAR
CO2: 30 MMOL/L (ref 21–32)
COLOR: YELLOW
CREAT SERPL-MCNC: 0.9 MG/DL (ref 0.9–1.3)
EOSINOPHILS ABSOLUTE: 0 K/UL (ref 0–0.6)
EOSINOPHILS RELATIVE PERCENT: 0 %
EPITHELIAL CELLS, UA: ABNORMAL /HPF
GFR AFRICAN AMERICAN: >60
GFR NON-AFRICAN AMERICAN: >60
GLOBULIN: 4.2 G/DL
GLUCOSE BLD-MCNC: 125 MG/DL (ref 70–99)
GLUCOSE URINE: NEGATIVE MG/DL
HCT VFR BLD CALC: 33.8 % (ref 40.5–52.5)
HEMOGLOBIN: 11.4 G/DL (ref 13.5–17.5)
KETONES, URINE: NEGATIVE MG/DL
LACTIC ACID, SEPSIS: 0.8 MMOL/L (ref 0.4–1.9)
LACTIC ACID: 1.2 MMOL/L (ref 0.4–2)
LEUKOCYTE ESTERASE, URINE: NEGATIVE
LIPASE: 7 U/L (ref 13–60)
LYMPHOCYTES ABSOLUTE: 0.3 K/UL (ref 1–5.1)
LYMPHOCYTES RELATIVE PERCENT: 2.2 %
MCH RBC QN AUTO: 29 PG (ref 26–34)
MCHC RBC AUTO-ENTMCNC: 33.8 G/DL (ref 31–36)
MCV RBC AUTO: 85.8 FL (ref 80–100)
MICROSCOPIC EXAMINATION: YES
MONOCYTES ABSOLUTE: 0.3 K/UL (ref 0–1.3)
MONOCYTES RELATIVE PERCENT: 2.1 %
NEUTROPHILS ABSOLUTE: 13.7 K/UL (ref 1.7–7.7)
NEUTROPHILS RELATIVE PERCENT: 95.5 %
NITRITE, URINE: NEGATIVE
PDW BLD-RTO: 13.1 % (ref 12.4–15.4)
PH UA: 6.5 (ref 5–8)
PLATELET # BLD: 213 K/UL (ref 135–450)
PMV BLD AUTO: 8.2 FL (ref 5–10.5)
POTASSIUM SERPL-SCNC: 4.3 MMOL/L (ref 3.5–5.1)
PROTEIN UA: 100 MG/DL
RBC # BLD: 3.94 M/UL (ref 4.2–5.9)
RBC UA: ABNORMAL /HPF (ref 0–2)
SODIUM BLD-SCNC: 135 MMOL/L (ref 136–145)
SPECIFIC GRAVITY UA: 1.01 (ref 1–1.03)
TOTAL CK: 92 U/L (ref 39–308)
TOTAL PROTEIN: 7.7 G/DL (ref 6.4–8.2)
TROPONIN: <0.01 NG/ML
URINE REFLEX TO CULTURE: ABNORMAL
URINE TYPE: ABNORMAL
UROBILINOGEN, URINE: 4 E.U./DL
WBC # BLD: 14.3 K/UL (ref 4–11)
WBC UA: ABNORMAL /HPF (ref 0–5)

## 2019-08-18 PROCEDURE — 81001 URINALYSIS AUTO W/SCOPE: CPT

## 2019-08-18 PROCEDURE — 96365 THER/PROPH/DIAG IV INF INIT: CPT

## 2019-08-18 PROCEDURE — 2580000003 HC RX 258: Performed by: EMERGENCY MEDICINE

## 2019-08-18 PROCEDURE — 36415 COLL VENOUS BLD VENIPUNCTURE: CPT

## 2019-08-18 PROCEDURE — 83690 ASSAY OF LIPASE: CPT

## 2019-08-18 PROCEDURE — 83605 ASSAY OF LACTIC ACID: CPT

## 2019-08-18 PROCEDURE — 93005 ELECTROCARDIOGRAM TRACING: CPT | Performed by: EMERGENCY MEDICINE

## 2019-08-18 PROCEDURE — 6360000002 HC RX W HCPCS: Performed by: EMERGENCY MEDICINE

## 2019-08-18 PROCEDURE — 87449 NOS EACH ORGANISM AG IA: CPT

## 2019-08-18 PROCEDURE — 71046 X-RAY EXAM CHEST 2 VIEWS: CPT

## 2019-08-18 PROCEDURE — 96375 TX/PRO/DX INJ NEW DRUG ADDON: CPT

## 2019-08-18 PROCEDURE — 2500000003 HC RX 250 WO HCPCS: Performed by: INTERNAL MEDICINE

## 2019-08-18 PROCEDURE — 99285 EMERGENCY DEPT VISIT HI MDM: CPT

## 2019-08-18 PROCEDURE — 6370000000 HC RX 637 (ALT 250 FOR IP): Performed by: INTERNAL MEDICINE

## 2019-08-18 PROCEDURE — 85025 COMPLETE CBC W/AUTO DIFF WBC: CPT

## 2019-08-18 PROCEDURE — 94761 N-INVAS EAR/PLS OXIMETRY MLT: CPT

## 2019-08-18 PROCEDURE — 2580000003 HC RX 258: Performed by: INTERNAL MEDICINE

## 2019-08-18 PROCEDURE — 2700000000 HC OXYGEN THERAPY PER DAY

## 2019-08-18 PROCEDURE — 1200000000 HC SEMI PRIVATE

## 2019-08-18 PROCEDURE — 6370000000 HC RX 637 (ALT 250 FOR IP): Performed by: EMERGENCY MEDICINE

## 2019-08-18 PROCEDURE — 82550 ASSAY OF CK (CPK): CPT

## 2019-08-18 PROCEDURE — 3E033XZ INTRODUCTION OF VASOPRESSOR INTO PERIPHERAL VEIN, PERCUTANEOUS APPROACH: ICD-10-PCS | Performed by: INTERNAL MEDICINE

## 2019-08-18 PROCEDURE — 80053 COMPREHEN METABOLIC PANEL: CPT

## 2019-08-18 PROCEDURE — 87040 BLOOD CULTURE FOR BACTERIA: CPT

## 2019-08-18 PROCEDURE — 84484 ASSAY OF TROPONIN QUANT: CPT

## 2019-08-18 RX ORDER — ONDANSETRON 2 MG/ML
4 INJECTION INTRAMUSCULAR; INTRAVENOUS ONCE
Status: COMPLETED | OUTPATIENT
Start: 2019-08-18 | End: 2019-08-18

## 2019-08-18 RX ORDER — ONDANSETRON 2 MG/ML
4 INJECTION INTRAMUSCULAR; INTRAVENOUS EVERY 6 HOURS PRN
Status: DISCONTINUED | OUTPATIENT
Start: 2019-08-18 | End: 2019-08-26 | Stop reason: HOSPADM

## 2019-08-18 RX ORDER — ASPIRIN 81 MG/1
81 TABLET, CHEWABLE ORAL DAILY
Status: DISCONTINUED | OUTPATIENT
Start: 2019-08-19 | End: 2019-08-18

## 2019-08-18 RX ORDER — OXYCODONE AND ACETAMINOPHEN 7.5; 325 MG/1; MG/1
1 TABLET ORAL EVERY 6 HOURS PRN
Status: DISCONTINUED | OUTPATIENT
Start: 2019-08-18 | End: 2019-08-26 | Stop reason: HOSPADM

## 2019-08-18 RX ORDER — CLOPIDOGREL BISULFATE 75 MG/1
75 TABLET ORAL NIGHTLY
Status: DISCONTINUED | OUTPATIENT
Start: 2019-08-18 | End: 2019-08-26 | Stop reason: HOSPADM

## 2019-08-18 RX ORDER — NITROGLYCERIN 80 MG/1
1 PATCH TRANSDERMAL DAILY
Status: DISCONTINUED | OUTPATIENT
Start: 2019-08-19 | End: 2019-08-20

## 2019-08-18 RX ORDER — SODIUM CHLORIDE 0.9 % (FLUSH) 0.9 %
10 SYRINGE (ML) INJECTION PRN
Status: DISCONTINUED | OUTPATIENT
Start: 2019-08-18 | End: 2019-08-26 | Stop reason: HOSPADM

## 2019-08-18 RX ORDER — ROSUVASTATIN CALCIUM 10 MG/1
40 TABLET, COATED ORAL EVERY EVENING
Status: DISCONTINUED | OUTPATIENT
Start: 2019-08-18 | End: 2019-08-20

## 2019-08-18 RX ORDER — ALPRAZOLAM 1 MG/1
1 TABLET ORAL NIGHTLY PRN
Status: DISCONTINUED | OUTPATIENT
Start: 2019-08-18 | End: 2019-08-20

## 2019-08-18 RX ORDER — CLOPIDOGREL BISULFATE 75 MG/1
75 TABLET ORAL DAILY
Status: DISCONTINUED | OUTPATIENT
Start: 2019-08-19 | End: 2019-08-18

## 2019-08-18 RX ORDER — ACETAMINOPHEN 325 MG/1
650 TABLET ORAL EVERY 4 HOURS PRN
Status: DISCONTINUED | OUTPATIENT
Start: 2019-08-18 | End: 2019-08-26 | Stop reason: HOSPADM

## 2019-08-18 RX ORDER — 0.9 % SODIUM CHLORIDE 0.9 %
2000 INTRAVENOUS SOLUTION INTRAVENOUS ONCE
Status: COMPLETED | OUTPATIENT
Start: 2019-08-18 | End: 2019-08-18

## 2019-08-18 RX ORDER — SODIUM CHLORIDE 9 MG/ML
INJECTION, SOLUTION INTRAVENOUS CONTINUOUS
Status: DISCONTINUED | OUTPATIENT
Start: 2019-08-18 | End: 2019-08-20

## 2019-08-18 RX ORDER — ACETAMINOPHEN 325 MG/1
650 TABLET ORAL ONCE
Status: COMPLETED | OUTPATIENT
Start: 2019-08-18 | End: 2019-08-18

## 2019-08-18 RX ORDER — ASPIRIN 81 MG/1
81 TABLET, CHEWABLE ORAL NIGHTLY
Status: DISCONTINUED | OUTPATIENT
Start: 2019-08-18 | End: 2019-08-26 | Stop reason: HOSPADM

## 2019-08-18 RX ORDER — SODIUM CHLORIDE 0.9 % (FLUSH) 0.9 %
10 SYRINGE (ML) INJECTION EVERY 12 HOURS SCHEDULED
Status: DISCONTINUED | OUTPATIENT
Start: 2019-08-18 | End: 2019-08-26 | Stop reason: HOSPADM

## 2019-08-18 RX ORDER — LEVOFLOXACIN 5 MG/ML
500 INJECTION, SOLUTION INTRAVENOUS ONCE
Status: COMPLETED | OUTPATIENT
Start: 2019-08-18 | End: 2019-08-18

## 2019-08-18 RX ORDER — KETOROLAC TROMETHAMINE 30 MG/ML
30 INJECTION, SOLUTION INTRAMUSCULAR; INTRAVENOUS ONCE
Status: COMPLETED | OUTPATIENT
Start: 2019-08-18 | End: 2019-08-18

## 2019-08-18 RX ADMIN — ACETAMINOPHEN 650 MG: 325 TABLET, FILM COATED ORAL at 21:10

## 2019-08-18 RX ADMIN — ALPRAZOLAM 1 MG: 1 TABLET ORAL at 23:30

## 2019-08-18 RX ADMIN — SODIUM CHLORIDE 2000 ML: 9 INJECTION, SOLUTION INTRAVENOUS at 21:09

## 2019-08-18 RX ADMIN — LEVOFLOXACIN 500 MG: 5 INJECTION, SOLUTION INTRAVENOUS at 21:17

## 2019-08-18 RX ADMIN — ONDANSETRON 4 MG: 2 INJECTION INTRAMUSCULAR; INTRAVENOUS at 21:10

## 2019-08-18 RX ADMIN — ASPIRIN 81 MG 81 MG: 81 TABLET ORAL at 23:30

## 2019-08-18 RX ADMIN — SODIUM CHLORIDE: 9 INJECTION, SOLUTION INTRAVENOUS at 23:30

## 2019-08-18 RX ADMIN — KETOROLAC TROMETHAMINE 30 MG: 30 INJECTION, SOLUTION INTRAMUSCULAR at 21:10

## 2019-08-18 RX ADMIN — CLOPIDOGREL BISULFATE 75 MG: 75 TABLET ORAL at 23:30

## 2019-08-18 RX ADMIN — DOXYCYCLINE 100 MG: 100 INJECTION, POWDER, LYOPHILIZED, FOR SOLUTION INTRAVENOUS at 23:30

## 2019-08-18 RX ADMIN — SODIUM CHLORIDE: 9 INJECTION, SOLUTION INTRAVENOUS at 23:32

## 2019-08-18 RX ADMIN — ROSUVASTATIN CALCIUM 40 MG: 10 TABLET, FILM COATED ORAL at 23:30

## 2019-08-18 ASSESSMENT — PAIN DESCRIPTION - LOCATION: LOCATION: ABDOMEN;HEAD

## 2019-08-18 ASSESSMENT — PAIN SCALES - GENERAL
PAINLEVEL_OUTOF10: 8
PAINLEVEL_OUTOF10: 4
PAINLEVEL_OUTOF10: 8
PAINLEVEL_OUTOF10: 8

## 2019-08-18 ASSESSMENT — PAIN DESCRIPTION - DESCRIPTORS: DESCRIPTORS: ACHING

## 2019-08-19 PROBLEM — R19.7 NAUSEA VOMITING AND DIARRHEA: Status: ACTIVE | Noted: 2019-08-19

## 2019-08-19 PROBLEM — R11.2 NAUSEA VOMITING AND DIARRHEA: Status: ACTIVE | Noted: 2019-08-19

## 2019-08-19 LAB
ANION GAP SERPL CALCULATED.3IONS-SCNC: 8 MMOL/L (ref 3–16)
BASOPHILS ABSOLUTE: 0 K/UL (ref 0–0.2)
BASOPHILS RELATIVE PERCENT: 0.2 %
BUN BLDV-MCNC: 18 MG/DL (ref 7–20)
CALCIUM SERPL-MCNC: 9.1 MG/DL (ref 8.3–10.6)
CHLORIDE BLD-SCNC: 103 MMOL/L (ref 99–110)
CO2: 29 MMOL/L (ref 21–32)
CREAT SERPL-MCNC: 1 MG/DL (ref 0.9–1.3)
EKG ATRIAL RATE: 93 BPM
EKG DIAGNOSIS: NORMAL
EKG P AXIS: 39 DEGREES
EKG P-R INTERVAL: 130 MS
EKG Q-T INTERVAL: 358 MS
EKG QRS DURATION: 78 MS
EKG QTC CALCULATION (BAZETT): 445 MS
EKG R AXIS: 12 DEGREES
EKG T AXIS: 9 DEGREES
EKG VENTRICULAR RATE: 93 BPM
EOSINOPHILS ABSOLUTE: 0 K/UL (ref 0–0.6)
EOSINOPHILS RELATIVE PERCENT: 0.1 %
GFR AFRICAN AMERICAN: >60
GFR NON-AFRICAN AMERICAN: >60
GLUCOSE BLD-MCNC: 112 MG/DL (ref 70–99)
HCT VFR BLD CALC: 31 % (ref 40.5–52.5)
HEMOGLOBIN: 10.5 G/DL (ref 13.5–17.5)
L. PNEUMOPHILA SEROGP 1 UR AG: NORMAL
LYMPHOCYTES ABSOLUTE: 0.7 K/UL (ref 1–5.1)
LYMPHOCYTES RELATIVE PERCENT: 7.1 %
MCH RBC QN AUTO: 29.4 PG (ref 26–34)
MCHC RBC AUTO-ENTMCNC: 33.9 G/DL (ref 31–36)
MCV RBC AUTO: 86.6 FL (ref 80–100)
MONOCYTES ABSOLUTE: 0.2 K/UL (ref 0–1.3)
MONOCYTES RELATIVE PERCENT: 1.5 %
NEUTROPHILS ABSOLUTE: 9.4 K/UL (ref 1.7–7.7)
NEUTROPHILS RELATIVE PERCENT: 91.1 %
PDW BLD-RTO: 13 % (ref 12.4–15.4)
PLATELET # BLD: 176 K/UL (ref 135–450)
PMV BLD AUTO: 7.9 FL (ref 5–10.5)
POTASSIUM REFLEX MAGNESIUM: 5 MMOL/L (ref 3.5–5.1)
RBC # BLD: 3.58 M/UL (ref 4.2–5.9)
SODIUM BLD-SCNC: 140 MMOL/L (ref 136–145)
STREP PNEUMONIAE ANTIGEN, URINE: NORMAL
WBC # BLD: 10.3 K/UL (ref 4–11)

## 2019-08-19 PROCEDURE — 87046 STOOL CULTR AEROBIC BACT EA: CPT

## 2019-08-19 PROCEDURE — 87505 NFCT AGENT DETECTION GI: CPT

## 2019-08-19 PROCEDURE — 6360000002 HC RX W HCPCS: Performed by: INTERNAL MEDICINE

## 2019-08-19 PROCEDURE — 80048 BASIC METABOLIC PNL TOTAL CA: CPT

## 2019-08-19 PROCEDURE — 86738 MYCOPLASMA ANTIBODY: CPT

## 2019-08-19 PROCEDURE — 2700000000 HC OXYGEN THERAPY PER DAY

## 2019-08-19 PROCEDURE — 85025 COMPLETE CBC W/AUTO DIFF WBC: CPT

## 2019-08-19 PROCEDURE — 93010 ELECTROCARDIOGRAM REPORT: CPT | Performed by: INTERNAL MEDICINE

## 2019-08-19 PROCEDURE — 36415 COLL VENOUS BLD VENIPUNCTURE: CPT

## 2019-08-19 PROCEDURE — 2500000003 HC RX 250 WO HCPCS: Performed by: INTERNAL MEDICINE

## 2019-08-19 PROCEDURE — 87070 CULTURE OTHR SPECIMN AEROBIC: CPT

## 2019-08-19 PROCEDURE — 1200000000 HC SEMI PRIVATE

## 2019-08-19 PROCEDURE — 6370000000 HC RX 637 (ALT 250 FOR IP): Performed by: INTERNAL MEDICINE

## 2019-08-19 PROCEDURE — 94761 N-INVAS EAR/PLS OXIMETRY MLT: CPT

## 2019-08-19 PROCEDURE — 2580000003 HC RX 258: Performed by: INTERNAL MEDICINE

## 2019-08-19 PROCEDURE — 87205 SMEAR GRAM STAIN: CPT

## 2019-08-19 PROCEDURE — 87486 CHLMYD PNEUM DNA AMP PROBE: CPT

## 2019-08-19 RX ADMIN — ROSUVASTATIN CALCIUM 40 MG: 10 TABLET, FILM COATED ORAL at 21:51

## 2019-08-19 RX ADMIN — ENOXAPARIN SODIUM 40 MG: 40 INJECTION SUBCUTANEOUS at 10:32

## 2019-08-19 RX ADMIN — OXYCODONE AND ACETAMINOPHEN 1 TABLET: 7.5; 325 TABLET ORAL at 21:50

## 2019-08-19 RX ADMIN — SODIUM CHLORIDE: 9 INJECTION, SOLUTION INTRAVENOUS at 21:49

## 2019-08-19 RX ADMIN — CEFTRIAXONE SODIUM 1 G: 1 INJECTION, POWDER, FOR SOLUTION INTRAMUSCULAR; INTRAVENOUS at 06:07

## 2019-08-19 RX ADMIN — DOXYCYCLINE 100 MG: 100 INJECTION, POWDER, LYOPHILIZED, FOR SOLUTION INTRAVENOUS at 21:49

## 2019-08-19 RX ADMIN — ONDANSETRON 4 MG: 2 INJECTION INTRAMUSCULAR; INTRAVENOUS at 17:17

## 2019-08-19 RX ADMIN — DOXYCYCLINE 100 MG: 100 INJECTION, POWDER, LYOPHILIZED, FOR SOLUTION INTRAVENOUS at 10:31

## 2019-08-19 RX ADMIN — ONDANSETRON 4 MG: 2 INJECTION INTRAMUSCULAR; INTRAVENOUS at 10:36

## 2019-08-19 RX ADMIN — OXYCODONE AND ACETAMINOPHEN 1 TABLET: 7.5; 325 TABLET ORAL at 10:17

## 2019-08-19 RX ADMIN — CLOPIDOGREL BISULFATE 75 MG: 75 TABLET ORAL at 21:50

## 2019-08-19 RX ADMIN — ALPRAZOLAM 1 MG: 1 TABLET ORAL at 21:50

## 2019-08-19 RX ADMIN — ASPIRIN 81 MG 81 MG: 81 TABLET ORAL at 21:50

## 2019-08-19 ASSESSMENT — PAIN DESCRIPTION - LOCATION
LOCATION: BACK
LOCATION: BACK

## 2019-08-19 ASSESSMENT — PAIN SCALES - GENERAL
PAINLEVEL_OUTOF10: 10
PAINLEVEL_OUTOF10: 10

## 2019-08-19 ASSESSMENT — PAIN DESCRIPTION - ORIENTATION: ORIENTATION: LOWER

## 2019-08-19 ASSESSMENT — PAIN DESCRIPTION - DESCRIPTORS: DESCRIPTORS: BURNING

## 2019-08-19 ASSESSMENT — PAIN DESCRIPTION - PAIN TYPE: TYPE: CHRONIC PAIN

## 2019-08-19 NOTE — ED PROVIDER NOTES
  nitroGLYCERIN (NITRODUR) 0.4 MG/HR, Place 1 patch onto the skin daily, Disp: 30 patch, Rfl: 0    rosuvastatin (CRESTOR) 40 MG tablet, Take 40 mg by mouth every evening, Disp: , Rfl:     aspirin 81 MG chewable tablet, Take 1 tablet by mouth daily, Disp: 30 tablet, Rfl: 11    nitroGLYCERIN (NITROSTAT) 0.4 MG SL tablet, up to max of 3 total doses. If no relief after 1 dose, call 911., Disp: 25 tablet, Rfl: 3    clopidogrel (PLAVIX) 75 MG tablet, Take 1 tablet by mouth daily, Disp: 30 tablet, Rfl: 11    ALPRAZolam (XANAX) 1 MG tablet, Take 1 mg by mouth nightly as needed for Sleep., Disp: , Rfl:     oxyCODONE-acetaminophen (PERCOCET) 7.5-325 MG per tablet, Take 1 tablet by mouth every 6 hours as needed for Pain. Max 3 a day, Disp: 30 tablet, Rfl: 0    zolpidem (AMBIEN CR) 12.5 MG CR tablet, Take 1 tablet by mouth nightly as needed for Sleep.  (Patient taking differently: Take 10 mg by mouth nightly as needed for Sleep.), Disp: 30 tablet, Rfl: 1    Allergies   Allergen Reactions    Penicillins        Social History     Socioeconomic History    Marital status:      Spouse name: Not on file    Number of children: Not on file    Years of education: Not on file    Highest education level: Not on file   Occupational History    Not on file   Social Needs    Financial resource strain: Not on file    Food insecurity:     Worry: Not on file     Inability: Not on file    Transportation needs:     Medical: Not on file     Non-medical: Not on file   Tobacco Use    Smoking status: Former Smoker     Packs/day: 1.00     Years: 10.00     Pack years: 10.00     Types: Cigarettes     Last attempt to quit: 10/13/2018     Years since quittin.8    Smokeless tobacco: Never Used   Substance and Sexual Activity    Alcohol use: No    Drug use: No    Sexual activity: Yes   Lifestyle    Physical activity:     Days per week: Not on file     Minutes per session: Not on file    Stress: Not on file   Relationships No cervical adenopathy. CHEST: basilar rales. No wheezing  HEART:  Regular rate and regular rhythm. No murmurs. Strong and equal pulses in the upper and lower extremities. ABDOMEN: Soft,  nondistended, positive bowel sounds. abdomen is nontender. No rebound. no guarding. MUSCULOSKELETAL: The calves are nontender to palpation. Active range of motion of the upper and lower extremities. No edema. NEUROLOGICAL: Awake, alert and oriented x 3. Power intact in the upper and lower extremities. DERMATOLOGIC: No petechiae, rashes, or ecchymoses. No erythema. PSYCH: normal mood and affect. Normal thought content. ED COURSE AND MEDICAL DECISION MAKING:    EKG as interpreted by myself:  normal sinus rhythm with a rate of 93  Axis is   Normal  QTc is  normal  Intervals and Durations are unremarkable. T invesrion 3, avf   Compared to prior EKG dated 6/4/19, T inversion inferiorly new    Radiology:  Films have been read by radiologist as noted in chart unless otherwise stated. Other radiologic studies (i.e. CT, MRI, ultrasounds, etc ) have been interpreted by radiologist.     XR CHEST STANDARD (2 VW)   Preliminary Result   Bilateral interstitial lung opacities are likely infectious.              Labs:  Results for orders placed or performed during the hospital encounter of 08/18/19   Comprehensive Metabolic Panel   Result Value Ref Range    Sodium 135 (L) 136 - 145 mmol/L    Potassium 4.3 3.5 - 5.1 mmol/L    Chloride 94 (L) 99 - 110 mmol/L    CO2 30 21 - 32 mmol/L    Anion Gap 11 3 - 16    Glucose 125 (H) 70 - 99 mg/dL    BUN 16 7 - 20 mg/dL    CREATININE 0.9 0.9 - 1.3 mg/dL    GFR Non-African American >60 >60    GFR African American >60 >60    Calcium 9.7 8.3 - 10.6 mg/dL    Total Protein 7.7 6.4 - 8.2 g/dL    Alb 3.5 3.4 - 5.0 g/dL    Albumin/Globulin Ratio 0.8 (L) 1.1 - 2.2    Total Bilirubin 0.8 0.0 - 1.0 mg/dL    Alkaline Phosphatase 135 (H) 40 - 129 U/L    ALT 46 (H) 10 - 40 U/L    AST 63 (H) 15 - 37 U/L Globulin 4.2 g/dL   CBC Auto Differential   Result Value Ref Range    WBC 14.3 (H) 4.0 - 11.0 K/uL    RBC 3.94 (L) 4.20 - 5.90 M/uL    Hemoglobin 11.4 (L) 13.5 - 17.5 g/dL    Hematocrit 33.8 (L) 40.5 - 52.5 %    MCV 85.8 80.0 - 100.0 fL    MCH 29.0 26.0 - 34.0 pg    MCHC 33.8 31.0 - 36.0 g/dL    RDW 13.1 12.4 - 15.4 %    Platelets 745 076 - 381 K/uL    MPV 8.2 5.0 - 10.5 fL    Neutrophils % 95.5 %    Lymphocytes % 2.2 %    Monocytes % 2.1 %    Eosinophils % 0.0 %    Basophils % 0.2 %    Neutrophils # 13.7 (H) 1.7 - 7.7 K/uL    Lymphocytes # 0.3 (L) 1.0 - 5.1 K/uL    Monocytes # 0.3 0.0 - 1.3 K/uL    Eosinophils # 0.0 0.0 - 0.6 K/uL    Basophils # 0.0 0.0 - 0.2 K/uL   Lipase   Result Value Ref Range    Lipase 7.0 (L) 13.0 - 60.0 U/L   Troponin   Result Value Ref Range    Troponin <0.01 <0.01 ng/mL   Lactate, Sepsis   Result Value Ref Range    Lactic Acid, Sepsis 0.8 0.4 - 1.9 mmol/L   Urinalysis Reflex to Culture   Result Value Ref Range    Color, UA Yellow Straw/Yellow    Clarity, UA Clear Clear    Glucose, Ur Negative Negative mg/dL    Bilirubin Urine SMALL (A) Negative    Ketones, Urine Negative Negative mg/dL    Specific Gravity, UA 1.015 1.005 - 1.030    Blood, Urine Negative Negative    pH, UA 6.5 5.0 - 8.0    Protein,  (A) Negative mg/dL    Urobilinogen, Urine 4.0 (A) <2.0 E.U./dL    Nitrite, Urine Negative Negative    Leukocyte Esterase, Urine Negative Negative    Microscopic Examination YES     Urine Type Not Specified    CK   Result Value Ref Range    Total CK 92 39 - 308 U/L   EKG 12 Lead   Result Value Ref Range    Ventricular Rate 93 BPM    Atrial Rate 93 BPM    P-R Interval 130 ms    QRS Duration 78 ms    Q-T Interval 358 ms    QTc Calculation (Bazett) 445 ms    P Axis 39 degrees    R Axis 12 degrees    T Axis 9 degrees    Diagnosis       Normal sinus rhythmNormal ECGWhen compared with ECG of 04-JUN-2019 16:30,Vent.  rate has increased BY  48 BPMQuestionable change in QRS axisST no longer elevated

## 2019-08-19 NOTE — PROGRESS NOTES
Pt admitted to c3 from ED. Oriented to room, call light and POC. Pt denies needs currently. Assessment complete and charted. Pt remains on 2 L NC. Call light within reach, will continue to monitor.

## 2019-08-20 ENCOUNTER — APPOINTMENT (OUTPATIENT)
Dept: GENERAL RADIOLOGY | Age: 49
DRG: 871 | End: 2019-08-20
Payer: COMMERCIAL

## 2019-08-20 LAB
ANION GAP SERPL CALCULATED.3IONS-SCNC: 11 MMOL/L (ref 3–16)
BUN BLDV-MCNC: 12 MG/DL (ref 7–20)
CALCIUM SERPL-MCNC: 9.1 MG/DL (ref 8.3–10.6)
CHLORIDE BLD-SCNC: 99 MMOL/L (ref 99–110)
CO2: 28 MMOL/L (ref 21–32)
CREAT SERPL-MCNC: 0.8 MG/DL (ref 0.9–1.3)
GFR AFRICAN AMERICAN: >60
GFR NON-AFRICAN AMERICAN: >60
GI BACTERIAL PATHOGENS BY PCR: NORMAL
GLUCOSE BLD-MCNC: 109 MG/DL (ref 70–99)
HCT VFR BLD CALC: 31.9 % (ref 40.5–52.5)
HEMOGLOBIN: 11 G/DL (ref 13.5–17.5)
MCH RBC QN AUTO: 29.5 PG (ref 26–34)
MCHC RBC AUTO-ENTMCNC: 34.4 G/DL (ref 31–36)
MCV RBC AUTO: 85.9 FL (ref 80–100)
PDW BLD-RTO: 13.5 % (ref 12.4–15.4)
PLATELET # BLD: 212 K/UL (ref 135–450)
PMV BLD AUTO: 8.2 FL (ref 5–10.5)
POTASSIUM REFLEX MAGNESIUM: 4.3 MMOL/L (ref 3.5–5.1)
RBC # BLD: 3.71 M/UL (ref 4.2–5.9)
SODIUM BLD-SCNC: 138 MMOL/L (ref 136–145)
WBC # BLD: 10.3 K/UL (ref 4–11)

## 2019-08-20 PROCEDURE — 6370000000 HC RX 637 (ALT 250 FOR IP): Performed by: INTERNAL MEDICINE

## 2019-08-20 PROCEDURE — 74220 X-RAY XM ESOPHAGUS 1CNTRST: CPT

## 2019-08-20 PROCEDURE — 6360000002 HC RX W HCPCS: Performed by: INTERNAL MEDICINE

## 2019-08-20 PROCEDURE — 85027 COMPLETE CBC AUTOMATED: CPT

## 2019-08-20 PROCEDURE — 36415 COLL VENOUS BLD VENIPUNCTURE: CPT

## 2019-08-20 PROCEDURE — 1200000000 HC SEMI PRIVATE

## 2019-08-20 PROCEDURE — 80048 BASIC METABOLIC PNL TOTAL CA: CPT

## 2019-08-20 PROCEDURE — 2580000003 HC RX 258: Performed by: INTERNAL MEDICINE

## 2019-08-20 PROCEDURE — 6370000000 HC RX 637 (ALT 250 FOR IP): Performed by: REGISTERED NURSE

## 2019-08-20 PROCEDURE — 2500000003 HC RX 250 WO HCPCS: Performed by: INTERNAL MEDICINE

## 2019-08-20 RX ORDER — ALPRAZOLAM 0.25 MG/1
0.5 TABLET ORAL NIGHTLY PRN
Status: DISCONTINUED | OUTPATIENT
Start: 2019-08-20 | End: 2019-08-23

## 2019-08-20 RX ORDER — HYDROXYZINE HYDROCHLORIDE 10 MG/1
25 TABLET, FILM COATED ORAL 3 TIMES DAILY PRN
Status: DISCONTINUED | OUTPATIENT
Start: 2019-08-20 | End: 2019-08-26 | Stop reason: HOSPADM

## 2019-08-20 RX ORDER — ROSUVASTATIN CALCIUM 10 MG/1
20 TABLET, COATED ORAL EVERY EVENING
Status: DISCONTINUED | OUTPATIENT
Start: 2019-08-20 | End: 2019-08-26 | Stop reason: HOSPADM

## 2019-08-20 RX ORDER — RANOLAZINE 500 MG/1
500 TABLET, EXTENDED RELEASE ORAL 2 TIMES DAILY
Status: DISCONTINUED | OUTPATIENT
Start: 2019-08-20 | End: 2019-08-26 | Stop reason: HOSPADM

## 2019-08-20 RX ORDER — ZOLPIDEM TARTRATE 10 MG/1
10 TABLET ORAL NIGHTLY PRN
COMMUNITY

## 2019-08-20 RX ORDER — RANOLAZINE 500 MG/1
500 TABLET, EXTENDED RELEASE ORAL 2 TIMES DAILY
Status: ON HOLD | COMMUNITY
End: 2020-10-15 | Stop reason: HOSPADM

## 2019-08-20 RX ORDER — OXYCODONE AND ACETAMINOPHEN 7.5; 325 MG/1; MG/1
1 TABLET ORAL 2 TIMES DAILY
COMMUNITY

## 2019-08-20 RX ORDER — ROSUVASTATIN CALCIUM 20 MG/1
20 TABLET, COATED ORAL NIGHTLY
COMMUNITY

## 2019-08-20 RX ORDER — ALPRAZOLAM 0.5 MG/1
0.5 TABLET ORAL NIGHTLY PRN
COMMUNITY

## 2019-08-20 RX ORDER — PANTOPRAZOLE SODIUM 40 MG/1
40 TABLET, DELAYED RELEASE ORAL
Status: DISCONTINUED | OUTPATIENT
Start: 2019-08-20 | End: 2019-08-21

## 2019-08-20 RX ADMIN — RANOLAZINE 500 MG: 500 TABLET, FILM COATED, EXTENDED RELEASE ORAL at 22:34

## 2019-08-20 RX ADMIN — ENOXAPARIN SODIUM 40 MG: 40 INJECTION SUBCUTANEOUS at 10:16

## 2019-08-20 RX ADMIN — ASPIRIN 81 MG 81 MG: 81 TABLET ORAL at 22:34

## 2019-08-20 RX ADMIN — ROSUVASTATIN CALCIUM 20 MG: 10 TABLET, FILM COATED ORAL at 22:34

## 2019-08-20 RX ADMIN — DOXYCYCLINE 100 MG: 100 INJECTION, POWDER, LYOPHILIZED, FOR SOLUTION INTRAVENOUS at 10:16

## 2019-08-20 RX ADMIN — CLOPIDOGREL BISULFATE 75 MG: 75 TABLET ORAL at 22:34

## 2019-08-20 RX ADMIN — OXYCODONE AND ACETAMINOPHEN 1 TABLET: 7.5; 325 TABLET ORAL at 22:34

## 2019-08-20 RX ADMIN — RANOLAZINE 500 MG: 500 TABLET, FILM COATED, EXTENDED RELEASE ORAL at 14:24

## 2019-08-20 RX ADMIN — ONDANSETRON 4 MG: 2 INJECTION INTRAMUSCULAR; INTRAVENOUS at 10:16

## 2019-08-20 RX ADMIN — DOXYCYCLINE 100 MG: 100 INJECTION, POWDER, LYOPHILIZED, FOR SOLUTION INTRAVENOUS at 22:34

## 2019-08-20 RX ADMIN — Medication 10 ML: at 22:36

## 2019-08-20 RX ADMIN — OXYCODONE AND ACETAMINOPHEN 1 TABLET: 7.5; 325 TABLET ORAL at 10:16

## 2019-08-20 RX ADMIN — ACETAMINOPHEN 650 MG: 325 TABLET ORAL at 17:10

## 2019-08-20 RX ADMIN — ALPRAZOLAM 0.5 MG: 0.25 TABLET ORAL at 22:40

## 2019-08-20 RX ADMIN — PANTOPRAZOLE SODIUM 40 MG: 40 TABLET, DELAYED RELEASE ORAL at 12:20

## 2019-08-20 RX ADMIN — CEFTRIAXONE SODIUM 1 G: 1 INJECTION, POWDER, FOR SOLUTION INTRAMUSCULAR; INTRAVENOUS at 06:17

## 2019-08-20 ASSESSMENT — PAIN SCALES - GENERAL
PAINLEVEL_OUTOF10: 10
PAINLEVEL_OUTOF10: 10
PAINLEVEL_OUTOF10: 9

## 2019-08-20 NOTE — PROGRESS NOTES
Hospitalist Progress Note      PCP: Yomi Celestin MD    Date of Admission: 8/18/2019    Chief Complaint: Nausea, vomiting, diarrhea, fever and cough    Hospital Course:   55 yo male who presented to [facility] ED with complaints of nausea, vomiting and diarrhea that started earlier today, severe intensity, multiple episodes. Also complains of shortness of breath, cough with whitish sputum, subjective fevers and night sweats since the last 3 days. Pt reports he feels like he has the flu, also has body aches and decreased appetite. Diarrhea is watery, nonbloody and nonmucoid. Vomitus is nonbilious, no hematemesis. Pt does report being exposed to a sick coworker. No recent travel. Also complains of nasal congestion and headaches. Subjective:   Pt is on 2 LPM. Low grade fever of 99 this morning. VSS. No dyspnea or chest pain. + non-productive cough. No N/V/D. Medications:  Reviewed    Infusion Medications     Scheduled Medications    pantoprazole  40 mg Oral QAM AC    nitroGLYCERIN  1 patch Transdermal Daily    rosuvastatin  40 mg Oral QPM    sodium chloride flush  10 mL Intravenous 2 times per day    enoxaparin  40 mg Subcutaneous Daily    cefTRIAXone (ROCEPHIN) IV  1 g Intravenous Q24H    And    doxycycline (VIBRAMYCIN) IV  100 mg Intravenous Q12H    aspirin  81 mg Oral Nightly    clopidogrel  75 mg Oral Nightly     PRN Meds: ALPRAZolam, oxyCODONE-acetaminophen, sodium chloride flush, magnesium hydroxide, ondansetron, acetaminophen      Intake/Output Summary (Last 24 hours) at 8/20/2019 1351  Last data filed at 8/20/2019 0535  Gross per 24 hour   Intake 840 ml   Output --   Net 840 ml       Physical Exam Performed:    /67   Pulse 84   Temp 99 °F (37.2 °C) (Oral)   Resp 18   Ht 5' 8\" (1.727 m)   Wt 149 lb 4.8 oz (67.7 kg)   SpO2 (!) 87%   BMI 22.70 kg/m²     General appearance: No apparent distress, appears stated age and cooperative.   HEENT: Pupils equal, round, and reactive to

## 2019-08-21 ENCOUNTER — APPOINTMENT (OUTPATIENT)
Dept: CT IMAGING | Age: 49
DRG: 871 | End: 2019-08-21
Payer: COMMERCIAL

## 2019-08-21 ENCOUNTER — APPOINTMENT (OUTPATIENT)
Dept: GENERAL RADIOLOGY | Age: 49
DRG: 871 | End: 2019-08-21
Payer: COMMERCIAL

## 2019-08-21 LAB
A/G RATIO: 0.6 (ref 1.1–2.2)
ALBUMIN SERPL-MCNC: 2.6 G/DL (ref 3.4–5)
ALP BLD-CCNC: 149 U/L (ref 40–129)
ALT SERPL-CCNC: 22 U/L (ref 10–40)
ANION GAP SERPL CALCULATED.3IONS-SCNC: 11 MMOL/L (ref 3–16)
APPEARANCE BAL (LAVAGE): CLEAR
AST SERPL-CCNC: 33 U/L (ref 15–37)
BASE EXCESS ARTERIAL: 4.5 MMOL/L (ref -3–3)
BASE EXCESS ARTERIAL: 7 (ref -3–3)
BASOPHILS ABSOLUTE: 0 K/UL (ref 0–0.2)
BASOPHILS RELATIVE PERCENT: 0.1 %
BILIRUB SERPL-MCNC: 0.7 MG/DL (ref 0–1)
BUN BLDV-MCNC: 13 MG/DL (ref 7–20)
CALCIUM SERPL-MCNC: 9.2 MG/DL (ref 8.3–10.6)
CARBOXYHEMOGLOBIN ARTERIAL: 0.5 % (ref 0–1.5)
CHLORIDE BLD-SCNC: 97 MMOL/L (ref 99–110)
CLOT EVALUATION BAL: ABNORMAL
CO2: 29 MMOL/L (ref 21–32)
COLOR LAVAGE: COLORLESS
CREAT SERPL-MCNC: 0.9 MG/DL (ref 0.9–1.3)
CULTURE, RESPIRATORY: NORMAL
EOSINOPHILS ABSOLUTE: 0 K/UL (ref 0–0.6)
EOSINOPHILS RELATIVE PERCENT: 0.1 %
EPITHELIAL CELLS FLUID: 10 %
GFR AFRICAN AMERICAN: >60
GFR NON-AFRICAN AMERICAN: >60
GLOBULIN: 4.2 G/DL
GLUCOSE BLD-MCNC: 121 MG/DL (ref 70–99)
GLUCOSE BLD-MCNC: 125 MG/DL (ref 70–99)
GRAM STAIN RESULT: NORMAL
HCO3 ARTERIAL: 28.5 MMOL/L (ref 21–29)
HCO3 ARTERIAL: 31.2 MMOL/L (ref 21–29)
HCT VFR BLD CALC: 31.6 % (ref 40.5–52.5)
HEMOGLOBIN, ART, EXTENDED: 11.5 G/DL (ref 13.5–17.5)
HEMOGLOBIN: 10.6 G/DL (ref 13.5–17.5)
LACTIC ACID: 1.4 MMOL/L (ref 0.4–2)
LYMPHOCYTES ABSOLUTE: 0.3 K/UL (ref 1–5.1)
LYMPHOCYTES RELATIVE PERCENT: 2.7 %
LYMPHOCYTES, BAL: 5 % (ref 5–10)
MCH RBC QN AUTO: 29.1 PG (ref 26–34)
MCHC RBC AUTO-ENTMCNC: 33.6 G/DL (ref 31–36)
MCV RBC AUTO: 86.5 FL (ref 80–100)
METHEMOGLOBIN ARTERIAL: 0.8 %
MONOCYTES ABSOLUTE: 0.3 K/UL (ref 0–1.3)
MONOCYTES RELATIVE PERCENT: 2.2 %
NEUTROPHILS ABSOLUTE: 11 K/UL (ref 1.7–7.7)
NEUTROPHILS RELATIVE PERCENT: 94.9 %
NUMBER OF CELLS COUNTED BAL (LAVAGE): 200
O2 CONTENT ARTERIAL: 16 ML/DL
O2 SAT, ARTERIAL: 95 % (ref 93–100)
O2 SAT, ARTERIAL: 96.5 %
O2 THERAPY: ABNORMAL
PCO2 ARTERIAL: 39.9 MMHG (ref 35–45)
PCO2 ARTERIAL: 50.1 MM HG (ref 35–45)
PDW BLD-RTO: 13.5 % (ref 12.4–15.4)
PERFORMED ON: ABNORMAL
PH ARTERIAL: 7.4 (ref 7.35–7.45)
PH ARTERIAL: 7.47 (ref 7.35–7.45)
PLATELET # BLD: 244 K/UL (ref 135–450)
PMV BLD AUTO: 7.9 FL (ref 5–10.5)
PO2 ARTERIAL: 78 MM HG (ref 75–108)
PO2 ARTERIAL: 89.9 MMHG (ref 75–108)
POC SAMPLE TYPE: ABNORMAL
POTASSIUM REFLEX MAGNESIUM: 4.3 MMOL/L (ref 3.5–5.1)
PRO-BNP: 664 PG/ML (ref 0–124)
RAPID INFLUENZA  B AGN: NEGATIVE
RAPID INFLUENZA A AGN: NEGATIVE
RBC # BLD: 3.66 M/UL (ref 4.2–5.9)
RBC, BAL: 86 /CUMM
SEGMENTED NEUTROPHILS, BAL: 85 % (ref 5–10)
SODIUM BLD-SCNC: 137 MMOL/L (ref 136–145)
TCO2 ARTERIAL: 29.7 MMOL/L
TCO2 ARTERIAL: 33 MMOL/L
TOTAL PROTEIN: 6.8 G/DL (ref 6.4–8.2)
WBC # BLD: 11.5 K/UL (ref 4–11)
WBC/EPI CELLS BAL: 137 /CUMM

## 2019-08-21 PROCEDURE — 2709999900 HC NON-CHARGEABLE SUPPLY: Performed by: INTERNAL MEDICINE

## 2019-08-21 PROCEDURE — 0BH17EZ INSERTION OF ENDOTRACHEAL AIRWAY INTO TRACHEA, VIA NATURAL OR ARTIFICIAL OPENING: ICD-10-PCS | Performed by: INTERNAL MEDICINE

## 2019-08-21 PROCEDURE — 87206 SMEAR FLUORESCENT/ACID STAI: CPT

## 2019-08-21 PROCEDURE — 87804 INFLUENZA ASSAY W/OPTIC: CPT

## 2019-08-21 PROCEDURE — 88312 SPECIAL STAINS GROUP 1: CPT

## 2019-08-21 PROCEDURE — 31624 DX BRONCHOSCOPE/LAVAGE: CPT | Performed by: INTERNAL MEDICINE

## 2019-08-21 PROCEDURE — 83880 ASSAY OF NATRIURETIC PEPTIDE: CPT

## 2019-08-21 PROCEDURE — 6360000002 HC RX W HCPCS: Performed by: INTERNAL MEDICINE

## 2019-08-21 PROCEDURE — 87040 BLOOD CULTURE FOR BACTERIA: CPT

## 2019-08-21 PROCEDURE — 94761 N-INVAS EAR/PLS OXIMETRY MLT: CPT

## 2019-08-21 PROCEDURE — 89051 BODY FLUID CELL COUNT: CPT

## 2019-08-21 PROCEDURE — 87070 CULTURE OTHR SPECIMN AEROBIC: CPT

## 2019-08-21 PROCEDURE — 3609010900 HC BRONCHOSCOPY THERAPUTIC ASPIRATION INITIAL: Performed by: INTERNAL MEDICINE

## 2019-08-21 PROCEDURE — 94770 HC ETCO2 MONITOR DAILY: CPT

## 2019-08-21 PROCEDURE — 2000000000 HC ICU R&B

## 2019-08-21 PROCEDURE — 2580000003 HC RX 258: Performed by: INTERNAL MEDICINE

## 2019-08-21 PROCEDURE — 87205 SMEAR GRAM STAIN: CPT

## 2019-08-21 PROCEDURE — 71045 X-RAY EXAM CHEST 1 VIEW: CPT

## 2019-08-21 PROCEDURE — 6370000000 HC RX 637 (ALT 250 FOR IP): Performed by: INTERNAL MEDICINE

## 2019-08-21 PROCEDURE — 87102 FUNGUS ISOLATION CULTURE: CPT

## 2019-08-21 PROCEDURE — 80053 COMPREHEN METABOLIC PANEL: CPT

## 2019-08-21 PROCEDURE — 71250 CT THORAX DX C-: CPT

## 2019-08-21 PROCEDURE — 31645 BRNCHSC W/THER ASPIR 1ST: CPT | Performed by: INTERNAL MEDICINE

## 2019-08-21 PROCEDURE — 94002 VENT MGMT INPAT INIT DAY: CPT

## 2019-08-21 PROCEDURE — 87116 MYCOBACTERIA CULTURE: CPT

## 2019-08-21 PROCEDURE — 88305 TISSUE EXAM BY PATHOLOGIST: CPT

## 2019-08-21 PROCEDURE — 0B918ZZ DRAINAGE OF TRACHEA, VIA NATURAL OR ARTIFICIAL OPENING ENDOSCOPIC: ICD-10-PCS | Performed by: INTERNAL MEDICINE

## 2019-08-21 PROCEDURE — 2500000003 HC RX 250 WO HCPCS: Performed by: INTERNAL MEDICINE

## 2019-08-21 PROCEDURE — 0B938ZZ DRAINAGE OF RIGHT MAIN BRONCHUS, VIA NATURAL OR ARTIFICIAL OPENING ENDOSCOPIC: ICD-10-PCS | Performed by: INTERNAL MEDICINE

## 2019-08-21 PROCEDURE — 99291 CRITICAL CARE FIRST HOUR: CPT | Performed by: INTERNAL MEDICINE

## 2019-08-21 PROCEDURE — 82947 ASSAY GLUCOSE BLOOD QUANT: CPT

## 2019-08-21 PROCEDURE — 5A1945Z RESPIRATORY VENTILATION, 24-96 CONSECUTIVE HOURS: ICD-10-PCS | Performed by: INTERNAL MEDICINE

## 2019-08-21 PROCEDURE — 83605 ASSAY OF LACTIC ACID: CPT

## 2019-08-21 PROCEDURE — 36415 COLL VENOUS BLD VENIPUNCTURE: CPT

## 2019-08-21 PROCEDURE — 3609010800 HC BRONCHOSCOPY ALVEOLAR LAVAGE: Performed by: INTERNAL MEDICINE

## 2019-08-21 PROCEDURE — 36556 INSERT NON-TUNNEL CV CATH: CPT | Performed by: INTERNAL MEDICINE

## 2019-08-21 PROCEDURE — 82803 BLOOD GASES ANY COMBINATION: CPT

## 2019-08-21 PROCEDURE — 31500 INSERT EMERGENCY AIRWAY: CPT | Performed by: INTERNAL MEDICINE

## 2019-08-21 PROCEDURE — 94750 HC PULMONARY COMPLIANCE STUDY: CPT

## 2019-08-21 PROCEDURE — 88112 CYTOPATH CELL ENHANCE TECH: CPT

## 2019-08-21 PROCEDURE — 85025 COMPLETE CBC W/AUTO DIFF WBC: CPT

## 2019-08-21 PROCEDURE — 51702 INSERT TEMP BLADDER CATH: CPT

## 2019-08-21 PROCEDURE — 0B9D8ZX DRAINAGE OF RIGHT MIDDLE LUNG LOBE, VIA NATURAL OR ARTIFICIAL OPENING ENDOSCOPIC, DIAGNOSTIC: ICD-10-PCS | Performed by: INTERNAL MEDICINE

## 2019-08-21 PROCEDURE — 36556 INSERT NON-TUNNEL CV CATH: CPT

## 2019-08-21 PROCEDURE — 6370000000 HC RX 637 (ALT 250 FOR IP): Performed by: REGISTERED NURSE

## 2019-08-21 PROCEDURE — 99292 CRITICAL CARE ADDL 30 MIN: CPT | Performed by: INTERNAL MEDICINE

## 2019-08-21 PROCEDURE — 87015 SPECIMEN INFECT AGNT CONCNTJ: CPT

## 2019-08-21 PROCEDURE — 2700000000 HC OXYGEN THERAPY PER DAY

## 2019-08-21 PROCEDURE — 05HM33Z INSERTION OF INFUSION DEVICE INTO RIGHT INTERNAL JUGULAR VEIN, PERCUTANEOUS APPROACH: ICD-10-PCS | Performed by: INTERNAL MEDICINE

## 2019-08-21 PROCEDURE — 0B978ZZ DRAINAGE OF LEFT MAIN BRONCHUS, VIA NATURAL OR ARTIFICIAL OPENING ENDOSCOPIC: ICD-10-PCS | Performed by: INTERNAL MEDICINE

## 2019-08-21 RX ORDER — SODIUM CHLORIDE 0.9 % (FLUSH) 0.9 %
10 SYRINGE (ML) INJECTION EVERY 12 HOURS SCHEDULED
Status: DISCONTINUED | OUTPATIENT
Start: 2019-08-21 | End: 2019-08-21 | Stop reason: SDUPTHER

## 2019-08-21 RX ORDER — MIDAZOLAM HYDROCHLORIDE 5 MG/ML
5 INJECTION INTRAMUSCULAR; INTRAVENOUS ONCE
Status: COMPLETED | OUTPATIENT
Start: 2019-08-21 | End: 2019-08-21

## 2019-08-21 RX ORDER — MAGNESIUM HYDROXIDE 1200 MG/15ML
LIQUID ORAL CONTINUOUS PRN
Status: COMPLETED | OUTPATIENT
Start: 2019-08-21 | End: 2019-08-21

## 2019-08-21 RX ORDER — METHYLPREDNISOLONE SODIUM SUCCINATE 40 MG/ML
40 INJECTION, POWDER, LYOPHILIZED, FOR SOLUTION INTRAMUSCULAR; INTRAVENOUS EVERY 6 HOURS
Status: DISCONTINUED | OUTPATIENT
Start: 2019-08-21 | End: 2019-08-22

## 2019-08-21 RX ORDER — PANTOPRAZOLE SODIUM 40 MG/10ML
40 INJECTION, POWDER, LYOPHILIZED, FOR SOLUTION INTRAVENOUS DAILY
Status: DISCONTINUED | OUTPATIENT
Start: 2019-08-21 | End: 2019-08-23

## 2019-08-21 RX ORDER — PROPOFOL 10 MG/ML
INJECTION, EMULSION INTRAVENOUS
Status: DISPENSED
Start: 2019-08-21 | End: 2019-08-21

## 2019-08-21 RX ORDER — CARBOXYMETHYLCELLULOSE SODIUM 10 MG/ML
1 GEL OPHTHALMIC 4 TIMES DAILY
Status: DISCONTINUED | OUTPATIENT
Start: 2019-08-21 | End: 2019-08-23

## 2019-08-21 RX ORDER — CHLORHEXIDINE GLUCONATE 0.12 MG/ML
15 RINSE ORAL 2 TIMES DAILY
Status: DISCONTINUED | OUTPATIENT
Start: 2019-08-21 | End: 2019-08-23

## 2019-08-21 RX ORDER — LEVOFLOXACIN 5 MG/ML
750 INJECTION, SOLUTION INTRAVENOUS EVERY 24 HOURS
Status: DISCONTINUED | OUTPATIENT
Start: 2019-08-21 | End: 2019-08-22

## 2019-08-21 RX ORDER — LIDOCAINE HYDROCHLORIDE 10 MG/ML
5 INJECTION, SOLUTION INFILTRATION; PERINEURAL ONCE
Status: DISCONTINUED | OUTPATIENT
Start: 2019-08-21 | End: 2019-08-26 | Stop reason: HOSPADM

## 2019-08-21 RX ORDER — PROPOFOL 10 MG/ML
10 INJECTION, EMULSION INTRAVENOUS
Status: DISCONTINUED | OUTPATIENT
Start: 2019-08-21 | End: 2019-08-23

## 2019-08-21 RX ORDER — SODIUM CHLORIDE 9 MG/ML
INJECTION, SOLUTION INTRAVENOUS
Status: DISPENSED
Start: 2019-08-21 | End: 2019-08-22

## 2019-08-21 RX ORDER — SODIUM CHLORIDE 0.9 % (FLUSH) 0.9 %
10 SYRINGE (ML) INJECTION PRN
Status: DISCONTINUED | OUTPATIENT
Start: 2019-08-21 | End: 2019-08-21 | Stop reason: SDUPTHER

## 2019-08-21 RX ADMIN — LEVOFLOXACIN 750 MG: 5 INJECTION, SOLUTION INTRAVENOUS at 09:06

## 2019-08-21 RX ADMIN — FENTANYL CITRATE 100 MCG/HR: 50 INJECTION INTRAVENOUS at 10:45

## 2019-08-21 RX ADMIN — MIDAZOLAM HYDROCHLORIDE 5 MG: 5 INJECTION, SOLUTION INTRAMUSCULAR; INTRAVENOUS at 10:55

## 2019-08-21 RX ADMIN — ACETAMINOPHEN 650 MG: 325 TABLET ORAL at 21:02

## 2019-08-21 RX ADMIN — RANOLAZINE 500 MG: 500 TABLET, FILM COATED, EXTENDED RELEASE ORAL at 21:03

## 2019-08-21 RX ADMIN — CHLORHEXIDINE GLUCONATE 15 ML: 1.2 RINSE ORAL at 21:04

## 2019-08-21 RX ADMIN — FENTANYL CITRATE 200 MCG/HR: 50 INJECTION INTRAVENOUS at 15:35

## 2019-08-21 RX ADMIN — Medication 10 ML: at 21:04

## 2019-08-21 RX ADMIN — CEFEPIME HYDROCHLORIDE 2 G: 2 INJECTION, POWDER, FOR SOLUTION INTRAVENOUS at 21:03

## 2019-08-21 RX ADMIN — CLOPIDOGREL BISULFATE 75 MG: 75 TABLET ORAL at 21:03

## 2019-08-21 RX ADMIN — MUPIROCIN: 20 OINTMENT TOPICAL at 21:03

## 2019-08-21 RX ADMIN — PROPOFOL 15 MCG/KG/MIN: 10 INJECTION, EMULSION INTRAVENOUS at 10:18

## 2019-08-21 RX ADMIN — ROSUVASTATIN CALCIUM 20 MG: 10 TABLET, FILM COATED ORAL at 21:03

## 2019-08-21 RX ADMIN — ENOXAPARIN SODIUM 40 MG: 40 INJECTION SUBCUTANEOUS at 21:03

## 2019-08-21 RX ADMIN — CARBOXYMETHYLCELLULOSE SODIUM 1 DROP: 10 GEL OPHTHALMIC at 21:03

## 2019-08-21 RX ADMIN — METHYLPREDNISOLONE SODIUM SUCCINATE 40 MG: 40 INJECTION, POWDER, FOR SOLUTION INTRAMUSCULAR; INTRAVENOUS at 17:51

## 2019-08-21 RX ADMIN — DEXTROSE MONOHYDRATE 2 MCG/MIN: 50 INJECTION, SOLUTION INTRAVENOUS at 11:49

## 2019-08-21 RX ADMIN — ASPIRIN 81 MG 81 MG: 81 TABLET ORAL at 21:03

## 2019-08-21 RX ADMIN — FENTANYL CITRATE 200 MCG/HR: 50 INJECTION INTRAVENOUS at 20:54

## 2019-08-21 RX ADMIN — CARBOXYMETHYLCELLULOSE SODIUM 1 DROP: 10 GEL OPHTHALMIC at 17:51

## 2019-08-21 RX ADMIN — MIDAZOLAM 1 MG/HR: 5 INJECTION INTRAMUSCULAR; INTRAVENOUS at 11:55

## 2019-08-21 ASSESSMENT — PULMONARY FUNCTION TESTS
PIF_VALUE: 16
PIF_VALUE: 26
PIF_VALUE: 32
PIF_VALUE: 22
PIF_VALUE: 26

## 2019-08-21 ASSESSMENT — PAIN SCALES - GENERAL: PAINLEVEL_OUTOF10: 9

## 2019-08-21 NOTE — CARE COORDINATION
Chart reviewed patient with fever and increasing SOB. Plan for transfer to ICU level of care. CM will continue to follow.  Cheri Vizcaino RN

## 2019-08-21 NOTE — OP NOTE
Preoperative Diagnosis:  1. Multifocal pneumonia  2. Acute resp failure  3. Septic shock    Postoperative Diagnosis:  1. Multifocal pneumonia  2. Acute resp failure  3. Septic shock    Procedure Performed:  1. Flexible bronchoscopy  2. Therapeutic aspiration of endobronchial secretions  3. Bronchoalveolar lavage of right middle lobe    Findings:  1. Improved airway patency after aspiration of secretions from the tracheobronchial tree    Recommendations:  1. Await results of collected specimen    Indications:  Jackelyn Goyal is a pleasant 54year old male who has had worsening respiratory failure and multifocal infiltrates despite infection. Bronchoscopy indicated for further evaluation of findings and to assist with pulmonary toileting. ASA 4, Mallampati 3    Procedure Details: The patient was correctly identified as  Jackelyn Goyal, a safety timeout was performed. An adult therapeutic bronchoscope was inserted through the endotracheal tube and into the airways. Tenacious clear secretions were aspirated from the tracheobronchial tree with subsequent improved airway patency. An airway exam was then performed, all subsegments were well visualized and did not appear to show any acute abnormality. The bronchoscope was wedged into the right middle lobe and a bronchoalveolar lavage was performed. Bronchoscope was withdrawn and the procedure was terminated. There was no immediate complications, the patient tolerated the procedure well. Estimated blood loss was less than 1 cc.      Specimens:  RML BAL

## 2019-08-21 NOTE — PROGRESS NOTES
08/21/19 1013   Vent Information   $Ventilation $Initial Day   Ventilator Started Yes   Vent Type 840   Vent Mode AC/VC  (verbal orders by Dr. Esther Yeung)   Vt Ordered 450 mL   Rate Set 16 bmp   Peak Flow 50 L/min   FiO2  70 %   PEEP/CPAP 10   Cuff Pressure (cm H2O) 32 cm H2O   Humidification Source HME   Vent Patient Data   Peak Inspiratory Pressure 26 cmH2O   Mean Airway Pressure 15 cmH20   Rate Measured 16 br/min   Vt Exhaled 385 mL   Minute Volume 6.03 Liters   I:E Ratio 1:2.8   Plateau Pressure 23 BMG64   Static Compliance 29.62 mL/cmH2O   Dynamic Compliance 24.06 mL/cmH2O   Cough/Sputum   Sputum How Obtained None   Spontaneous Breathing Trial (SBT) RT Doc   Pulse 110   SpO2 99 %   Breath Sounds   Right Upper Lobe Clear;Diminished   Right Middle Lobe Diminished   Right Lower Lobe Clear;Diminished   Left Upper Lobe Diminished   Left Lower Lobe Diminished   Additional Respiratory  Assessments   Resp 16   End Tidal CO2 49 (%)   Alarm Settings   High Pressure Alarm 45 cmH2O   Low Minute Volume Alarm 2 L/min   High Respiratory Rate 45 br/min   Low Exhaled Vt  200 mL   Patient Observation   Observations attempted intubation by maxine Wolf, RRT AND second attempt by Dr. Mandeep Bowser orders by Dr. Esther Yeung ambu bag and mask at bedside, alarms on and audible, apnea parameters on

## 2019-08-21 NOTE — PLAN OF CARE
Pt is rating pain 10/10. Pain goal is 6/10. Pt medicated with PRN Percocet as ordered. Will continue to monitor.

## 2019-08-21 NOTE — CONSULTS
Nutrition Assessment (Enteral Nutrition)    Type and Reason for Visit: Reassess, Consult(TF ordering and mgmt)    Nutrition Recommendations:   1. Recommend TF initiation once OG placed and verified. Order: \"Diet: Tube feed continuous/ NPO\". Initiate Vital low calorie, high protein formula at 10 mL/hr and as tolerated, increase by 10 mL/hr q 4 hours until goal of 65 mL/hr is met via O/G   2. Recommend 30 mL H20 flush q 4 hours. Monitor IVF infusion, Na labs and need for adjustments in water flush  3. Ensure head of bed is at least 30 - 45 degrees during continuous feeding. 4. Monitor TF tolerance (abd distention, bowel habits, N/V, cramping)  5. Check TG q 3 days while on diprivan infusion  6. Monitor nutrition adequacy, pertinent labs, bowel habits, wt changes, and clinical progress      Nutrition Assessment: Pt brought down to ICU d/t worsening resp failure requiring intubation this am. He was not eating well on the floor d/t N/V on admission. Currently Sedated on propofol at 6.1 mL/hr (161 kcals from fat). NPO. NG/OG to be placed and start TF. (Note GI bacterial pathogens checked and were negative.)    Malnutrition Assessment:  · Malnutrition Status: At risk for malnutrition    Nutrition Risk Level: High    Nutrition Needs:  · Estimated Daily Total Kcal: 4246-2278  · Estimated Daily Protein (g):   · Estimated Daily Fluid (ml/day): 1 mL/kcal     Nutrition Diagnosis:   · Problem: Inadequate oral intake  · Etiology: related to Insufficient energy/nutrient consumption     Signs and symptoms:  as evidenced by NPO status due to medical condition, Intubation    Objective Information:  · Nutrition-Focused Physical Findings: Intubated now.    · Current Nutrition Therapies:  · Oral Diet Orders: NPO   · Oral Diet intake: NPO  · Oral Nutrition Supplement (ONS) Orders: None  · ONS intake: NPO  · Tube Feeding (TF) Orders:   · Feeding Route: Nasogastric  · Formula: Low Calorie, High Protein  · Duration: Continuous  · Goal TF & Flush Orders Provides: Recommend Vital high protein formula at goal of 65 mL/hr x 24 hrs to provide 1560 kcals and TV, 136 gm protein, and 1304 mL free water. 30 mL free water flush q 4 hrs to maintain tube integrity  · Anthropometric Measures:  · Ht: 5' 8\" (172.7 cm)   · Current Body Wt: 149 lb (67.6 kg)  · Ideal Body Wt: 154 lb (69.9 kg)   · BMI Classification: BMI 18.5 - 24.9 Normal Weight    Nutrition Interventions:   Start Tube Feeding  Continued Inpatient Monitoring    Nutrition Evaluation:   · Evaluation: Progress towards goals declining   · Goals: Tolerate diet and consume greater than 50% of meals and ONS this admission   · Monitoring: TF Intake, TF Tolerance, Pertinent Labs, Weight      Electronically signed by Noah Marks RD, LD on 8/21/19 at 10:35 AM    Contact Number: [phone]

## 2019-08-21 NOTE — PROGRESS NOTES
08/21/19 1444   Vent Information   Vent Type 840   Vent Mode AC/VC   Vt Ordered 450 mL   Rate Set 16 bmp   FiO2  40 %  (DECREASED TO 40%)   Sensitivity 3   PEEP/CPAP 5   Cuff Pressure (cm H2O) 32 cm H2O   Humidification Source HME  (HME CHANGED)   Vent Patient Data   Peak Inspiratory Pressure 22 cmH2O   Mean Airway Pressure 12 cmH20   Rate Measured 20 br/min   Vt Exhaled 417 mL   Minute Volume 8.12 Liters   I:E Ratio 1:2.2   Plateau Pressure 19 OOH37   Static Compliance 29.78 mL/cmH2O   Dynamic Compliance 24.52 mL/cmH2O   Patient Transport   Time spent transporting 31-45   Transport ventillation type Transport vent   Transport from ICU   Transport destination CT scan   Emergency equipment included Yes   Cough/Sputum   Sputum How Obtained Oral;Endotracheal;Suctioned   Cough Non-productive   Sputum Amount None   Breath Sounds   Right Upper Lobe Diminished   Right Middle Lobe Diminished   Right Lower Lobe Diminished   Left Upper Lobe Diminished   Left Lower Lobe Diminished   Additional Respiratory  Assessments   Oral Care Mouth suctioned   Alarm Settings   High Pressure Alarm 45 cmH2O   Low Minute Volume Alarm 2 L/min   Apnea (secs) 45 secs   Low Exhaled Vt  200 mL   Patient Observation   Observations AMBU BAG  AND MASK AT BEDSIDE, ALARMS ON AND AUDIBLE, APNEA PARAMETERS ON   ETT (adult)   Placement Date: 08/21/19   Type: Cuffed  Tube Size: 8 mm  Laryngoscope: GlideScope  Blade Size: 4  Location: Oral  Insertion attempts: 2  Placement Verified By[de-identified] Auscultation;Capnometry; Colorimetric EtCO2 device  Secured at: 24 cm  Placed By: Licensed. ..    Secured at 24 cm   Measured From Nare   ET Placement Left   Secured By Commercial tube soto   Site Condition Dry

## 2019-08-21 NOTE — PROGRESS NOTES
08/21/19 1159   Vent Information   Vent Type 840   Vent Mode AC/VC   Vt Ordered 450 mL   Rate Set 16 bmp   Peak Flow 50 L/min   FiO2  50 %   PEEP/CPAP 5   Cuff Pressure (cm H2O) 32 cm H2O   Humidification Source HME   Vent Patient Data   Peak Inspiratory Pressure 32 cmH2O   Rate Measured 14 br/min   Vt Exhaled 433 mL   Minute Volume 15.2 Liters   I:E Ratio 1:2.1   Cough/Sputum   Sputum How Obtained Endotracheal;Oral;Suctioned   Cough Non-productive   Spontaneous Breathing Trial (SBT) RT Doc   Pulse 91   SpO2 100 %   Breath Sounds   Right Upper Lobe Expiratory Wheezes   Right Middle Lobe Diminished   Right Lower Lobe Expiratory Wheezes   Left Upper Lobe Diminished   Left Lower Lobe Diminished   Additional Respiratory  Assessments   Resp 25   End Tidal CO2 36 (%)   Oral Care Mouth suctioned   Alarm Settings   High Pressure Alarm 45 cmH2O   Low Minute Volume Alarm 2 L/min   High Respiratory Rate 45 br/min   Low Exhaled Vt  200 mL   ETT (adult)   Placement Date: 08/21/19   Type: Cuffed  Tube Size: 8 mm  Laryngoscope: GlideScope  Blade Size: 4  Location: Oral  Insertion attempts: 2  Placement Verified By[de-identified] Auscultation;Capnometry; Colorimetric EtCO2 device  Secured at: 24 cm  Placed By: Licensed. ..    Secured at 24 cm   Measured From Lips   ET Placement Left   Secured By Commercial tube soto   Site Condition Dry

## 2019-08-22 ENCOUNTER — APPOINTMENT (OUTPATIENT)
Dept: GENERAL RADIOLOGY | Age: 49
DRG: 871 | End: 2019-08-22
Payer: COMMERCIAL

## 2019-08-22 LAB
A/G RATIO: 0.6 (ref 1.1–2.2)
ALBUMIN SERPL-MCNC: 2.5 G/DL (ref 3.4–5)
ALP BLD-CCNC: 183 U/L (ref 40–129)
ALT SERPL-CCNC: 21 U/L (ref 10–40)
ANION GAP SERPL CALCULATED.3IONS-SCNC: 11 MMOL/L (ref 3–16)
AST SERPL-CCNC: 38 U/L (ref 15–37)
BASE EXCESS ARTERIAL: 3.8 MMOL/L (ref -3–3)
BASOPHILS ABSOLUTE: 0 K/UL (ref 0–0.2)
BASOPHILS RELATIVE PERCENT: 0 %
BILIRUB SERPL-MCNC: 0.4 MG/DL (ref 0–1)
BUN BLDV-MCNC: 21 MG/DL (ref 7–20)
CALCIUM SERPL-MCNC: 9.6 MG/DL (ref 8.3–10.6)
CARBOXYHEMOGLOBIN ARTERIAL: 0.8 % (ref 0–1.5)
CHLORIDE BLD-SCNC: 99 MMOL/L (ref 99–110)
CO2: 30 MMOL/L (ref 21–32)
CREAT SERPL-MCNC: 0.8 MG/DL (ref 0.9–1.3)
EOSINOPHILS ABSOLUTE: 0 K/UL (ref 0–0.6)
EOSINOPHILS RELATIVE PERCENT: 0 %
GFR AFRICAN AMERICAN: >60
GFR NON-AFRICAN AMERICAN: >60
GLOBULIN: 4.3 G/DL
GLUCOSE BLD-MCNC: 154 MG/DL (ref 70–99)
GLUCOSE BLD-MCNC: 155 MG/DL (ref 70–99)
GLUCOSE BLD-MCNC: 172 MG/DL (ref 70–99)
GLUCOSE BLD-MCNC: 176 MG/DL (ref 70–99)
GLUCOSE BLD-MCNC: 178 MG/DL (ref 70–99)
HCO3 ARTERIAL: 30.8 MMOL/L (ref 21–29)
HCT VFR BLD CALC: 30.8 % (ref 40.5–52.5)
HEMOGLOBIN, ART, EXTENDED: 13.9 G/DL (ref 13.5–17.5)
HEMOGLOBIN: 10.4 G/DL (ref 13.5–17.5)
LYMPHOCYTES ABSOLUTE: 0.3 K/UL (ref 1–5.1)
LYMPHOCYTES RELATIVE PERCENT: 2.8 %
MCH RBC QN AUTO: 29.4 PG (ref 26–34)
MCHC RBC AUTO-ENTMCNC: 33.8 G/DL (ref 31–36)
MCV RBC AUTO: 87 FL (ref 80–100)
METHEMOGLOBIN ARTERIAL: 0.2 %
MONOCYTES ABSOLUTE: 0.1 K/UL (ref 0–1.3)
MONOCYTES RELATIVE PERCENT: 1.3 %
NEUTROPHILS ABSOLUTE: 9.3 K/UL (ref 1.7–7.7)
NEUTROPHILS RELATIVE PERCENT: 95.9 %
O2 CONTENT ARTERIAL: 19 ML/DL
O2 SAT, ARTERIAL: 95.8 %
O2 THERAPY: ABNORMAL
PCO2 ARTERIAL: 56.7 MMHG (ref 35–45)
PDW BLD-RTO: 13.6 % (ref 12.4–15.4)
PERFORMED ON: ABNORMAL
PH ARTERIAL: 7.35 (ref 7.35–7.45)
PLATELET # BLD: 284 K/UL (ref 135–450)
PMV BLD AUTO: 8.1 FL (ref 5–10.5)
PO2 ARTERIAL: 89.2 MMHG (ref 75–108)
POTASSIUM REFLEX MAGNESIUM: 4.2 MMOL/L (ref 3.5–5.1)
PROCALCITONIN: 0.87 NG/ML (ref 0–0.15)
RBC # BLD: 3.54 M/UL (ref 4.2–5.9)
SODIUM BLD-SCNC: 140 MMOL/L (ref 136–145)
TCO2 ARTERIAL: 32.6 MMOL/L
TOTAL PROTEIN: 6.8 G/DL (ref 6.4–8.2)
TRIGL SERPL-MCNC: 90 MG/DL (ref 0–150)
WBC # BLD: 9.7 K/UL (ref 4–11)

## 2019-08-22 PROCEDURE — 6370000000 HC RX 637 (ALT 250 FOR IP): Performed by: INTERNAL MEDICINE

## 2019-08-22 PROCEDURE — 6360000002 HC RX W HCPCS: Performed by: INTERNAL MEDICINE

## 2019-08-22 PROCEDURE — 2580000003 HC RX 258: Performed by: INTERNAL MEDICINE

## 2019-08-22 PROCEDURE — 84478 ASSAY OF TRIGLYCERIDES: CPT

## 2019-08-22 PROCEDURE — 84145 PROCALCITONIN (PCT): CPT

## 2019-08-22 PROCEDURE — 99291 CRITICAL CARE FIRST HOUR: CPT | Performed by: INTERNAL MEDICINE

## 2019-08-22 PROCEDURE — 94770 HC ETCO2 MONITOR DAILY: CPT

## 2019-08-22 PROCEDURE — 6360000002 HC RX W HCPCS: Performed by: REGISTERED NURSE

## 2019-08-22 PROCEDURE — 6370000000 HC RX 637 (ALT 250 FOR IP): Performed by: REGISTERED NURSE

## 2019-08-22 PROCEDURE — 94750 HC PULMONARY COMPLIANCE STUDY: CPT

## 2019-08-22 PROCEDURE — C9113 INJ PANTOPRAZOLE SODIUM, VIA: HCPCS | Performed by: REGISTERED NURSE

## 2019-08-22 PROCEDURE — 82803 BLOOD GASES ANY COMBINATION: CPT

## 2019-08-22 PROCEDURE — 85025 COMPLETE CBC W/AUTO DIFF WBC: CPT

## 2019-08-22 PROCEDURE — 80053 COMPREHEN METABOLIC PANEL: CPT

## 2019-08-22 PROCEDURE — 94003 VENT MGMT INPAT SUBQ DAY: CPT

## 2019-08-22 PROCEDURE — 2000000000 HC ICU R&B

## 2019-08-22 PROCEDURE — 6360000002 HC RX W HCPCS: Performed by: NURSE PRACTITIONER

## 2019-08-22 PROCEDURE — 94761 N-INVAS EAR/PLS OXIMETRY MLT: CPT

## 2019-08-22 PROCEDURE — 2700000000 HC OXYGEN THERAPY PER DAY

## 2019-08-22 PROCEDURE — 71045 X-RAY EXAM CHEST 1 VIEW: CPT

## 2019-08-22 RX ORDER — DEXTROSE MONOHYDRATE 50 MG/ML
100 INJECTION, SOLUTION INTRAVENOUS PRN
Status: DISCONTINUED | OUTPATIENT
Start: 2019-08-22 | End: 2019-08-26 | Stop reason: HOSPADM

## 2019-08-22 RX ORDER — DEXTROSE MONOHYDRATE 25 G/50ML
12.5 INJECTION, SOLUTION INTRAVENOUS PRN
Status: DISCONTINUED | OUTPATIENT
Start: 2019-08-22 | End: 2019-08-26 | Stop reason: HOSPADM

## 2019-08-22 RX ORDER — METHYLPREDNISOLONE SODIUM SUCCINATE 40 MG/ML
40 INJECTION, POWDER, LYOPHILIZED, FOR SOLUTION INTRAMUSCULAR; INTRAVENOUS EVERY 12 HOURS
Status: DISCONTINUED | OUTPATIENT
Start: 2019-08-22 | End: 2019-08-25

## 2019-08-22 RX ORDER — NICOTINE POLACRILEX 4 MG
15 LOZENGE BUCCAL PRN
Status: DISCONTINUED | OUTPATIENT
Start: 2019-08-22 | End: 2019-08-26 | Stop reason: HOSPADM

## 2019-08-22 RX ADMIN — PANTOPRAZOLE SODIUM 40 MG: 40 INJECTION, POWDER, FOR SOLUTION INTRAVENOUS at 08:41

## 2019-08-22 RX ADMIN — ASPIRIN 81 MG 81 MG: 81 TABLET ORAL at 20:05

## 2019-08-22 RX ADMIN — CARBOXYMETHYLCELLULOSE SODIUM 1 DROP: 10 GEL OPHTHALMIC at 20:05

## 2019-08-22 RX ADMIN — RANOLAZINE 500 MG: 500 TABLET, FILM COATED, EXTENDED RELEASE ORAL at 20:05

## 2019-08-22 RX ADMIN — VANCOMYCIN HYDROCHLORIDE 1000 MG: 10 INJECTION, POWDER, LYOPHILIZED, FOR SOLUTION INTRAVENOUS at 21:52

## 2019-08-22 RX ADMIN — PROPOFOL 30 MCG/KG/MIN: 10 INJECTION, EMULSION INTRAVENOUS at 10:19

## 2019-08-22 RX ADMIN — VANCOMYCIN HYDROCHLORIDE 1000 MG: 10 INJECTION, POWDER, LYOPHILIZED, FOR SOLUTION INTRAVENOUS at 12:12

## 2019-08-22 RX ADMIN — ENOXAPARIN SODIUM 40 MG: 40 INJECTION SUBCUTANEOUS at 08:41

## 2019-08-22 RX ADMIN — CLOPIDOGREL BISULFATE 75 MG: 75 TABLET ORAL at 20:05

## 2019-08-22 RX ADMIN — FENTANYL CITRATE 200 MCG/HR: 50 INJECTION INTRAVENOUS at 13:22

## 2019-08-22 RX ADMIN — METHYLPREDNISOLONE SODIUM SUCCINATE 40 MG: 40 INJECTION, POWDER, FOR SOLUTION INTRAMUSCULAR; INTRAVENOUS at 17:48

## 2019-08-22 RX ADMIN — FENTANYL CITRATE 150 MCG/HR: 50 INJECTION INTRAVENOUS at 02:37

## 2019-08-22 RX ADMIN — FENTANYL CITRATE 200 MCG/HR: 50 INJECTION INTRAVENOUS at 07:49

## 2019-08-22 RX ADMIN — Medication 10 ML: at 08:42

## 2019-08-22 RX ADMIN — INSULIN LISPRO 1 UNITS: 100 INJECTION, SOLUTION INTRAVENOUS; SUBCUTANEOUS at 12:18

## 2019-08-22 RX ADMIN — MUPIROCIN: 20 OINTMENT TOPICAL at 08:41

## 2019-08-22 RX ADMIN — PROPOFOL 20 MCG/KG/MIN: 10 INJECTION, EMULSION INTRAVENOUS at 19:37

## 2019-08-22 RX ADMIN — METHYLPREDNISOLONE SODIUM SUCCINATE 40 MG: 40 INJECTION, POWDER, FOR SOLUTION INTRAMUSCULAR; INTRAVENOUS at 00:57

## 2019-08-22 RX ADMIN — Medication 10 ML: at 20:06

## 2019-08-22 RX ADMIN — CHLORHEXIDINE GLUCONATE 15 ML: 1.2 RINSE ORAL at 20:07

## 2019-08-22 RX ADMIN — INSULIN LISPRO 1 UNITS: 100 INJECTION, SOLUTION INTRAVENOUS; SUBCUTANEOUS at 09:24

## 2019-08-22 RX ADMIN — FENTANYL CITRATE 125 MCG/HR: 50 INJECTION INTRAVENOUS at 20:17

## 2019-08-22 RX ADMIN — MUPIROCIN: 20 OINTMENT TOPICAL at 21:52

## 2019-08-22 RX ADMIN — CARBOXYMETHYLCELLULOSE SODIUM 1 DROP: 10 GEL OPHTHALMIC at 13:23

## 2019-08-22 RX ADMIN — CEFEPIME HYDROCHLORIDE 2 G: 2 INJECTION, POWDER, FOR SOLUTION INTRAVENOUS at 20:06

## 2019-08-22 RX ADMIN — LEVOFLOXACIN 750 MG: 5 INJECTION, SOLUTION INTRAVENOUS at 08:45

## 2019-08-22 RX ADMIN — CHLORHEXIDINE GLUCONATE 15 ML: 1.2 RINSE ORAL at 08:42

## 2019-08-22 RX ADMIN — INSULIN LISPRO 1 UNITS: 100 INJECTION, SOLUTION INTRAVENOUS; SUBCUTANEOUS at 16:18

## 2019-08-22 RX ADMIN — METHYLPREDNISOLONE SODIUM SUCCINATE 40 MG: 40 INJECTION, POWDER, FOR SOLUTION INTRAMUSCULAR; INTRAVENOUS at 06:18

## 2019-08-22 RX ADMIN — CEFEPIME HYDROCHLORIDE 2 G: 2 INJECTION, POWDER, FOR SOLUTION INTRAVENOUS at 08:44

## 2019-08-22 RX ADMIN — CARBOXYMETHYLCELLULOSE SODIUM 1 DROP: 10 GEL OPHTHALMIC at 17:48

## 2019-08-22 RX ADMIN — MIDAZOLAM 7 MG/HR: 5 INJECTION INTRAMUSCULAR; INTRAVENOUS at 04:38

## 2019-08-22 RX ADMIN — ROSUVASTATIN CALCIUM 20 MG: 10 TABLET, FILM COATED ORAL at 20:05

## 2019-08-22 RX ADMIN — INSULIN LISPRO 1 UNITS: 100 INJECTION, SOLUTION INTRAVENOUS; SUBCUTANEOUS at 20:49

## 2019-08-22 ASSESSMENT — PULMONARY FUNCTION TESTS
PIF_VALUE: 14
PIF_VALUE: 17
PIF_VALUE: 17
PIF_VALUE: 16
PIF_VALUE: 17
PIF_VALUE: 15
PIF_VALUE: 16
PIF_VALUE: 16
PIF_VALUE: 17
PIF_VALUE: 23

## 2019-08-22 NOTE — PROGRESS NOTES
With Veserd at 6mg/h and fentanyl at 150mcg/hr patient has respiratory rate of 37 and agitated lifting head and shoulders off bed.  SAT not attempted

## 2019-08-22 NOTE — PROGRESS NOTES
· Anthropometric Measures:  · Ht: 5' 8\" (172.7 cm)   · Current Body Wt: 149 lb (67.6 kg)  · Ideal Body Wt: 154 lb (69.9 kg)   · BMI Classification: BMI 18.5 - 24.9 Normal Weight    Nutrition Interventions:   Continue current Tube Feeding  Continued Inpatient Monitoring    Nutrition Evaluation:   · Evaluation: Progressing toward goals   · Goals: Tolerate nutrition support at goal    · Monitoring: TF Intake, TF Tolerance, Pertinent Labs, Weight      Electronically signed by Kiet Mcqueen.  Ryan Vallecillo RD, LD on 8/22/19 at 1:23 PM    Contact Number: [phone]

## 2019-08-22 NOTE — PROGRESS NOTES
Radiology:  XR CHEST PORTABLE   Final Result   Interval placement of a right internal jugular central venous catheter with   tip overlying the lower right atrium, 5 cm beyond the cavoatrial junction. Consider several cm retraction. Multifocal airspace disease with increasing consolidation in the right base. Aeration in the upper lungs is slightly improved. CT CHEST ABDOMEN PELVIS WO CONTRAST   Final Result   Extensive bilateral airspace disease could represent pulmonary edema or   multifocal pneumonia. No evidence of barium aspiration. No acute intra-abdominal abnormality, though there is streak artifact from   dense barium throughout the colon. Lucent expansile lesion in the left inferior pubic ramus which was present on   prior study from 2016 and likely represents a benign etiology such as   aneurysmal bone cyst.         XR CHEST PORTABLE   Final Result   Endotracheal tube tip in the mid trachea. Enteric tube tip in the body of the stomach. Slight interval improvement in bilateral airspace disease. XR CHEST PORTABLE   Final Result   Significant interval increase in bilateral airspace disease which could   represent multifocal pneumonia, pulmonary edema or developing ARDS. Increased size of the cardiac silhouette suspicious for pericardial effusion. FL ESOPHAGRAM   Final Result   Tiny sliding hiatal hernia, with trace esophageal reflux         XR CHEST STANDARD (2 VW)   Final Result   Bilateral interstitial lung opacities are likely infectious.            Assessment/Plan:    Active Hospital Problems    Diagnosis    Nausea vomiting and diarrhea [R11.2, R19.7]    PNA (pneumonia) [J18.9]    Hypoxia [R09.02]    Combined hyperlipidemia [E78.2]    Coronary artery disease involving native coronary artery of native heart with unstable angina pectoris ([facility] [address] [I25.110]    Tobacco use disorder [F17.200]    HTN (hypertension) [I10]       Acute hypoxic respiratory failure secondary to pneumonia (CAP), ARDS  - Pt presented with cough/sob/fever/leukocytosis. - Chest imaging on admission reveals infiltrates bilateral interstitial pattern. - Blood cultures are NGTD, urine legionella and pneumococcal antigens are negative, sputum culture is pending.   - Continue pulmonary toilet with acapella and IS. - supplemental O2 therapy  - SLP evaluation -- no signs of aspiration.   - esophagram unremarkable  - currently intubated, vent management per pulm  - Repeat cxr with diffuse infiltrates concerning for evolving ARDS  - started IV rocephin and doxycycline (s 8/19). Abx changed to levaquin, cefepime on 8/21. Sepsis   - 2/2 above  - developed fevers, hypotension while admitted  - on levophed currently. Sedation likely contributing to hypotension  - currently on levaquin, cefepime    Nausea, vomiting, diarrhea:  - Continue supportive care, likely viral etiology or secondary to the pneumonia itself. GI bacterial pathogens are negative. Pt is having formed stools now so C. Diff is unlikely. - esophagram unremarkable  - currently intubated on TF. Will continue to monitor once extubated     Hyperlipidemia:  - Continue statin.     Coronary artery disease:   - Status post PCI to diag 1 with POBA/cutting balloon in 6/2019.  - Stable and asymptomatic; continue ranexa, aspirin, Plavix, statin.     Tobacco use:  - Quit 1 month ago. Encouraged ongoing abstinence.      Anxiety/chronic pain:  - Continue home xanax and percocet.        DVT Prophylaxis: Lovenox   Diet: DIET TUBE FEED CONTINUOUS/CYCLIC NPO; Low Calorie High Protein; Orogastric; Continuous; 10; 65  Code Status: Full Code    PT/OT Eval Status: Not indicated     Dispo - critically in in ICU    Mick Torres MD

## 2019-08-22 NOTE — CARE COORDINATION
CM went to bedside to introduce self. No family present and patient on vent. Initial assessment done on 8/19/19 patient is independent prior to admission and denied any needs. CM will follow and readdress once extubated.   Aleah Llamas RN

## 2019-08-22 NOTE — PLAN OF CARE
Problem: Falls - Risk of:  Goal: Will remain free from falls  Description  Will remain free from falls  Outcome: Ongoing  Goal: Absence of physical injury  Description  Absence of physical injury  Outcome: Ongoing     Problem: SAFETY  Goal: Free from accidental physical injury  Outcome: Ongoing  Goal: Free from intentional harm  Outcome: Ongoing     Problem: DAILY CARE  Goal: Daily care needs are met  Outcome: Ongoing     Problem: PAIN  Goal: Patient's pain/discomfort is manageable  Outcome: Ongoing     Problem: SKIN INTEGRITY  Goal: Skin integrity is maintained or improved  Outcome: Ongoing     Problem: KNOWLEDGE DEFICIT  Goal: Patient/S.O. demonstrates understanding of disease process, treatment plan, medications, and discharge instructions. Outcome: Ongoing     Problem: DISCHARGE BARRIERS  Goal: Patient's continuum of care needs are met  Outcome: Ongoing     Problem: Nutrition  Goal: Optimal nutrition therapy  8/22/2019 1326 by Elida Mccarthy RN  Outcome: Ongoing  Note:   Nutrition Problem: Inadequate oral intake  Intervention: Food and/or Nutrient Delivery: Continue current Tube Feeding  Nutritional Goals: Tolerate nutrition support at goal        Problem: Restraint Use - Nonviolent/Non-Self-Destructive Behavior:  Goal: Absence of restraint indications  Description  Absence of restraint indications  Outcome: Ongoing  Goal: Absence of restraint-related injury  Description  Absence of restraint-related injury  Outcome: Ongoing     Problem: Risk for Impaired Skin Integrity  Goal: Tissue integrity - skin and mucous membranes  Description  Structural intactness and normal physiological function of skin and  mucous membranes.   Outcome: Ongoing     Problem: Discharge Planning:  Goal: Participates in care planning  Description  Participates in care planning  Outcome: Ongoing  Goal: Discharged to appropriate level of care  Description  Discharged to appropriate level of care  Outcome: Ongoing     Problem: Airway

## 2019-08-22 NOTE — CONSULTS
Pharmacy to Dose Vancomycin    Dx: PNA  Goal trough = 15-20 mcg/mL  Pt wt = 67 kg  Estimated Creatinine Clearance: 109 mL/min (A) (based on SCr of 0.8 mg/dL (L)). Vancomycin 1000 mg IVPB q12h. Vancomycin trough prior to 4th dose (i.e.; 8/23 at 2130). Sumit Zee.  Abraham MorganD, [facility]   8/22/2019 9:36 AM      8/23  Vanc level =11.2mcg  NG BC, sputum culture is positive for gram positive cocci according to Dr. Samson Lane note   Will increase change current regimen to 750mg q8h rather than increasing to 1,250mg q12h   Next level on  8/25 ~5:30am  Loraine Holguin/Valentina. 8/23/19 10:23 PM

## 2019-08-22 NOTE — PROGRESS NOTES
08/21/19 2004   Vent Information   Vent Type 840   Vent Mode AC/VC   Vt Ordered 450 mL   Rate Set 16 bmp   Peak Flow 70 L/min   FiO2  40 %   Sensitivity 3   PEEP/CPAP 5   Cuff Pressure (cm H2O) 30 cm H2O   Humidification Source HME   Vent Patient Data   Peak Inspiratory Pressure 26 cmH2O   Mean Airway Pressure 13 cmH20   Rate Measured 25 br/min   Vt Exhaled 422 mL   Minute Volume 9.45 Liters   I:E Ratio 1:2.4   Plateau Pressure 20 IEV46   Static Compliance 28 mL/cmH2O   Dynamic Compliance 20 mL/cmH2O   Cough/Sputum   Sputum How Obtained Endotracheal;Suctioned   Cough Productive   Sputum Amount Moderate   Sputum Color Cloudy   Tenacity Thick   Spontaneous Breathing Trial (SBT) RT Doc   Pulse 88   SpO2 99 %   Breath Sounds   Right Upper Lobe Rhonchi   Right Middle Lobe Diminished   Right Lower Lobe Diminished   Left Upper Lobe Rhonchi   Left Lower Lobe Diminished   Additional Respiratory  Assessments   Resp 23   End Tidal CO2 39 (%)   Alarm Settings   High Pressure Alarm 45 cmH2O   Low Minute Volume Alarm 2 L/min   Apnea (secs) 45 secs   Low Exhaled Vt  200 mL   ETT (adult)   Placement Date: 08/21/19   Type: Cuffed  Tube Size: 8 mm  Laryngoscope: GlideScope  Blade Size: 4  Location: Oral  Insertion attempts: 2  Placement Verified By[de-identified] Auscultation;Capnometry; Colorimetric EtCO2 device  Secured at: 24 cm  Placed By: Licensed. ..    Secured at 25 cm   Measured From Lips   ET Placement Right   Secured By Commercial tube soto

## 2019-08-23 ENCOUNTER — APPOINTMENT (OUTPATIENT)
Dept: GENERAL RADIOLOGY | Age: 49
DRG: 871 | End: 2019-08-23
Payer: COMMERCIAL

## 2019-08-23 LAB
ANION GAP SERPL CALCULATED.3IONS-SCNC: 10 MMOL/L (ref 3–16)
BASE EXCESS ARTERIAL: 4.5 MMOL/L (ref -3–3)
BASOPHILS ABSOLUTE: 0 K/UL (ref 0–0.2)
BASOPHILS RELATIVE PERCENT: 0.2 %
BLOOD CULTURE, ROUTINE: NORMAL
BUN BLDV-MCNC: 29 MG/DL (ref 7–20)
CALCIUM SERPL-MCNC: 9.3 MG/DL (ref 8.3–10.6)
CARBOXYHEMOGLOBIN ARTERIAL: 0.3 % (ref 0–1.5)
CHLORIDE BLD-SCNC: 100 MMOL/L (ref 99–110)
CO2: 31 MMOL/L (ref 21–32)
CREAT SERPL-MCNC: 0.7 MG/DL (ref 0.9–1.3)
CULTURE, BLOOD 2: NORMAL
CULTURE, RESPIRATORY: NORMAL
EOSINOPHILS ABSOLUTE: 0 K/UL (ref 0–0.6)
EOSINOPHILS RELATIVE PERCENT: 0 %
GFR AFRICAN AMERICAN: >60
GFR NON-AFRICAN AMERICAN: >60
GLUCOSE BLD-MCNC: 105 MG/DL (ref 70–99)
GLUCOSE BLD-MCNC: 124 MG/DL (ref 70–99)
GLUCOSE BLD-MCNC: 131 MG/DL (ref 70–99)
GLUCOSE BLD-MCNC: 147 MG/DL (ref 70–99)
GLUCOSE BLD-MCNC: 174 MG/DL (ref 70–99)
GLUCOSE BLD-MCNC: 193 MG/DL (ref 70–99)
GRAM STAIN RESULT: NORMAL
HCO3 ARTERIAL: 29.9 MMOL/L (ref 21–29)
HCT VFR BLD CALC: 30.4 % (ref 40.5–52.5)
HEMOGLOBIN, ART, EXTENDED: 11.1 G/DL (ref 13.5–17.5)
HEMOGLOBIN: 10 G/DL (ref 13.5–17.5)
LYMPHOCYTES ABSOLUTE: 0.4 K/UL (ref 1–5.1)
LYMPHOCYTES RELATIVE PERCENT: 2.9 %
MCH RBC QN AUTO: 28.6 PG (ref 26–34)
MCHC RBC AUTO-ENTMCNC: 32.8 G/DL (ref 31–36)
MCV RBC AUTO: 87.1 FL (ref 80–100)
METHEMOGLOBIN ARTERIAL: 0.3 %
MONOCYTES ABSOLUTE: 0.3 K/UL (ref 0–1.3)
MONOCYTES RELATIVE PERCENT: 2.2 %
MYCOPLASMA PNEUMONIAE IGM: 0.06
NEUTROPHILS ABSOLUTE: 14.4 K/UL (ref 1.7–7.7)
NEUTROPHILS RELATIVE PERCENT: 94.7 %
O2 CONTENT ARTERIAL: 15 ML/DL
O2 SAT, ARTERIAL: 94 %
O2 THERAPY: ABNORMAL
PCO2 ARTERIAL: 48 MMHG (ref 35–45)
PDW BLD-RTO: 13.3 % (ref 12.4–15.4)
PERFORMED ON: ABNORMAL
PH ARTERIAL: 7.41 (ref 7.35–7.45)
PLATELET # BLD: 317 K/UL (ref 135–450)
PMV BLD AUTO: 8.2 FL (ref 5–10.5)
PO2 ARTERIAL: 76.4 MMHG (ref 75–108)
POTASSIUM SERPL-SCNC: 4.5 MMOL/L (ref 3.5–5.1)
RBC # BLD: 3.49 M/UL (ref 4.2–5.9)
SODIUM BLD-SCNC: 141 MMOL/L (ref 136–145)
TCO2 ARTERIAL: 31.4 MMOL/L
VANCOMYCIN TROUGH: 11.2 UG/ML (ref 10–20)
WBC # BLD: 15.2 K/UL (ref 4–11)

## 2019-08-23 PROCEDURE — 82803 BLOOD GASES ANY COMBINATION: CPT

## 2019-08-23 PROCEDURE — 6360000002 HC RX W HCPCS: Performed by: INTERNAL MEDICINE

## 2019-08-23 PROCEDURE — 2580000003 HC RX 258: Performed by: INTERNAL MEDICINE

## 2019-08-23 PROCEDURE — 6360000002 HC RX W HCPCS: Performed by: NURSE PRACTITIONER

## 2019-08-23 PROCEDURE — 6370000000 HC RX 637 (ALT 250 FOR IP): Performed by: INTERNAL MEDICINE

## 2019-08-23 PROCEDURE — 2700000000 HC OXYGEN THERAPY PER DAY

## 2019-08-23 PROCEDURE — 71045 X-RAY EXAM CHEST 1 VIEW: CPT

## 2019-08-23 PROCEDURE — 92526 ORAL FUNCTION THERAPY: CPT

## 2019-08-23 PROCEDURE — 6370000000 HC RX 637 (ALT 250 FOR IP): Performed by: REGISTERED NURSE

## 2019-08-23 PROCEDURE — 74230 X-RAY XM SWLNG FUNCJ C+: CPT

## 2019-08-23 PROCEDURE — 92610 EVALUATE SWALLOWING FUNCTION: CPT

## 2019-08-23 PROCEDURE — 2000000000 HC ICU R&B

## 2019-08-23 PROCEDURE — 6360000002 HC RX W HCPCS: Performed by: REGISTERED NURSE

## 2019-08-23 PROCEDURE — 85025 COMPLETE CBC W/AUTO DIFF WBC: CPT

## 2019-08-23 PROCEDURE — C9113 INJ PANTOPRAZOLE SODIUM, VIA: HCPCS | Performed by: REGISTERED NURSE

## 2019-08-23 PROCEDURE — 94003 VENT MGMT INPAT SUBQ DAY: CPT

## 2019-08-23 PROCEDURE — 80048 BASIC METABOLIC PNL TOTAL CA: CPT

## 2019-08-23 PROCEDURE — 92611 MOTION FLUOROSCOPY/SWALLOW: CPT

## 2019-08-23 PROCEDURE — 99233 SBSQ HOSP IP/OBS HIGH 50: CPT | Performed by: INTERNAL MEDICINE

## 2019-08-23 PROCEDURE — 94761 N-INVAS EAR/PLS OXIMETRY MLT: CPT

## 2019-08-23 PROCEDURE — 80202 ASSAY OF VANCOMYCIN: CPT

## 2019-08-23 PROCEDURE — 2580000003 HC RX 258

## 2019-08-23 RX ORDER — FUROSEMIDE 10 MG/ML
40 INJECTION INTRAMUSCULAR; INTRAVENOUS ONCE
Status: COMPLETED | OUTPATIENT
Start: 2019-08-23 | End: 2019-08-23

## 2019-08-23 RX ORDER — PANTOPRAZOLE SODIUM 40 MG/1
40 TABLET, DELAYED RELEASE ORAL
Status: DISCONTINUED | OUTPATIENT
Start: 2019-08-24 | End: 2019-08-26 | Stop reason: HOSPADM

## 2019-08-23 RX ORDER — SODIUM CHLORIDE 9 MG/ML
INJECTION, SOLUTION INTRAVENOUS
Status: COMPLETED
Start: 2019-08-23 | End: 2019-08-23

## 2019-08-23 RX ORDER — ZOLPIDEM TARTRATE 5 MG/1
5 TABLET ORAL NIGHTLY PRN
Status: DISCONTINUED | OUTPATIENT
Start: 2019-08-23 | End: 2019-08-26 | Stop reason: HOSPADM

## 2019-08-23 RX ADMIN — RANOLAZINE 500 MG: 500 TABLET, FILM COATED, EXTENDED RELEASE ORAL at 23:05

## 2019-08-23 RX ADMIN — VANCOMYCIN HYDROCHLORIDE 750 MG: 10 INJECTION, POWDER, LYOPHILIZED, FOR SOLUTION INTRAVENOUS at 22:59

## 2019-08-23 RX ADMIN — CEFEPIME HYDROCHLORIDE 2 G: 2 INJECTION, POWDER, FOR SOLUTION INTRAVENOUS at 09:00

## 2019-08-23 RX ADMIN — FUROSEMIDE 40 MG: 10 INJECTION, SOLUTION INTRAMUSCULAR; INTRAVENOUS at 08:59

## 2019-08-23 RX ADMIN — ENOXAPARIN SODIUM 40 MG: 40 INJECTION SUBCUTANEOUS at 08:59

## 2019-08-23 RX ADMIN — PANTOPRAZOLE SODIUM 40 MG: 40 INJECTION, POWDER, FOR SOLUTION INTRAVENOUS at 08:59

## 2019-08-23 RX ADMIN — SODIUM CHLORIDE: 900 INJECTION, SOLUTION INTRAVENOUS at 09:15

## 2019-08-23 RX ADMIN — MUPIROCIN: 20 OINTMENT TOPICAL at 09:00

## 2019-08-23 RX ADMIN — CLOPIDOGREL BISULFATE 75 MG: 75 TABLET ORAL at 23:05

## 2019-08-23 RX ADMIN — OXYCODONE AND ACETAMINOPHEN 1 TABLET: 7.5; 325 TABLET ORAL at 16:24

## 2019-08-23 RX ADMIN — MUPIROCIN: 20 OINTMENT TOPICAL at 23:07

## 2019-08-23 RX ADMIN — ZOLPIDEM TARTRATE 5 MG: 5 TABLET ORAL at 23:06

## 2019-08-23 RX ADMIN — OXYCODONE AND ACETAMINOPHEN 1 TABLET: 7.5; 325 TABLET ORAL at 23:06

## 2019-08-23 RX ADMIN — INSULIN LISPRO 1 UNITS: 100 INJECTION, SOLUTION INTRAVENOUS; SUBCUTANEOUS at 04:52

## 2019-08-23 RX ADMIN — METHYLPREDNISOLONE SODIUM SUCCINATE 40 MG: 40 INJECTION, POWDER, FOR SOLUTION INTRAMUSCULAR; INTRAVENOUS at 17:55

## 2019-08-23 RX ADMIN — FENTANYL CITRATE 150 MCG/HR: 50 INJECTION INTRAVENOUS at 04:28

## 2019-08-23 RX ADMIN — Medication 10 ML: at 23:07

## 2019-08-23 RX ADMIN — VANCOMYCIN HYDROCHLORIDE 1000 MG: 10 INJECTION, POWDER, LYOPHILIZED, FOR SOLUTION INTRAVENOUS at 10:30

## 2019-08-23 RX ADMIN — Medication 10 ML: at 08:59

## 2019-08-23 RX ADMIN — CEFEPIME HYDROCHLORIDE 2 G: 2 INJECTION, POWDER, FOR SOLUTION INTRAVENOUS at 21:18

## 2019-08-23 RX ADMIN — INSULIN LISPRO 1 UNITS: 100 INJECTION, SOLUTION INTRAVENOUS; SUBCUTANEOUS at 23:10

## 2019-08-23 RX ADMIN — CARBOXYMETHYLCELLULOSE SODIUM 1 DROP: 10 GEL OPHTHALMIC at 08:59

## 2019-08-23 RX ADMIN — ROSUVASTATIN CALCIUM 20 MG: 10 TABLET, FILM COATED ORAL at 23:06

## 2019-08-23 RX ADMIN — METHYLPREDNISOLONE SODIUM SUCCINATE 40 MG: 40 INJECTION, POWDER, FOR SOLUTION INTRAMUSCULAR; INTRAVENOUS at 07:20

## 2019-08-23 RX ADMIN — ASPIRIN 81 MG 81 MG: 81 TABLET ORAL at 23:06

## 2019-08-23 RX ADMIN — INSULIN LISPRO 1 UNITS: 100 INJECTION, SOLUTION INTRAVENOUS; SUBCUTANEOUS at 00:55

## 2019-08-23 ASSESSMENT — PAIN SCALES - GENERAL
PAINLEVEL_OUTOF10: 7
PAINLEVEL_OUTOF10: 0
PAINLEVEL_OUTOF10: 10

## 2019-08-23 ASSESSMENT — PULMONARY FUNCTION TESTS
PIF_VALUE: 12
PIF_VALUE: 21
PIF_VALUE: 21

## 2019-08-23 NOTE — PROGRESS NOTES
8\" (1.727 m)   Wt 149 lb 4.8 oz (67.7 kg)   SpO2 96%   BMI 22.70 kg/m²          Intake/Output Summary (Last 24 hours) at 8/23/2019 1934  Last data filed at 8/23/2019 1625  Gross per 24 hour   Intake 1599 ml   Output 3055 ml   Net -1456 ml       EXAM:  General: No distress. Somewhat disheveled appearing, appears underweight. Eyes: PERRL. No sclera icterus. No conjunctival injection. ENT: Class II airway, edentulous  Neck: No JVD. RIJ CVC. Resp: No accessory muscle use. No crackles. No wheezing. No rhonchi. CV: Regular rate. Regular rhythm. No mumur or rub. No edema. GI: Non-tender. Non-distended. No masses. No organomegaly. Skin: Warm and dry. No nodule on exposed extremities. No rash on exposed extremities. Lymph: Deferred. M/S: No cyanosis. No joint deformity. No clubbing. Neuro: Awake and oriented, communicative. No obvious focal deficit. Psych: No evidence of agitation or depression.        Medications:  Scheduled Meds:   insulin lispro  0-6 Units Subcutaneous TID WC    insulin lispro  0-3 Units Subcutaneous Nightly    [START ON 8/24/2019] pantoprazole  40 mg Oral QAM AC    vancomycin  1,000 mg Intravenous Q12H    methylPREDNISolone  40 mg Intravenous Q12H    cefepime  2 g Intravenous Q12H    lidocaine 1 % injection  5 mL Intradermal Once    mupirocin   Nasal BID    ranolazine  500 mg Oral BID    rosuvastatin  20 mg Oral QPM    sodium chloride flush  10 mL Intravenous 2 times per day    enoxaparin  40 mg Subcutaneous Daily    aspirin  81 mg Oral Nightly    clopidogrel  75 mg Oral Nightly       PRN Meds:  zolpidem, glucose, dextrose, glucagon (rDNA), dextrose, hydrOXYzine, oxyCODONE-acetaminophen, sodium chloride flush, magnesium hydroxide, ondansetron, acetaminophen    Results:  CBC:   Recent Labs     08/21/19  0858 08/22/19  0550 08/23/19  0400   WBC 11.5* 9.7 15.2*   HGB 10.6* 10.4* 10.0*   HCT 31.6* 30.8* 30.4*   MCV 86.5 87.0 87.1    284 317     BMP:   Recent Labs

## 2019-08-23 NOTE — PROGRESS NOTES
Speech Language Pathology  Facility/Department: [facility] C2 CARD TELEMETRY   CLINICAL BEDSIDE SWALLOW EVALUATION    NAME: Avis Veras  : 1970  MRN: [id number]    ADMISSION DATE: 2019  ADMITTING DIAGNOSIS: has IgA nephropathy; Anxiety; HTN (hypertension); Contusion of toe; Tobacco use disorder; Lumbar disc disease; Arthritis of shoulder region, degenerative; DDD (degenerative disc disease), lumbosacral; Chronic pain syndrome; DDD (degenerative disc disease), lumbar; Facet joint syndrome; Neuropathic pain; Insomnia; Plantar fasciitis; NSTEMI (non-ST elevated myocardial infarction) ([address] Chest pain; Coronary artery disease involving native coronary artery of native heart with unstable angina pectoris ([address] Pure hypercholesterolemia; Unstable angina ([address] Combined hyperlipidemia; S/P PTCA (percutaneous transluminal coronary angioplasty); Pneumonia due to organism; Hypoxia; Nausea vomiting and diarrhea; Acute respiratory failure with hypoxia and hypercapnia ([address] Septic shock ([address] Dysphagia; and Former smoker on their problem list.  ONSET DATE: Admitted 19    Recent Chest Xray/CT of Chest: (19)  Impression   Persistent diffuse bilateral opacity, suggestive of edema or pneumonitis. More focal opacity at the right base has decreased.       Right internal jugular central venous catheter is again seen to extend to the   expected location of right atrium.  Considering retraction several cm. Date of Eval: 2019  Evaluating Therapist: Ilia De La O    Current Diet level:  Current Diet : NPO  Current Liquid Diet : NPO    Primary Complaint  Patient Complaint: The pt and his wife report an ongoing history of significant dysphagia over the past 2 years with reportedly a > 60 lbs weight loss. The pt's wife said that the pt frequently coughs and chokes with food and liquids.  The pt confirmed this and said that at times he even has to cough up food and liquid back up into a trash can An MBS is also recommended to further assess the pharyngeal phase of the swallow and is scheduled to be completed later this afternoon. See the rest of this report for more details. Treatment Plan  Requires SLP Intervention: Yes  Duration/Frequency of Treatment: 3-5 x week for LOS  D/C Recommendations: To be determined  Referral To: GI    Recommended Diet and Intervention  Diet Solids Recommendation: Dysphagia Minced and Moist (Dysphagia II)(PENDING GI INPUT)  Liquid Consistency Recommendation: Thin(PENDING GI INPUT)  Recommended Form of Meds: Whole with puree  Recommendations: Modified barium swallow study;Assist feed;Dysphagia treatment  Therapeutic Interventions: Patient/Family education;Diet tolerance monitoring;Pharyngeal exercises; Laryngeal exercises    Compensatory Swallowing Strategies  Compensatory Swallowing Strategies: Alternate solids and liquids;Small bites/sips;Upright as possible for all oral intake;Assist feed;Remain upright for 30-45 minutes after meals;Eat/Feed slowly    Treatment/Goals  Short-term Goals  Timeframe for Short-term Goals: 1 week-8/30/19  Long-term Goals  Timeframe for Long-term Goals: 10 days- 9/2/19  Goal 1: The pt will tolerate all PO textures without dysphagia on a consistent basis  Dysphagia Goals: The patient will tolerate recommended diet without observed clinical signs of aspiration; The patient will tolerate instrumental swallowing procedure; The patient/caregiver will demonstrate understanding of compensatory strategies for improved swallowing safety. General  Chart Reviewed: Yes  Subjective  Subjective: The pt was seen with his wife present. He was pleasantly confused and needed re-direction to task frequently. Behavior/Cognition: Alert; Cooperative;Pleasant mood;Confused; Requires cueing;Distractible  Communication Observation: Functional  Follows Directions: Simple  Dentition: Edentulous(The pt reportedly has new dentures but refuses to wear them to eat with at this

## 2019-08-23 NOTE — PROGRESS NOTES
Hospitalist Progress Note      PCP: Tg Gardner MD    Date of Admission: 8/18/2019    Chief Complaint: Nausea, vomiting, diarrhea, fever and cough    Hospital Course:   55 yo male who presented to [facility] ED with complaints of nausea, vomiting and diarrhea that started earlier today, severe intensity, multiple episodes. Also complains of shortness of breath, cough with whitish sputum, subjective fevers and night sweats since the last 3 days. Pt reports he feels like he has the flu, also has body aches and decreased appetite. Diarrhea is watery, nonbloody and nonmucoid. Vomitus is nonbilious, no hematemesis. Pt does report being exposed to a sick coworker. No recent travel. Also complains of nasal congestion and headaches. Subjective: successfully extubated today. GI consulted for emesis work up. Sepsis much improved. Off pressors.     Medications:  Reviewed    Infusion Medications    dextrose       Scheduled Medications    furosemide  40 mg Intravenous Once    insulin lispro  0-6 Units Subcutaneous 6 times per day    vancomycin  1,000 mg Intravenous Q12H    methylPREDNISolone  40 mg Intravenous Q12H    cefepime  2 g Intravenous Q12H    lidocaine 1 % injection  5 mL Intradermal Once    pantoprazole  40 mg Intravenous Daily    mupirocin   Nasal BID    chlorhexidine  15 mL Mouth/Throat BID    carboxymethylcellulose PF  1 drop Both Eyes 4x Daily    ranolazine  500 mg Oral BID    rosuvastatin  20 mg Oral QPM    sodium chloride flush  10 mL Intravenous 2 times per day    enoxaparin  40 mg Subcutaneous Daily    aspirin  81 mg Oral Nightly    clopidogrel  75 mg Oral Nightly     PRN Meds: glucose, dextrose, glucagon (rDNA), dextrose, hydrOXYzine, ALPRAZolam, oxyCODONE-acetaminophen, sodium chloride flush, magnesium hydroxide, ondansetron, acetaminophen      Intake/Output Summary (Last 24 hours) at 8/23/2019 0823  Last data filed at 8/23/2019 0522  Gross per 24 hour   Intake 2684.74 ml Output 1380 ml   Net 1304.74 ml       Physical Exam Performed:    /68   Pulse 61   Temp 97.6 °F (36.4 °C) (Oral)   Resp 12   Ht 5' 8\" (1.727 m)   Wt 149 lb 4.8 oz (67.7 kg)   SpO2 96%   BMI 22.70 kg/m²     General appearance: No apparent distress, appears stated age and cooperative. HEENT: Pupils equal, round, and reactive to light. Conjunctivae/corneas clear. Neck: Supple, with full range of motion. No jugular venous distention. Trachea midline. Respiratory:  Normal respiratory effort. Clear to auscultation, bilaterally without Rales/Wheezes/Rhonchi. Cardiovascular: Regular rate and rhythm with normal S1/S2 without murmurs, rubs or gallops. Abdomen: Soft, non-tender, non-distended with normal bowel sounds. Musculoskeletal: No clubbing, cyanosis or edema bilaterally. Full range of motion without deformity. Skin: Skin color, texture, turgor normal.  No rashes or lesions. Neurologic:  Neurovascularly intact without any focal sensory/motor deficits. Cranial nerves: II-XII intact, grossly non-focal.  Psychiatric: Alert and oriented, thought content appropriate, normal insight  Capillary Refill: Brisk,< 3 seconds   Peripheral Pulses: +2 palpable, equal bilaterally     Labs:   Recent Labs     08/21/19  0858 08/22/19  0550 08/23/19  0400   WBC 11.5* 9.7 15.2*   HGB 10.6* 10.4* 10.0*   HCT 31.6* 30.8* 30.4*    284 317     Recent Labs     08/21/19  0858 08/22/19  0550 08/23/19  0400    140 141   K 4.3 4.2 4.5   CL 97* 99 100   CO2 29 30 31   BUN 13 21* 29*   CREATININE 0.9 0.8* 0.7*   CALCIUM 9.2 9.6 9.3     Recent Labs     08/21/19  0858 08/22/19  0550   AST 33 38*   ALT 22 21   BILITOT 0.7 0.4   ALKPHOS 149* 183*     No results for input(s): Sandra Stewart in the last 72 hours.     Urinalysis:      Lab Results   Component Value Date    NITRU Negative 08/18/2019    WBCUA 3-5 08/18/2019    BACTERIA Rare 08/18/2019    RBCUA 0-2 08/18/2019    BLOODU Negative 08/18/2019    SPECGRAV 1.015 08/18/2019    GLUCOSEU Negative 08/18/2019       Radiology:  XR CHEST PORTABLE   Final Result   Persistent diffuse bilateral opacity, suggestive of edema or pneumonitis. More focal opacity at the right base has decreased. Right internal jugular central venous catheter is again seen to extend to the   expected location of right atrium. Considering retraction several cm. XR CHEST PORTABLE   Final Result   Interval placement of a right internal jugular central venous catheter with   tip overlying the lower right atrium, 5 cm beyond the cavoatrial junction. Consider several cm retraction. Multifocal airspace disease with increasing consolidation in the right base. Aeration in the upper lungs is slightly improved. CT CHEST ABDOMEN PELVIS WO CONTRAST   Final Result   Extensive bilateral airspace disease could represent pulmonary edema or   multifocal pneumonia. No evidence of barium aspiration. No acute intra-abdominal abnormality, though there is streak artifact from   dense barium throughout the colon. Lucent expansile lesion in the left inferior pubic ramus which was present on   prior study from 2016 and likely represents a benign etiology such as   aneurysmal bone cyst.         XR CHEST PORTABLE   Final Result   Endotracheal tube tip in the mid trachea. Enteric tube tip in the body of the stomach. Slight interval improvement in bilateral airspace disease. XR CHEST PORTABLE   Final Result   Significant interval increase in bilateral airspace disease which could   represent multifocal pneumonia, pulmonary edema or developing ARDS. Increased size of the cardiac silhouette suspicious for pericardial effusion. FL ESOPHAGRAM   Final Result   Tiny sliding hiatal hernia, with trace esophageal reflux         XR CHEST STANDARD (2 VW)   Final Result   Bilateral interstitial lung opacities are likely infectious.            Assessment/Plan:    Active Hospital Problems    Diagnosis    Nausea vomiting and diarrhea [R11.2, R19.7]    PNA (pneumonia) [J18.9]    Hypoxia [R09.02]    Combined hyperlipidemia [E78.2]    Coronary artery disease involving native coronary artery of native heart with unstable angina pectoris ([facility] [address] [I25.110]    Tobacco use disorder [F17.200]    HTN (hypertension) [I10]       Acute hypoxic respiratory failure secondary to aspiration pneumonia, pneumonitis  - Pt presented with cough/sob/fever/leukocytosis. - Chest imaging on admission reveals infiltrates bilateral interstitial pattern. - Blood cultures are NGTD, urine legionella and pneumococcal antigens are negative, sputum culture is pending.   - supplemental O2 therapy  - SLP evaluation -- no signs of aspiration.   - esophagram unremarkable  - Intubated 8/21. Successfully extubated 8/23.  - Diffuse infiltrates likely from aspiration pneumonitis. Did not appear to be ARDS based on resolution of respiratory distress. - started IV rocephin and doxycycline (s 8/19). Abx changed to levaquin, cefepime on 8/21. Evolving Sepsis, present on admission  - 2/2 above  - developed fevers, hypotension while admitted  - currently weaned off levophed  - currently on levaquin, cefepime    Nausea, vomiting, diarrhea:  - Continue supportive care, likely viral etiology or secondary to the pneumonia itself. GI bacterial pathogens are negative. Pt is having formed stools now so C. Diff is unlikely. - esophagram unremarkable  - GI consulted for recurrent emesis and aspiration pneumonitis     Hyperlipidemia:  - Continue statin.     Coronary artery disease:   - Status post PCI to diag 1 with POBA/cutting balloon in 6/2019.  - Stable and asymptomatic; continue ranexa, aspirin, Plavix, statin.     Tobacco use:  - Quit 1 month ago.  Encouraged ongoing abstinence.      Anxiety/chronic pain:  - Continue home xanax and percocet    DVT Prophylaxis: Lovenox   Diet: DIET TUBE FEED CONTINUOUS/CYCLIC NPO; Low

## 2019-08-23 NOTE — PROGRESS NOTES
Spoke with Dr Carmen Trotter regarding Pt request to resume Ambien at HS. Verbal order received to begin 5 mg Ambien HS and d/c Xanax.

## 2019-08-23 NOTE — PROGRESS NOTES
Requested to bedside by primary RN. RT at bedside. Reports Fi02 now at 100% and still unable to maintain Sp02 > 90. Pt adequately sedated, controlled by vent and good Vt. Not being desynchronous. Purportedly was just suctioned by RT. Dr Army Corral notified. Verbal order received for STAT ABG and CXR.

## 2019-08-23 NOTE — PROCEDURES
\"sticking\" sensation in throat with PO with regurgitation; pt reported frequency of \"maybe 3 times a week\"    General Comment: Pt admitted d/t hypoxia, PNA, and vomiting and diarrhea. Pt s/p esophagram on 8/20 (see findings in EMR), bronch on 8/21, and intubated from 8/21-8/23 (extubated earlier this AM). Concern for aspiration pneumonitis per chart. Recommended Diet:  Solid consistency: Dysphagia Minced and Moist (Dysphagia II)  Liquid consistency: Moderately Thick (Honey)  Liquid administration via: Spoon;Cup(No straws)  Medication administration: Meds in puree  Referral To: GI (continued GI follow-up recommended)  *Pt is impulsive at times and would benefit from consistent cues for small, single bites/sips and use of occasional double swallow during meals. Impressions:  Treatment Dx: Dysphagia  Pt demonstrates moderate oropharyngeal dysphagia  · Pt's oral phase deficits characterized by weak lingual manipulation and reduced bolus control and cohesion with premature bolus to the pharynx with all PO trials. Pt demonstrated reduced A-P bolus transit with extremely small regular solid trial, resulting in prolonged, ineffective mastication. *Pt was edentulous for study (unclear whether or not this is pt's baseline). · Pt's pharyngeal phase deficits characterized by decreased laryngeal elevation and reduced airway/laryngeal closure with episodes of deep, silent penetration during the swallow with both thin and nectar-thick liquid trials. When cued to cough by SLP, pt unable to clear penetrated material.  When cued to take small sips, pt took very small sips of both thin and nectar-thick via cup with no penetration / aspiration observed. *However, pt is quite impulsive and typically takes large sips at baseline / noted during today's BSE.   No penetration / aspiration noted with NTL via cup and use of chin tuck strategy (*however, pt likely unable to consistently use chin tuck strategy d/t pt's behavior

## 2019-08-23 NOTE — CONSULTS
Full consult dictated    54year old male with history of HTN, HLD, CAD s/p stent, DDD, chronic pain syndrome, admitted with acute respiratory failure secondary to pneumonia. He is extubated today. He complains of chronic dysphagia and weight loss    Recommendation  1. Continue supportive care  2. Speech therapy  3. Will eventually need EGD  4.  Will follow

## 2019-08-24 ENCOUNTER — APPOINTMENT (OUTPATIENT)
Dept: GENERAL RADIOLOGY | Age: 49
DRG: 871 | End: 2019-08-24
Payer: COMMERCIAL

## 2019-08-24 LAB
GLUCOSE BLD-MCNC: 113 MG/DL (ref 70–99)
GLUCOSE BLD-MCNC: 115 MG/DL (ref 70–99)
GLUCOSE BLD-MCNC: 119 MG/DL (ref 70–99)
GLUCOSE BLD-MCNC: 127 MG/DL (ref 70–99)
PERFORMED ON: ABNORMAL

## 2019-08-24 PROCEDURE — 6360000002 HC RX W HCPCS: Performed by: NURSE PRACTITIONER

## 2019-08-24 PROCEDURE — 6370000000 HC RX 637 (ALT 250 FOR IP): Performed by: INTERNAL MEDICINE

## 2019-08-24 PROCEDURE — 2580000003 HC RX 258: Performed by: INTERNAL MEDICINE

## 2019-08-24 PROCEDURE — 6360000002 HC RX W HCPCS: Performed by: INTERNAL MEDICINE

## 2019-08-24 PROCEDURE — 1200000000 HC SEMI PRIVATE

## 2019-08-24 PROCEDURE — 99233 SBSQ HOSP IP/OBS HIGH 50: CPT | Performed by: INTERNAL MEDICINE

## 2019-08-24 PROCEDURE — 2700000000 HC OXYGEN THERAPY PER DAY

## 2019-08-24 PROCEDURE — 92526 ORAL FUNCTION THERAPY: CPT

## 2019-08-24 PROCEDURE — 87390 HIV-1 AG IA: CPT

## 2019-08-24 PROCEDURE — 6370000000 HC RX 637 (ALT 250 FOR IP): Performed by: REGISTERED NURSE

## 2019-08-24 PROCEDURE — 71046 X-RAY EXAM CHEST 2 VIEWS: CPT

## 2019-08-24 PROCEDURE — 94761 N-INVAS EAR/PLS OXIMETRY MLT: CPT

## 2019-08-24 PROCEDURE — 86702 HIV-2 ANTIBODY: CPT

## 2019-08-24 PROCEDURE — 86701 HIV-1ANTIBODY: CPT

## 2019-08-24 RX ORDER — SODIUM CHLORIDE 9 MG/ML
INJECTION, SOLUTION INTRAVENOUS
Status: DISPENSED
Start: 2019-08-24 | End: 2019-08-25

## 2019-08-24 RX ADMIN — Medication 10 ML: at 21:18

## 2019-08-24 RX ADMIN — CEFEPIME HYDROCHLORIDE 2 G: 2 INJECTION, POWDER, FOR SOLUTION INTRAVENOUS at 21:17

## 2019-08-24 RX ADMIN — ENOXAPARIN SODIUM 40 MG: 40 INJECTION SUBCUTANEOUS at 08:44

## 2019-08-24 RX ADMIN — ASPIRIN 81 MG 81 MG: 81 TABLET ORAL at 21:15

## 2019-08-24 RX ADMIN — OXYCODONE AND ACETAMINOPHEN 1 TABLET: 7.5; 325 TABLET ORAL at 21:15

## 2019-08-24 RX ADMIN — VANCOMYCIN HYDROCHLORIDE 750 MG: 10 INJECTION, POWDER, LYOPHILIZED, FOR SOLUTION INTRAVENOUS at 15:18

## 2019-08-24 RX ADMIN — VANCOMYCIN HYDROCHLORIDE 750 MG: 10 INJECTION, POWDER, LYOPHILIZED, FOR SOLUTION INTRAVENOUS at 22:45

## 2019-08-24 RX ADMIN — MUPIROCIN: 20 OINTMENT TOPICAL at 21:22

## 2019-08-24 RX ADMIN — CEFEPIME HYDROCHLORIDE 2 G: 2 INJECTION, POWDER, FOR SOLUTION INTRAVENOUS at 08:44

## 2019-08-24 RX ADMIN — ROSUVASTATIN CALCIUM 20 MG: 10 TABLET, FILM COATED ORAL at 21:15

## 2019-08-24 RX ADMIN — Medication 10 ML: at 08:45

## 2019-08-24 RX ADMIN — RANOLAZINE 500 MG: 500 TABLET, FILM COATED, EXTENDED RELEASE ORAL at 21:15

## 2019-08-24 RX ADMIN — VANCOMYCIN HYDROCHLORIDE 750 MG: 10 INJECTION, POWDER, LYOPHILIZED, FOR SOLUTION INTRAVENOUS at 06:51

## 2019-08-24 RX ADMIN — ZOLPIDEM TARTRATE 5 MG: 5 TABLET ORAL at 21:15

## 2019-08-24 RX ADMIN — RANOLAZINE 500 MG: 500 TABLET, FILM COATED, EXTENDED RELEASE ORAL at 08:44

## 2019-08-24 RX ADMIN — METHYLPREDNISOLONE SODIUM SUCCINATE 40 MG: 40 INJECTION, POWDER, FOR SOLUTION INTRAMUSCULAR; INTRAVENOUS at 18:16

## 2019-08-24 RX ADMIN — CLOPIDOGREL BISULFATE 75 MG: 75 TABLET ORAL at 21:15

## 2019-08-24 RX ADMIN — PANTOPRAZOLE SODIUM 40 MG: 40 TABLET, DELAYED RELEASE ORAL at 06:34

## 2019-08-24 RX ADMIN — METHYLPREDNISOLONE SODIUM SUCCINATE 40 MG: 40 INJECTION, POWDER, FOR SOLUTION INTRAMUSCULAR; INTRAVENOUS at 06:34

## 2019-08-24 RX ADMIN — MUPIROCIN: 20 OINTMENT TOPICAL at 08:46

## 2019-08-24 RX ADMIN — OXYCODONE AND ACETAMINOPHEN 1 TABLET: 7.5; 325 TABLET ORAL at 08:44

## 2019-08-24 ASSESSMENT — PAIN SCALES - GENERAL
PAINLEVEL_OUTOF10: 8
PAINLEVEL_OUTOF10: 0
PAINLEVEL_OUTOF10: 0
PAINLEVEL_OUTOF10: 10
PAINLEVEL_OUTOF10: 10
PAINLEVEL_OUTOF10: 0

## 2019-08-24 ASSESSMENT — PAIN DESCRIPTION - DESCRIPTORS
DESCRIPTORS: ACHING
DESCRIPTORS: ACHING

## 2019-08-24 ASSESSMENT — PAIN DESCRIPTION - ORIENTATION
ORIENTATION: LOWER
ORIENTATION: LOWER

## 2019-08-24 ASSESSMENT — PAIN DESCRIPTION - ONSET
ONSET: PROGRESSIVE
ONSET: ON-GOING

## 2019-08-24 ASSESSMENT — PAIN DESCRIPTION - LOCATION
LOCATION: BACK
LOCATION: BACK

## 2019-08-24 ASSESSMENT — PAIN DESCRIPTION - PAIN TYPE
TYPE: CHRONIC PAIN
TYPE: CHRONIC PAIN

## 2019-08-24 ASSESSMENT — PAIN DESCRIPTION - FREQUENCY
FREQUENCY: CONTINUOUS
FREQUENCY: CONTINUOUS

## 2019-08-24 NOTE — CONSULTS
No sputum production. No  hemoptysis. GI:  As per HPI. :  No dysuria, hematuria, urinary  hesitancy, or frequency. SKIN:  No jaundice or rash. PAST MEDICAL HISTORY:  Hypertension, hyperlipidemia, coronary artery  disease, status post stent, degenerative disk disease, chronic back  pain, chronic pain syndrome, IgA nephropathy, anxiety. PAST SURGICAL HISTORY:  Coronary angioplasty, dental surgery. CURRENT MEDICATIONS AT HOME:  Xanax, Percocet, Crestor, Ambien, Ranexa,  aspirin, Nitrostat. ALLERGIES:  PENICILLIN. SOCIAL HISTORY:  Former smoker, had 10-year pack history. Denies  alcohol and drug use. FAMILY HISTORY:  Negative for GI malignancy. PHYSICAL EXAMINATION:  VITAL SIGNS:  98.2, pulse is 78, respirations 19, blood pressure 120/79,  saturations are 93%. GENERAL:  He is an alert, awake, oriented x3, well-developed,  well-nourished male who appears to be in no apparent distress. HEENT:  NC/AT, MAURIZIO, dry mucous membranes. NECK:  Supple. No thyromegaly. No cervical lymphadenopathy. No JVD. HEART:  Regular rate and rhythm without murmurs, gallops, rubs. LUNGS:  Clear to auscultation. ABDOMEN:  Soft, nondistended, nontender. Bowel sounds are present. EXTREMITIES:  No clubbing, cyanosis, or edema. LABORATORY DATA:  White blood cell count 15.2, hemoglobin 10, hematocrit  30.4, platelets 203. Sodium 141, potassium 4.5, chloride 100, bicarb  31, BUN 29, creatinine 0.9, glucose 174. IMAGING:  CT scan of the chest, abdomen, and pelvis:  1. Extensive bilateral airspace disease, represent pulmonary edema,  multifocal pneumonia. No evidence of barium aspiration. 2.  No acute intraabdominal abnormality. IMPRESSION:  This is a 72-year-old male with history of hypertension,  hyperlipidemia, coronary artery disease, status post stent, degenerative  disk disease, chronic pain syndrome, anxiety and IgA nephropathy,  admitted with acute respiratory failure secondary to pneumonia. He  complains of chronic dysphagia associated with 50-pound weight loss in  the past two years. Differential should include _____ oropharyngeal  dysphagia, particularly given the recent mechanical ventilation as well  as aspiration pneumonia. RECOMMENDATIONS:  1. Continue supportive care. 2.  Speech Therapy to follow the patient. 3.  The patient will eventually need EGD once medically cleared. 4.  We will follow the patient with Anesthesia. 5.  We will follow the patient with you. 6.  In the meantime, start pantoprazole daily. 7.  Advance diet per Speech recommendations. Follow Speech Therapy's  recommendations.         Amaury Guevara MD    D: 08/23/2019 18:25:01       T: 08/24/2019 0:13:48     [email or web address]  Job#: [id number]     Doc#: [id number]    CC:  Charna Schirmer, MD Jackquelyn Barban, MD

## 2019-08-24 NOTE — PROGRESS NOTES
Speech Language Pathology  Facility/Department: [facility] C2 CARD TELEMETRY  Dysphagia Daily Treatment Note    NAME: Bhanu Mckeon  : 1970  MRN: [id number]    Patient Diagnosis(es):   Patient Active Problem List    Diagnosis Date Noted    [facility] failure with hypoxia and hypercapnia [facility]     Septic shock ([facility]     Dysphagia     Former smoker     Nausea vomiting and diarrhea 2019    Pneumonia due to organism 2019    Hypoxia 2019    S/P PTCA (percutaneous transluminal coronary angioplasty) 2019    Unstable angina ([facility]     Combined hyperlipidemia     Coronary artery disease involving native coronary artery of native heart with unstable angina pectoris ([facility]     Pure hypercholesterolemia     NSTEMI (non-ST elevated myocardial infarction) ([facility] 10/13/2018    Chest pain 10/13/2018    Plantar fasciitis 2014    Insomnia     Chronic pain syndrome     DDD (degenerative disc disease), lumbar     Facet joint syndrome     Neuropathic pain     DDD (degenerative disc disease), lumbosacral 2012    Arthritis of shoulder region, degenerative 2011    Lumbar disc disease 2011    Tobacco use disorder 2011    IgA nephropathy 2010    Anxiety 2010    HTN (hypertension) 2010    Contusion of toe 2010     Allergies: Allergies   Allergen Reactions    Penicillins      Subjective: Pt seen upright in bed, alert and agreeable to therapy, RN Ok'd SLP entry and therapy. Pt voiced dislike of thickened liquids. Pain: No c/o pain    Current Diet: DIET DYSPHAGIA MINCED AND MOIST; Moderately Thick (Honey); No Drinking Straw    Diet Tolerance:  Patient tolerating current diet level without signs/symptoms of penetration / aspiration per chart. P.O. Trials:   Thin       Nectar / Mildly Thick       Honey / Moderately Thick   x x3 sips via cup   Pudding / Extremely Thick       Puree       Solid         Dysphagia Treatment

## 2019-08-24 NOTE — PROGRESS NOTES
flush 0.9 % injection 10 mL  10 mL Intravenous 2 times per day Kel Castellanos MD   10 mL at 08/24/19 0845    sodium chloride flush 0.9 % injection 10 mL  10 mL Intravenous PRN Kel Castellanos MD        magnesium hydroxide (MILK OF MAGNESIA) 400 MG/5ML suspension 30 mL  30 mL Oral Daily PRN Kel Castellanos MD        [facility] injection 4 mg  4 mg Intravenous Q6H PRN Kel Castellanos MD   4 mg at 08/20/19 1016    enoxaparin (LOVENOX) injection 40 mg  40 mg Subcutaneous Daily Kel Castellanos MD   40 mg at 08/24/19 0844    acetaminophen (TYLENOL) tablet 650 mg  650 mg Oral Q4H PRN Kel Castellanos MD   650 mg at 08/21/19 2102    aspirin chewable tablet 81 mg  81 mg Oral Nightly Kel Castellanos MD   81 mg at 08/23/19 2306    clopidogrel (PLAVIX) tablet 75 mg  75 mg Oral Nightly Kel Castellanos MD   75 mg at 08/23/19 2305     Allergies   Allergen Reactions    Penicillins      Active Problems:    HTN (hypertension)    Tobacco use disorder    Coronary artery disease involving native coronary artery of native heart with unstable angina pectoris ([address]    Combined hyperlipidemia    Pneumonia due to organism    Hypoxia    Nausea vomiting and diarrhea    Acute respiratory failure with hypoxia and hypercapnia ([address]    Septic shock ([address]    Dysphagia    Former smoker  Resolved Problems:    * No resolved hospital problems. *    Blood pressure 126/78, pulse 65, temperature 98.1 °F (36.7 °C), temperature source Core, resp. rate 18, height 5' 8\" (1.727 m), weight 149 lb 4.8 oz (67.7 kg), SpO2 97 %. Subjective:  Symptoms:  Stable. Pain:  He reports no pain. Objective:  Vital signs: (most recent): Blood pressure 126/78, pulse 65, temperature 98.1 °F (36.7 °C), temperature source Core, resp. rate 18, height 5' 8\" (1.727 m), weight 149 lb 4.8 oz (67.7 kg), SpO2 97 %. Heart: Normal rate. Regular rhythm. S1 normal and S2 normal.    Abdomen: Abdomen is soft.   Bowel

## 2019-08-24 NOTE — PROGRESS NOTES
Wt 149 lb 4.8 oz (67.7 kg)   SpO2 92%   BMI 22.70 kg/m²          Intake/Output Summary (Last 24 hours) at 8/23/2019 2346  Last data filed at 8/23/2019 2318  Gross per 24 hour   Intake 1167 ml   Output 3555 ml   Net -2388 ml       EXAM:  General: No distress. Somewhat disheveled appearing, appears underweight. Eyes: PERRL. No sclera icterus. No conjunctival injection. ENT: Class II airway, edentulous  Neck: No JVD. RIJ CVC. Resp: No accessory muscle use. No crackles. No wheezing. No rhonchi. CV: Regular rate. Regular rhythm. No mumur or rub. No edema. GI: Deferred. Skin: Warm and dry. No nodule on exposed extremities. No rash on exposed extremities. Lymph: Deferred. M/S: No cyanosis. No joint deformity. No clubbing. Neuro: Awake and oriented, communicative. No obvious focal deficit. Psych: No evidence of agitation or depression.        Medications:  Scheduled Meds:   insulin lispro  0-6 Units Subcutaneous TID WC    insulin lispro  0-3 Units Subcutaneous Nightly    [START ON 8/24/2019] pantoprazole  40 mg Oral QAM AC    vancomycin  750 mg Intravenous Q8H    methylPREDNISolone  40 mg Intravenous Q12H    cefepime  2 g Intravenous Q12H    lidocaine 1 % injection  5 mL Intradermal Once    mupirocin   Nasal BID    ranolazine  500 mg Oral BID    rosuvastatin  20 mg Oral QPM    sodium chloride flush  10 mL Intravenous 2 times per day    enoxaparin  40 mg Subcutaneous Daily    aspirin  81 mg Oral Nightly    clopidogrel  75 mg Oral Nightly       PRN Meds:  zolpidem, glucose, dextrose, glucagon (rDNA), dextrose, hydrOXYzine, oxyCODONE-acetaminophen, sodium chloride flush, magnesium hydroxide, ondansetron, acetaminophen    Results:  CBC:   Recent Labs     08/21/19  0858 08/22/19  0550 08/23/19  0400   WBC 11.5* 9.7 15.2*   HGB 10.6* 10.4* 10.0*   HCT 31.6* 30.8* 30.4*   MCV 86.5 87.0 87.1    284 317     BMP:   Recent Labs     08/21/19  0858 08/22/19  0550 08/23/19  0400    140 141 K 4.3 4.2 4.5   CL 97* 99 100   CO2 29 30 31   BUN 13 21* 29*   CREATININE 0.9 0.8* 0.7*     LIVER PROFILE:   Recent Labs     08/21/19  0858 08/22/19  0550   AST 33 38*   ALT 22 21   BILITOT 0.7 0.4   ALKPHOS 149* 183*       Cultures:  Negative so far    Films:  CXR reviewed by me      Assessment and plan:  Acute respiratory failure, hypoxemic and hypercarbic/ARDS. In spite of his chest x-ray showing extensive bilateral airspace disease, he was oxygenating well, extubated 8/23, saturating adequately on oxygen at 4 LPM.  Titrate to SaO2 >90%. Septic shock. Weaned off vasopressors  Pneumonia. Sputum culture with gram-positive cocci, continue broad-spectrum antibiotic for now with vancomycin and cefepime. On empiric steroid for severe pneumonia. Probably de-escalate by tomorrow. Leukocytosis. Counts improved, increased again. Repeat in a.m. Anemia, normochromic and normocytic. Unclear etiology, work-up as an outpatient. Stable. Dysphagia. Reportedly he has had evaluation including upper endoscopy. His wife mentions that his dysphagia is possibly worse since getting his teeth extracted, but was present prior to this. Reviewed recommendations from speech and swallow. Hyperglycemia. Follow, sliding scale. Per IM. CAD, hyperlipidemia. Continue Plavix, Ranexa, rosuvastatin. Per IM. DVT prophylaxis. Lovenox    Discussed with patient, his wife and nursing. Remove Gomez, can be moved out of ICU.         Electronically signed by:  Gamal Rosales MD    8/23/2019    11:46 PM.

## 2019-08-25 ENCOUNTER — APPOINTMENT (OUTPATIENT)
Dept: GENERAL RADIOLOGY | Age: 49
DRG: 871 | End: 2019-08-25
Payer: COMMERCIAL

## 2019-08-25 LAB
ANION GAP SERPL CALCULATED.3IONS-SCNC: 10 MMOL/L (ref 3–16)
BUN BLDV-MCNC: 31 MG/DL (ref 7–20)
CALCIUM SERPL-MCNC: 9.5 MG/DL (ref 8.3–10.6)
CHLORIDE BLD-SCNC: 94 MMOL/L (ref 99–110)
CO2: 31 MMOL/L (ref 21–32)
CREAT SERPL-MCNC: 0.7 MG/DL (ref 0.9–1.3)
GFR AFRICAN AMERICAN: >60
GFR NON-AFRICAN AMERICAN: >60
GLUCOSE BLD-MCNC: 111 MG/DL (ref 70–99)
GLUCOSE BLD-MCNC: 113 MG/DL (ref 70–99)
GLUCOSE BLD-MCNC: 122 MG/DL (ref 70–99)
GLUCOSE BLD-MCNC: 135 MG/DL (ref 70–99)
HIV AG/AB: NORMAL
HIV ANTIGEN: NORMAL
HIV-1 ANTIBODY: NORMAL
HIV-2 AB: NORMAL
PERFORMED ON: ABNORMAL
POTASSIUM REFLEX MAGNESIUM: 4.8 MMOL/L (ref 3.5–5.1)
SODIUM BLD-SCNC: 135 MMOL/L (ref 136–145)
VANCOMYCIN TROUGH: 15.9 UG/ML (ref 10–20)

## 2019-08-25 PROCEDURE — 2580000003 HC RX 258: Performed by: INTERNAL MEDICINE

## 2019-08-25 PROCEDURE — 6370000000 HC RX 637 (ALT 250 FOR IP): Performed by: INTERNAL MEDICINE

## 2019-08-25 PROCEDURE — 6360000002 HC RX W HCPCS: Performed by: INTERNAL MEDICINE

## 2019-08-25 PROCEDURE — 94761 N-INVAS EAR/PLS OXIMETRY MLT: CPT

## 2019-08-25 PROCEDURE — 71046 X-RAY EXAM CHEST 2 VIEWS: CPT

## 2019-08-25 PROCEDURE — 36592 COLLECT BLOOD FROM PICC: CPT

## 2019-08-25 PROCEDURE — 80048 BASIC METABOLIC PNL TOTAL CA: CPT

## 2019-08-25 PROCEDURE — 2700000000 HC OXYGEN THERAPY PER DAY

## 2019-08-25 PROCEDURE — 80202 ASSAY OF VANCOMYCIN: CPT

## 2019-08-25 PROCEDURE — 99232 SBSQ HOSP IP/OBS MODERATE 35: CPT | Performed by: INTERNAL MEDICINE

## 2019-08-25 PROCEDURE — 1200000000 HC SEMI PRIVATE

## 2019-08-25 RX ADMIN — RANOLAZINE 500 MG: 500 TABLET, FILM COATED, EXTENDED RELEASE ORAL at 08:40

## 2019-08-25 RX ADMIN — VANCOMYCIN HYDROCHLORIDE 750 MG: 10 INJECTION, POWDER, LYOPHILIZED, FOR SOLUTION INTRAVENOUS at 06:03

## 2019-08-25 RX ADMIN — OXYCODONE AND ACETAMINOPHEN 1 TABLET: 7.5; 325 TABLET ORAL at 20:52

## 2019-08-25 RX ADMIN — NYSTATIN 500000 UNITS: 100000 SUSPENSION ORAL at 17:19

## 2019-08-25 RX ADMIN — RANOLAZINE 500 MG: 500 TABLET, FILM COATED, EXTENDED RELEASE ORAL at 20:52

## 2019-08-25 RX ADMIN — VANCOMYCIN HYDROCHLORIDE 750 MG: 10 INJECTION, POWDER, LYOPHILIZED, FOR SOLUTION INTRAVENOUS at 14:35

## 2019-08-25 RX ADMIN — CEFEPIME HYDROCHLORIDE 2 G: 2 INJECTION, POWDER, FOR SOLUTION INTRAVENOUS at 21:31

## 2019-08-25 RX ADMIN — CEFEPIME HYDROCHLORIDE 2 G: 2 INJECTION, POWDER, FOR SOLUTION INTRAVENOUS at 08:40

## 2019-08-25 RX ADMIN — ZOLPIDEM TARTRATE 5 MG: 5 TABLET ORAL at 20:52

## 2019-08-25 RX ADMIN — OXYCODONE AND ACETAMINOPHEN 1 TABLET: 7.5; 325 TABLET ORAL at 06:12

## 2019-08-25 RX ADMIN — PANTOPRAZOLE SODIUM 40 MG: 40 TABLET, DELAYED RELEASE ORAL at 06:04

## 2019-08-25 RX ADMIN — Medication 10 ML: at 20:52

## 2019-08-25 RX ADMIN — VANCOMYCIN HYDROCHLORIDE 750 MG: 10 INJECTION, POWDER, LYOPHILIZED, FOR SOLUTION INTRAVENOUS at 22:50

## 2019-08-25 RX ADMIN — Medication 10 ML: at 08:41

## 2019-08-25 RX ADMIN — Medication 10 ML: at 14:35

## 2019-08-25 RX ADMIN — ROSUVASTATIN CALCIUM 20 MG: 10 TABLET, FILM COATED ORAL at 22:11

## 2019-08-25 RX ADMIN — NYSTATIN 500000 UNITS: 100000 SUSPENSION ORAL at 20:52

## 2019-08-25 RX ADMIN — PREDNISONE 30 MG: 10 TABLET ORAL at 12:01

## 2019-08-25 RX ADMIN — NYSTATIN 500000 UNITS: 100000 SUSPENSION ORAL at 12:41

## 2019-08-25 RX ADMIN — METHYLPREDNISOLONE SODIUM SUCCINATE 40 MG: 40 INJECTION, POWDER, FOR SOLUTION INTRAMUSCULAR; INTRAVENOUS at 06:04

## 2019-08-25 RX ADMIN — ASPIRIN 81 MG 81 MG: 81 TABLET ORAL at 20:52

## 2019-08-25 RX ADMIN — ENOXAPARIN SODIUM 40 MG: 40 INJECTION SUBCUTANEOUS at 08:40

## 2019-08-25 RX ADMIN — CLOPIDOGREL BISULFATE 75 MG: 75 TABLET ORAL at 20:52

## 2019-08-25 RX ADMIN — OXYCODONE AND ACETAMINOPHEN 1 TABLET: 7.5; 325 TABLET ORAL at 12:52

## 2019-08-25 ASSESSMENT — PAIN SCALES - GENERAL
PAINLEVEL_OUTOF10: 0
PAINLEVEL_OUTOF10: 8
PAINLEVEL_OUTOF10: 0
PAINLEVEL_OUTOF10: 8
PAINLEVEL_OUTOF10: 0
PAINLEVEL_OUTOF10: 8
PAINLEVEL_OUTOF10: 0

## 2019-08-25 NOTE — PROGRESS NOTES
Hospitalist Progress Note      PCP: Merlin Coins, MD    Date of Admission: 8/18/2019    Chief Complaint: Nausea, vomiting, diarrhea, fever and cough    Hospital Course:   55 yo male who presented to [facility] ED with complaints of nausea, vomiting and diarrhea that started earlier today, severe intensity, multiple episodes. Also complains of shortness of breath, cough with whitish sputum, subjective fevers and night sweats since the last 3 days. Pt reports he feels like he has the flu, also has body aches and decreased appetite. Diarrhea is watery, nonbloody and nonmucoid. Vomitus is nonbilious, no hematemesis. Pt does report being exposed to a sick coworker. No recent travel. Also complains of nasal congestion and headaches. Subjective: Improved respiratory status, stable off O2. Tolerating Dysphagia diet. Minimal appetite.      Medications:  Reviewed    Infusion Medications    dextrose       Scheduled Medications    predniSONE  30 mg Oral Daily    nystatin  5 mL Oral 4x Daily    insulin lispro  0-6 Units Subcutaneous TID WC    insulin lispro  0-3 Units Subcutaneous Nightly    pantoprazole  40 mg Oral QAM AC    vancomycin  750 mg Intravenous Q8H    cefepime  2 g Intravenous Q12H    lidocaine 1 % injection  5 mL Intradermal Once    mupirocin   Nasal BID    ranolazine  500 mg Oral BID    rosuvastatin  20 mg Oral QPM    sodium chloride flush  10 mL Intravenous 2 times per day    enoxaparin  40 mg Subcutaneous Daily    aspirin  81 mg Oral Nightly    clopidogrel  75 mg Oral Nightly     PRN Meds: zolpidem, glucose, dextrose, glucagon (rDNA), dextrose, hydrOXYzine, oxyCODONE-acetaminophen, sodium chloride flush, magnesium hydroxide, ondansetron, acetaminophen      Intake/Output Summary (Last 24 hours) at 8/25/2019 1308  Last data filed at 8/24/2019 2200  Gross per 24 hour   Intake 407 ml   Output 150 ml   Net 257 ml       Physical Exam Performed:    /73   Pulse 56   Temp 97.8 °F (36.6 °C) (Axillary)   Resp 16   Ht 5' 8\" (1.727 m)   Wt 149 lb 4.8 oz (67.7 kg)   SpO2 92%   BMI 22.70 kg/m²     General appearance: No apparent distress, appears stated age and cooperative. HEENT: Pupils equal, round, and reactive to light. Conjunctivae/corneas clear. Neck: Supple, with full range of motion. No jugular venous distention. Trachea midline. Respiratory:  Normal respiratory effort. Clear to auscultation, bilaterally without Rales/Wheezes/Rhonchi. Cardiovascular: Regular rate and rhythm with normal S1/S2 without murmurs, rubs or gallops. Abdomen: Soft, non-tender, non-distended with normal bowel sounds. Musculoskeletal: No clubbing, cyanosis or edema bilaterally. Full range of motion without deformity. Skin: Skin color, texture, turgor normal.  No rashes or lesions. Neurologic:  Neurovascularly intact without any focal sensory/motor deficits. Cranial nerves: II-XII intact, grossly non-focal.  Psychiatric: Alert and oriented, thought content appropriate, normal insight  Capillary Refill: Brisk,< 3 seconds   Peripheral Pulses: +2 palpable, equal bilaterally     Labs:   Recent Labs     08/23/19  0400   WBC 15.2*   HGB 10.0*   HCT 30.4*        Recent Labs     08/23/19  0400 08/25/19  0515    135*   K 4.5 4.8    94*   CO2 31 31   BUN 29* 31*   CREATININE 0.7* 0.7*   CALCIUM 9.3 9.5     No results for input(s): AST, ALT, BILIDIR, BILITOT, ALKPHOS in the last 72 hours. No results for input(s): Cordell Tomer in the last 72 hours. Urinalysis:      Lab Results   Component Value Date    NITRU Negative 08/18/2019    WBCUA 3-5 08/18/2019    BACTERIA Rare 08/18/2019    RBCUA 0-2 08/18/2019    BLOODU Negative 08/18/2019    SPECGRAV 1.015 08/18/2019    GLUCOSEU Negative 08/18/2019       Radiology:  XR CHEST STANDARD (2 VW)   Final Result   Stable appearance of the chest with mild bibasilar ground-glass opacities.          XR CHEST STANDARD (2 VW)   Final Result   Interval extubation with improved aeration in the lungs. FL MODIFIED BARIUM SWALLOW W VIDEO   Final Result   Laryngeal penetration with thin liquid and with nectar consistency in the   neutral position. Please see separate speech pathology report for full discussion of findings   and recommendations. XR CHEST PORTABLE   Final Result   Persistent diffuse bilateral opacity, suggestive of edema or pneumonitis. More focal opacity at the right base has decreased. Right internal jugular central venous catheter is again seen to extend to the   expected location of right atrium. Considering retraction several cm. XR CHEST PORTABLE   Final Result   Interval placement of a right internal jugular central venous catheter with   tip overlying the lower right atrium, 5 cm beyond the cavoatrial junction. Consider several cm retraction. Multifocal airspace disease with increasing consolidation in the right base. Aeration in the upper lungs is slightly improved. CT CHEST ABDOMEN PELVIS WO CONTRAST   Final Result   Extensive bilateral airspace disease could represent pulmonary edema or   multifocal pneumonia. No evidence of barium aspiration. No acute intra-abdominal abnormality, though there is streak artifact from   dense barium throughout the colon. Lucent expansile lesion in the left inferior pubic ramus which was present on   prior study from 2016 and likely represents a benign etiology such as   aneurysmal bone cyst.         XR CHEST PORTABLE   Final Result   Endotracheal tube tip in the mid trachea. Enteric tube tip in the body of the stomach. Slight interval improvement in bilateral airspace disease. XR CHEST PORTABLE   Final Result   Significant interval increase in bilateral airspace disease which could   represent multifocal pneumonia, pulmonary edema or developing ARDS. Increased size of the cardiac silhouette suspicious for pericardial effusion.

## 2019-08-25 NOTE — PROGRESS NOTES
Assessment complete. VSS. Denies pain. Pt had BM this morning, per pt first BM in 6 days. Ambulated to BR independently. Call light in reach.

## 2019-08-25 NOTE — PROGRESS NOTES
tenderness. Assessment & Plan  54year old male with history of HTN, HLD, CAD s/p stent, DDD, chronic pain syndrome, admitted with acute respiratory failure secondary to pneumonia. He is extubated. He complains of chronic dysphagia to solids, transient impactions and weight loss     Recommendation  1. Continue supportive care  2. Will need EGD/dil soon, when can be off Plavix for a few days  3. OK to D/C from GI standpoint  4.  Will follow     Devon Avalos MD       (O) [phone]  Devon Avalos MD  8/25/2019

## 2019-08-25 NOTE — PROGRESS NOTES
Pulmonary & Critical Care Medicine ICU Progress Note    62-year-old male admitted 8/18/19 with nausea, vomiting, diarrhea, cough, shortness of breath and congestion, subjective fever and sweats. Treated for pneumonia, progressed to develop acute respiratory failure, failed 100% nonrebreather oxygen and was intubated and placed on mechanical ventilation 8/21/2019. History obtained from the patient's wife at bedside. It appears he had a 20-pack-year smoking history, has quit drinking alcohol about 1 year ago. He works part-time in [employer], worked until recently. His wife had a upper respiratory tract infection and lost her voice, there was no other sick contact. The patient was admitted with nausea, vomiting, she says he had a lot of bowel movements. He had also been having cough and fever. He has had difficulty swallowing, coughs and chokes with solids and liquids. He has gone downhill since his CABG in about 2003. He has been a borderline diabetic. He was coughing up slightly yellowish phlegm. There is no exposure to birds, feather or down comforter or pillows. Events of Last 24 hours: The patient has remained hemodynamically stable and afebrile, was extubated 8/23/2019. On oxygen at 1 LPM with SaO2 95%. Denies shortness of breath, sitting up in bed. Has minimal cough, clear phlegm. He underwent swallow evaluation, recommendations reviewed. His clinical picture appeared to be suggestive of ARDS, but he was able to get extubated rapidly. He had low-grade fever, has remained afebrile.      Invasive Lines:   CVC 8/21/19    Art Line         MV:  8/21/2019, extubated 8/23/2019      Recent Labs     08/23/19  0400   PHART 7.412   [id number] 48.0*   PO2ART 76.4       MV Settings:  Vent Mode: CPAP Rate Set: 16 bmp/Vt Ordered: 450 mL/ [email or web address])    IV:     dextrose         Vitals:  BP (!) 127/94   Pulse 50   Temp 97.5 °F (36.4 °C) (Oral)   Resp 16   Ht 5' 8\" (1.727 m)   Wt 149 lb 4.8 oz (67.7 kg)   SpO2 95%   BMI 22.70 kg/m²          Intake/Output Summary (Last 24 hours) at 8/25/2019 1054  Last data filed at 8/24/2019 2200  Gross per 24 hour   Intake 647 ml   Output 425 ml   Net 222 ml       EXAM:  General: No distress. Somewhat disheveled appearing, appears underweight. Eyes: PERRL. No sclera icterus. No conjunctival injection. ENT: Class II airway, edentulous. Oral thrush. Neck: No JVD. RIJ CVC. Resp: No accessory muscle use. No crackles. No wheezing. No rhonchi. CV: Regular rate. Regular rhythm. No mumur or rub. No edema. GI: Deferred. Skin: Warm and dry. No nodule on exposed extremities. No rash on exposed extremities. Lymph: Deferred. M/S: No cyanosis. No joint deformity. No clubbing. Neuro: Awake and oriented, communicative. No obvious focal deficit. Psych: No evidence of agitation or depression.        Medications:  Scheduled Meds:   insulin lispro  0-6 Units Subcutaneous TID WC    insulin lispro  0-3 Units Subcutaneous Nightly    pantoprazole  40 mg Oral QAM AC    vancomycin  750 mg Intravenous Q8H    methylPREDNISolone  40 mg Intravenous Q12H    cefepime  2 g Intravenous Q12H    lidocaine 1 % injection  5 mL Intradermal Once    mupirocin   Nasal BID    ranolazine  500 mg Oral BID    rosuvastatin  20 mg Oral QPM    sodium chloride flush  10 mL Intravenous 2 times per day    enoxaparin  40 mg Subcutaneous Daily    aspirin  81 mg Oral Nightly    clopidogrel  75 mg Oral Nightly       PRN Meds:  zolpidem, glucose, dextrose, glucagon (rDNA), dextrose, hydrOXYzine, oxyCODONE-acetaminophen, sodium chloride flush, magnesium hydroxide, ondansetron, acetaminophen    Results:  CBC:   Recent Labs     08/23/19  0400   WBC 15.2*   HGB 10.0*   HCT 30.4*   MCV 87.1        BMP:   Recent Labs     08/23/19  0400 08/25/19  0515    135*   K 4.5 4.8    94*   CO2 31 31   BUN 29* 31*   CREATININE 0.7* 0.7*       Cultures:  Negative so far    Films:  CXR

## 2019-08-26 ENCOUNTER — TELEPHONE (OUTPATIENT)
Dept: PULMONOLOGY | Age: 49
End: 2019-08-26

## 2019-08-26 VITALS
WEIGHT: 149.3 LBS | BODY MASS INDEX: 22.63 KG/M2 | HEART RATE: 55 BPM | DIASTOLIC BLOOD PRESSURE: 75 MMHG | TEMPERATURE: 98.1 F | SYSTOLIC BLOOD PRESSURE: 124 MMHG | RESPIRATION RATE: 18 BRPM | OXYGEN SATURATION: 93 % | HEIGHT: 68 IN

## 2019-08-26 LAB
ANION GAP SERPL CALCULATED.3IONS-SCNC: 10 MMOL/L (ref 3–16)
BLOOD CULTURE, ROUTINE: NORMAL
BUN BLDV-MCNC: 29 MG/DL (ref 7–20)
CALCIUM SERPL-MCNC: 9.5 MG/DL (ref 8.3–10.6)
CHLORIDE BLD-SCNC: 96 MMOL/L (ref 99–110)
CO2: 30 MMOL/L (ref 21–32)
CREAT SERPL-MCNC: 0.8 MG/DL (ref 0.9–1.3)
GFR AFRICAN AMERICAN: >60
GFR NON-AFRICAN AMERICAN: >60
GLUCOSE BLD-MCNC: 101 MG/DL (ref 70–99)
GLUCOSE BLD-MCNC: 107 MG/DL (ref 70–99)
GLUCOSE BLD-MCNC: 88 MG/DL (ref 70–99)
Lab: NORMAL
PERFORMED ON: ABNORMAL
PERFORMED ON: ABNORMAL
POTASSIUM REFLEX MAGNESIUM: 4.5 MMOL/L (ref 3.5–5.1)
REPORT: NORMAL
SODIUM BLD-SCNC: 136 MMOL/L (ref 136–145)
THIS TEST SENT TO: NORMAL

## 2019-08-26 PROCEDURE — 2580000003 HC RX 258: Performed by: INTERNAL MEDICINE

## 2019-08-26 PROCEDURE — 99232 SBSQ HOSP IP/OBS MODERATE 35: CPT | Performed by: INTERNAL MEDICINE

## 2019-08-26 PROCEDURE — 6360000002 HC RX W HCPCS: Performed by: INTERNAL MEDICINE

## 2019-08-26 PROCEDURE — 36415 COLL VENOUS BLD VENIPUNCTURE: CPT

## 2019-08-26 PROCEDURE — 6370000000 HC RX 637 (ALT 250 FOR IP): Performed by: INTERNAL MEDICINE

## 2019-08-26 PROCEDURE — 80048 BASIC METABOLIC PNL TOTAL CA: CPT

## 2019-08-26 RX ADMIN — ENOXAPARIN SODIUM 40 MG: 40 INJECTION SUBCUTANEOUS at 09:23

## 2019-08-26 RX ADMIN — OXYCODONE AND ACETAMINOPHEN 1 TABLET: 7.5; 325 TABLET ORAL at 12:46

## 2019-08-26 RX ADMIN — CEFEPIME HYDROCHLORIDE 2 G: 2 INJECTION, POWDER, FOR SOLUTION INTRAVENOUS at 09:23

## 2019-08-26 RX ADMIN — Medication 10 ML: at 09:24

## 2019-08-26 RX ADMIN — PREDNISONE 30 MG: 10 TABLET ORAL at 09:23

## 2019-08-26 RX ADMIN — NYSTATIN 500000 UNITS: 100000 SUSPENSION ORAL at 12:46

## 2019-08-26 RX ADMIN — NYSTATIN 500000 UNITS: 100000 SUSPENSION ORAL at 09:23

## 2019-08-26 RX ADMIN — VANCOMYCIN HYDROCHLORIDE 750 MG: 10 INJECTION, POWDER, LYOPHILIZED, FOR SOLUTION INTRAVENOUS at 06:33

## 2019-08-26 RX ADMIN — PANTOPRAZOLE SODIUM 40 MG: 40 TABLET, DELAYED RELEASE ORAL at 06:29

## 2019-08-26 RX ADMIN — RANOLAZINE 500 MG: 500 TABLET, FILM COATED, EXTENDED RELEASE ORAL at 09:24

## 2019-08-26 RX ADMIN — OXYCODONE AND ACETAMINOPHEN 1 TABLET: 7.5; 325 TABLET ORAL at 06:29

## 2019-08-26 ASSESSMENT — PAIN SCALES - GENERAL
PAINLEVEL_OUTOF10: 9
PAINLEVEL_OUTOF10: 0
PAINLEVEL_OUTOF10: 9
PAINLEVEL_OUTOF10: 0

## 2019-08-26 ASSESSMENT — PAIN DESCRIPTION - ORIENTATION: ORIENTATION: LOWER

## 2019-08-26 ASSESSMENT — PAIN DESCRIPTION - LOCATION: LOCATION: BACK

## 2019-08-26 ASSESSMENT — PAIN DESCRIPTION - PAIN TYPE: TYPE: CHRONIC PAIN

## 2019-08-26 NOTE — DISCHARGE SUMMARY
appear to be ARDS based on resolution of respiratory distress. - Intubated 8/21. Successfully extubated 8/23. - Improving respiratory status, weaned off O2  - started IV rocephin and doxycycline (s 8/19). Abx changed to levaquin, cefepime on 8/21; Completed course of Abx.   - Completed course of steroids.   - SLP eval - dysphagia/HTL. Evolving Sepsis, present on admission  - 2/2 above  - resolved    Chronic Dysphagia:   - Esophagram unremarkable  - GI following  - SLP eval and MBS shows silent aspiration (unclear if this is an acute issues in the setting of intubation/pneumonia, or if it has been present chronically)  - Symptoms and SLP findings may be more consistent with Oropharyngeal Dysphagia rather than Esophageal Dysphagia. - Seems unusual to have such poor swallowing mechanics at his young age. He does have severe weight loss and malnutrition, and this may be related. He may need further investigation into neuromuscular or connective tissue disorders if this persists. - GI recommends outpatient EGD/dilation; he needs to discuss with Cardiology timing of holding Plavix. Nausea, vomiting, diarrhea:  - Continue supportive care, likely viral etiology or secondary to the pneumonia itself. GI bacterial pathogens are negative. Pt is having formed stools now so C. Diff is unlikely.      Hyperlipidemia:  - Continue statin.     Coronary artery disease:   - Status post PCI to diag 1 with POBA/cutting balloon in 6/2019.  - Stable and asymptomatic; continue ranexa, aspirin, Plavix, statin.     Tobacco use:  - Quit 1 month ago. Encouraged ongoing abstinence.      Anxiety/chronic pain:  - Continue home xanax and percocet      Exam:   /75   Pulse 55   Temp 98.1 °F (36.7 °C) (Oral)   Resp 18   Ht 5' 8\" (1.727 m)   Wt 149 lb 4.8 oz (67.7 kg)   SpO2 93%   BMI 22.70 kg/m²   General appearance: No apparent distress, appears stated age and cooperative. HEENT: Pupils equal, round, and reactive to light. Conjunctivae/corneas clear. Neck: Supple, with full range of motion. No jugular venous distention. Trachea midline. Respiratory:  Normal respiratory effort. Clear to auscultation, bilaterally without Rales/Wheezes/Rhonchi. Cardiovascular: Regular rate and rhythm with normal S1/S2 without murmurs, rubs or gallops. Abdomen: Soft, non-tender, non-distended with normal bowel sounds. Musculoskeletal: No clubbing, cyanosis or edema bilaterally. Full range of motion without deformity. Skin: Skin color, texture, turgor normal.  No rashes or lesions. Neurologic:  Neurovascularly intact without any focal sensory/motor deficits. Cranial nerves: II-XII intact, grossly non-focal.  Psychiatric: Alert and oriented, thought content appropriate, normal insight  Capillary Refill: Brisk,< 3 seconds   Peripheral Pulses: +2 palpable, equal bilaterally       Patient Discharge Instructions: Follow up:  1. Primary Care Provider Dr. Heidi Odonnell MD in the next 1-2 weeks. 2.  Gastroenterology Dr. Last Kebede for Upper Endoscopy (EGD)  3.  Pulmonology    Discharge Medications:   Discharge Medication List as of 8/26/2019  3:07 PM        Discharge Medication List as of 8/26/2019  3:07 PM        Discharge Medication List as of 8/26/2019  3:07 PM      CONTINUE these medications which have NOT CHANGED    Details   ALPRAZolam (XANAX) 0.5 MG tablet Take 0.5 mg by mouth 3 times daily as needed for Sleep. Historical Med      oxyCODONE-acetaminophen (PERCOCET) 7.5-325 MG per tablet Take 1 tablet by mouth 4 times daily as needed for Pain. Historical Med      rosuvastatin (CRESTOR) 20 MG tablet Take 20 mg by mouth nightlyHistorical Med      zolpidem (AMBIEN) 10 MG tablet Take 10 mg by mouth nightly as needed for Sleep. Historical Med      ranolazine (RANEXA) 500 MG extended release tablet Take 500 mg by mouth 2 times dailyHistorical Med      aspirin 81 MG chewable tablet Take 1 tablet by mouth daily, Disp-30 tablet, R-11Normal nitroGLYCERIN (NITROSTAT) 0.4 MG SL tablet up to max of 3 total doses. If no relief after 1 dose, call 911., Disp-25 tablet, R-3Normal      clopidogrel (PLAVIX) 75 MG tablet Take 1 tablet by mouth daily, Disp-30 tablet, R-11Normal           Discharge Medication List as of 8/26/2019  3:07 PM      STOP taking these medications       nitroGLYCERIN (NITRODUR) 0.4 MG/HR Comments:   Reason for Stopping:         zolpidem (AMBIEN CR) 12.5 MG CR tablet Comments:   Reason for Stopping:                 Significant Test Results    Xr Chest Standard (2 Vw)    Result Date: 8/25/2019  EXAMINATION: TWO XRAY VIEWS OF THE CHEST 8/25/2019 11:28 am COMPARISON: 08/24/2019 radiograph HISTORY: ORDERING SYSTEM PROVIDED HISTORY: PNA, resp failure TECHNOLOGIST PROVIDED HISTORY: Reason for exam:->PNA, resp failure Reason for Exam: PNA, resp failure FINDINGS: Right central venous catheter stable. Borderline cardiomegaly. Mediastinum is normal.  Vascular congestion is stable centrally and scattered ground-glass opacities are unchanged in the lower lung zones bilaterally. No significant skeletal findings. Stable appearance of the chest with mild bibasilar ground-glass opacities. Xr Chest Standard (2 Vw)    Result Date: 8/24/2019  EXAMINATION: TWO XRAY VIEWS OF THE CHEST 8/24/2019 9:53 am COMPARISON: 08/23/2019 radiograph HISTORY: ORDERING SYSTEM PROVIDED HISTORY: f/u imaging TECHNOLOGIST PROVIDED HISTORY: Reason for exam:->f/u imaging Reason for Exam: F/U IMAGING Acuity: Acute Type of Exam: Initial FINDINGS: Right central venous catheter stable. Interval extubation with enteric tube also removed. Borderline cardiomegaly. Mediastinum is normal.  Slight vascular congestion centrally. Overall improved aeration in the lungs with minimal scattered ground-glass opacities bilaterally. No pleural effusion. No significant skeletal findings. Interval extubation with improved aeration in the lungs.      Xr Chest Standard (2 Vw)    Result Date: 8/18/2019  EXAMINATION: TWO XRAY VIEWS OF THE CHEST 8/18/2019 8:10 pm COMPARISON: May 13, 2019 HISTORY: ORDERING SYSTEM PROVIDED HISTORY: cough, SOB TECHNOLOGIST PROVIDED HISTORY: Reason for exam:->cough, SOB Reason for Exam: Fever (abd pain/v/n/d and headache, SOB, cough) Acuity: Acute Type of Exam: Initial FINDINGS: Diffuse increased interstitial opacities. No cardiomegaly. No pulmonary edema. Bilateral interstitial lung opacities are likely infectious. Fl Esophagram    Result Date: 8/20/2019  EXAMINATION: DOUBLE CONTRAST ESOPHAGRAM 8/20/2019 1:28 pm TECHNIQUE: Double contrast esophagram was performed with barium and air contrast. FLUOROSCOPY DOSE AND TYPE OR TIME AND EXPOSURES: 1.6 minutes fluoroscopy 14 spot films COMPARISON: None HISTORY: ORDERING SYSTEM PROVIDED HISTORY: cervical fullness, feels like food is getting caught TECHNOLOGIST PROVIDED HISTORY: Reason for exam:->cervical fullness, feels like food is getting caught FINDINGS: Patient swallowed a barium coated tablet. This passed into the stomach unimpeded No stricture or obstructing mass seen. Tiny sliding hiatal hernia is seen. There is trace esophageal reflux. Tiny sliding hiatal hernia, with trace esophageal reflux     Xr Chest Portable    Result Date: 8/23/2019  EXAMINATION: ONE XRAY VIEW OF THE CHEST 8/23/2019 5:47 am COMPARISON: 08/22/2019 HISTORY: ORDERING SYSTEM PROVIDED HISTORY: vent TECHNOLOGIST PROVIDED HISTORY: Reason for exam:->vent Reason for Exam: vent FINDINGS: Endotracheal tube and nasogastric tube again noted. Right internal jugular central venous catheter is again seen, extending to the expected location of the right atrium. Diffuse bilateral heterogeneous opacity is again seen. The more focal opacity that had been seen at the right base is slightly decreased as compared to prior. No pneumothorax. Contrast is seen within bowel in the upper abdomen.      Persistent diffuse bilateral opacity, pneumothorax. Slight interval improvement in bilateral airspace disease. No acute osseous abnormality. Endotracheal tube tip in the mid trachea. Enteric tube tip in the body of the stomach. Slight interval improvement in bilateral airspace disease. Xr Chest Portable    Result Date: 8/21/2019  EXAMINATION: ONE XRAY VIEW OF THE CHEST 8/21/2019 8:28 am COMPARISON: 08/18/2018 HISTORY: ORDERING SYSTEM PROVIDED HISTORY: increased oxygen requirement TECHNOLOGIST PROVIDED HISTORY: Reason for exam:->increased oxygen requirement FINDINGS: Cardiomediastinal silhouette is enlarged. No pneumothorax or effusion. Significant interval increase in bilateral airspace opacities. No acute osseous abnormality. Significant interval increase in bilateral airspace disease which could represent multifocal pneumonia, pulmonary edema or developing ARDS. Increased size of the cardiac silhouette suspicious for pericardial effusion. Ct Chest Abdomen Pelvis Wo Contrast    Result Date: 8/21/2019  EXAMINATION: CT OF THE CHEST, ABDOMEN, AND PELVIS WITHOUT CONTRAST 8/21/2019 3:03 pm TECHNIQUE: CT of the chest, abdomen and pelvis was performed without the administration of intravenous contrast. Multiplanar reformatted images are provided for review. Dose modulation, iterative reconstruction, and/or weight based adjustment of the mA/kV was utilized to reduce the radiation dose to as low as reasonably achievable. COMPARISON: CT 03/06/2016 HISTORY: ORDERING SYSTEM PROVIDED HISTORY: resp failure TECHNOLOGIST PROVIDED HISTORY: Reason for exam:->resp failure Additional Contrast?->Radiologist Recommendation Reason for Exam: pt resp arrest today, barrium study yesterday Acuity: Acute Type of Exam: Initial FINDINGS: Chest: Mediastinum: Endotracheal tube tip in the mid trachea. Right IJ central line tip in the lower SVC. No mediastinal or axillary lymphadenopathy. Lungs/pleura: Central airways are grossly patent.   Diffuse ground-glass and consolidative opacities throughout the lungs bilaterally. Consolidation noted in the dependent bases as well. No pneumothorax. No evidence of barium aspiration. Soft Tissues/Bones: No suspicious osseous lesions. Abdomen/Pelvis: Organs: Liver, gallbladder, adrenals, spleen and pancreas normal.  No hydronephrosis. GI/Bowel: Severe streak artifact due to dense barium throughout the colon. No evidence of bowel obstruction. Enteric tube tip in the body of the stomach. Pelvis: Gomez catheter within the bladder. Prostate is unremarkable. Peritoneum/Retroperitoneum: No evidence of AAA or lymphadenopathy. Bones/Soft Tissues: No suspicious osseous lesions. Expansile lesion in the left inferior pubic ramus is only partially imaged but was present on prior CT from 2016. Extensive bilateral airspace disease could represent pulmonary edema or multifocal pneumonia. No evidence of barium aspiration. No acute intra-abdominal abnormality, though there is streak artifact from dense barium throughout the colon. Lucent expansile lesion in the left inferior pubic ramus which was present on prior study from 2016 and likely represents a benign etiology such as aneurysmal bone cyst.     Fl Modified Barium Swallow W Video    Result Date: 8/23/2019  EXAMINATION: MODIFIED BARIUM SWALLOW WAS PERFORMED IN CONJUNCTION WITH SPEECH PATHOLOGY SERVICES TECHNIQUE: Fluoroscopic evaluation of the swallowing mechanism was performed with multiple consistency of barium product. FLUOROSCOPY DOSE AND TYPE OR TIME AND EXPOSURES: 2.1 minutes fluoroscopy, 2 fluoroscopic images. COMPARISON: None HISTORY: ORDERING SYSTEM PROVIDED HISTORY: concern for aspiration TECHNOLOGIST PROVIDED HISTORY: Reason for exam:->concern for aspiration FINDINGS: Laryngeal penetration was seen with thin liquid. Laryngeal penetration was seen with nectar consistency in the neutral position though improved with chin-tuck.   Patient tolerated honey, puree, and solid consistency. Laryngeal penetration with thin liquid and with nectar consistency in the neutral position. Please see separate speech pathology report for full discussion of findings and recommendations. Consults:     IP CONSULT TO HOSPITALIST  IP CONSULT TO DIETITIAN  IP CONSULT TO DIETITIAN  PHARMACY TO DOSE VANCOMYCIN  IP CONSULT TO GI    Labs: For convenience and continuity at follow-up the following most recent labs are provided:    Lab Results   Component Value Date    WBC 15.2 08/23/2019    HGB 10.0 08/23/2019    HCT 30.4 08/23/2019    MCV 87.1 08/23/2019     08/23/2019     08/26/2019    K 4.5 08/26/2019    CL 96 08/26/2019    CO2 30 08/26/2019    BUN 29 08/26/2019    CREATININE 0.8 08/26/2019    CALCIUM 9.5 08/26/2019    PHOS 3.1 10/13/2018    TROPONINI <0.01 08/18/2019    ALKPHOS 183 08/22/2019    ALT 21 08/22/2019    AST 38 08/22/2019    BILITOT 0.4 08/22/2019    BILIDIR 0.18 03/01/2011    LABALBU 2.5 08/22/2019    LDLCALC 57 06/04/2019    TRIG 90 08/22/2019     Lab Results   Component Value Date    INR 0.98 06/04/2019         The patient was seen and examined on day of discharge and this discharge summary is in conjunction with any daily progress note from day of discharge. Time spent on discharge is more than 30 minutes in the examination, evaluation, counseling and review of medications and discharge plan. Signed:    Vernita Goltz, MD   8/28/2019    Thank you Analilia Franklin MD for the opportunity to be involved in this patient's care. If you have any questions or concerns please feel free to contact my office [phone].

## 2019-08-26 NOTE — PROGRESS NOTES
Lymphadenopathy:     He has no cervical adenopathy. Neurological: He is alert. No cranial nerve deficit. CN 2-12 grossly intact   Skin: Skin is warm and dry. No rash noted. He is not diaphoretic. Data Reviewed:   LABS:  CBC:No results for input(s): WBC, HGB, HCT, MCV, PLT in the last 72 hours. BMP:  Recent Labs     08/25/19  0515 08/26/19  0611   * 136   K 4.8 4.5   CL 94* 96*   CO2 31 30   BUN 31* 29*   CREATININE 0.7* 0.8*     LIVER PROFILE: No results for input(s): AST, ALT, LIPASE, ALB, BILIDIR, BILITOT, ALKPHOS in the last 72 hours. Invalid input(s): AMYLASE  PT/INR:No results for input(s): PROTIME, INR in the last 72 hours. APTT: No results for input(s): APTT in the last 72 hours. UA:No results for input(s): NITRITE, COLORU, PHUR, LABCAST, WBCUA, RBCUA, MUCUS, TRICHOMONAS, YEAST, BACTERIA, CLARITYU, SPECGRAV, LEUKOCYTESUR, UROBILINOGEN, BILIRUBINUR, BLOODU, GLUCOSEU, AMORPHOUS in the last 72 hours. Invalid input(s): KETONESU  No results for input(s): PHART, AGP6WPU, PO2ART in the last 72 hours. Vent Information  $Ventilation: $Subsequent Day  Ventilator Started: Yes  Vent Type: 840  Vent Mode: CPAP  Vt Ordered: 450 mL  Rate Set: 16 bmp  Peak Flow: 52 L/min  Pressure Support: 5 cmH20  FiO2 : 40 %  Sensitivity: 5  PEEP/CPAP: 5  I Time/ I Time %: 0.95 s  Cuff Pressure (cm H2O): 30 cm H2O  Humidification Source: Heated wire  Humidification Temp: 37  Humidification Temp Measured: 35.7    CXR personally reviewed, bilateral infiltrates          Assessment:     1. Acute resp failure, hypoxic, resolved  2. Aspiration pneumonitis, multifocal pneumonia  3. Oralpharyngeal dysphagia with associated weight loss and aspiration  4. Sepsis resolved    Plan:      -Ok to DC  -Completed five days of atb and steroids, no need to continue.  Especially since etiology of pneumonia was actually aspiration  -Aspiration precautions  -Nutrition optimization  -Needs follow up imaging in 2-4 weeks to ensure clearance  -He was agreeable to follow up with us in the office, will coordinate after discharge     Yamilet Gee MD

## 2019-08-26 NOTE — PROGRESS NOTES
None  · Current Nutrition Therapies:  · Oral Diet Orders: Dysphagia Minced and Moist (Dysphagia 2), Moderately Thick, No Straws   · Oral Diet intake: 1-25%  · Oral Nutrition Supplement (ONS) Orders: None  · ONS intake: Unable to assess  · Anthropometric Measures:  · Ht: 5' 8\" (172.7 cm)   · Current Body Wt: 149 lb 4.8 oz (67.7 kg)  · Ideal Body Wt: 154 lb (69.9 kg),  · BMI Classification: BMI 18.5 - 24.9 Normal Weight    Nutrition Interventions:   Continue current diet, Start ONS  Continued Inpatient Monitoring    Nutrition Evaluation:   · Evaluation: Goals set   · Goals:  Tolerate diet and have po intakes 50% or greater this admission    · Monitoring: Meal Intake, Supplement Intake, Diet Tolerance, Nutrition Progression, Weight, Pertinent Labs      Electronically signed by Leon Rodriguez RD, MARCELA on 8/26/19 at 11:57 AM    Contact Number: Office: [phone]; [address]: [phone]

## 2019-09-03 ENCOUNTER — OFFICE VISIT (OUTPATIENT)
Dept: PULMONOLOGY | Age: 49
End: 2019-09-03
Payer: COMMERCIAL

## 2019-09-03 VITALS
WEIGHT: 137.6 LBS | HEIGHT: 67 IN | DIASTOLIC BLOOD PRESSURE: 88 MMHG | TEMPERATURE: 98.4 F | RESPIRATION RATE: 18 BRPM | OXYGEN SATURATION: 98 % | HEART RATE: 75 BPM | BODY MASS INDEX: 21.6 KG/M2 | SYSTOLIC BLOOD PRESSURE: 124 MMHG

## 2019-09-03 DIAGNOSIS — J84.9 ILD (INTERSTITIAL LUNG DISEASE) (HCC): Primary | ICD-10-CM

## 2019-09-03 DIAGNOSIS — F17.200 TOBACCO DEPENDENCE: ICD-10-CM

## 2019-09-03 PROCEDURE — G8598 ASA/ANTIPLAT THER USED: HCPCS | Performed by: INTERNAL MEDICINE

## 2019-09-03 PROCEDURE — 1036F TOBACCO NON-USER: CPT | Performed by: INTERNAL MEDICINE

## 2019-09-03 PROCEDURE — G8420 CALC BMI NORM PARAMETERS: HCPCS | Performed by: INTERNAL MEDICINE

## 2019-09-03 PROCEDURE — 1111F DSCHRG MED/CURRENT MED MERGE: CPT | Performed by: INTERNAL MEDICINE

## 2019-09-03 PROCEDURE — 99214 OFFICE O/P EST MOD 30 MIN: CPT | Performed by: INTERNAL MEDICINE

## 2019-09-03 PROCEDURE — G8427 DOCREV CUR MEDS BY ELIG CLIN: HCPCS | Performed by: INTERNAL MEDICINE

## 2019-09-05 ASSESSMENT — ENCOUNTER SYMPTOMS
SORE THROAT: 0
VOICE CHANGE: 0
DIARRHEA: 0
STRIDOR: 0
ABDOMINAL PAIN: 0
CONSTIPATION: 0
EYE DISCHARGE: 0
EYE PAIN: 0
CHEST TIGHTNESS: 0
CHOKING: 0
EYE ITCHING: 0

## 2019-09-05 NOTE — PROGRESS NOTES
constipation and diarrhea. Endocrine: Negative for cold intolerance, heat intolerance and polydipsia. Genitourinary: Negative for dysuria, frequency and hematuria. Musculoskeletal: Negative for gait problem, joint swelling and neck stiffness. Neurological: Negative for dizziness, numbness and headaches. Psychiatric/Behavioral: Negative for agitation, confusion and hallucinations. Physical Exam   Constitutional: He appears well-developed and well-nourished. No distress. HENT:   Head: Normocephalic and atraumatic. Mouth/Throat: Oropharynx is clear and moist. No oropharyngeal exudate. Eyes: Pupils are equal, round, and reactive to light. EOM are normal.   Neck: Neck supple. No JVD present. Cardiovascular: Normal heart sounds. Exam reveals no gallop and no friction rub. No murmur heard. Pulmonary/Chest: Effort normal. He has no wheezes. He has no rales. Equal chest rise and expansion bilaterally   Abdominal: Soft. Bowel sounds are normal. He exhibits no distension. There is no tenderness. Musculoskeletal: Normal range of motion. He exhibits no edema. Lymphadenopathy:     He has no cervical adenopathy. Neurological: He is alert. No cranial nerve deficit. CN 2-12 grossly intact   Skin: Skin is warm and dry. No rash noted. He is not diaphoretic. ASSESSMENT:    1. ILD (interstitial lung disease) ([address]    2.  Tobacco dependence      PLAN:    -Repeat CT to ensure resolution of pneumonitis findings, if still present will need to further evaluate  -Encourage weight gain, offered to refer back to speech to reassess swallowing but he declined  -Counseled on smoking and vape cessation  -Influenza and pneumonia vaccinations discussed     Orders Placed This Encounter   Procedures    CT Chest Yesenia Oswald MD

## 2019-09-23 LAB
FUNGUS (MYCOLOGY) CULTURE: ABNORMAL
FUNGUS STAIN: ABNORMAL
ORGANISM: ABNORMAL

## 2019-10-03 ENCOUNTER — OFFICE VISIT (OUTPATIENT)
Dept: PULMONOLOGY | Age: 49
End: 2019-10-03
Payer: COMMERCIAL

## 2019-10-03 ENCOUNTER — HOSPITAL ENCOUNTER (OUTPATIENT)
Dept: CT IMAGING | Age: 49
Discharge: HOME | End: 2019-10-03
Payer: COMMERCIAL

## 2019-10-03 VITALS
WEIGHT: 137.6 LBS | SYSTOLIC BLOOD PRESSURE: 120 MMHG | OXYGEN SATURATION: 98 % | HEART RATE: 68 BPM | HEIGHT: 67 IN | BODY MASS INDEX: 21.6 KG/M2 | RESPIRATION RATE: 16 BRPM | DIASTOLIC BLOOD PRESSURE: 80 MMHG

## 2019-10-03 DIAGNOSIS — K22.2 ESOPHAGEAL STRICTURE: ICD-10-CM

## 2019-10-03 DIAGNOSIS — J84.9 ILD (INTERSTITIAL LUNG DISEASE) (HCC): ICD-10-CM

## 2019-10-03 DIAGNOSIS — J41.0 SIMPLE CHRONIC BRONCHITIS (HCC): Primary | ICD-10-CM

## 2019-10-03 PROCEDURE — 3023F SPIROM DOC REV: CPT | Performed by: INTERNAL MEDICINE

## 2019-10-03 PROCEDURE — G8420 CALC BMI NORM PARAMETERS: HCPCS | Performed by: INTERNAL MEDICINE

## 2019-10-03 PROCEDURE — 71250 CT THORAX DX C-: CPT

## 2019-10-03 PROCEDURE — G8427 DOCREV CUR MEDS BY ELIG CLIN: HCPCS | Performed by: INTERNAL MEDICINE

## 2019-10-03 PROCEDURE — 1036F TOBACCO NON-USER: CPT | Performed by: INTERNAL MEDICINE

## 2019-10-03 PROCEDURE — G8484 FLU IMMUNIZE NO ADMIN: HCPCS | Performed by: INTERNAL MEDICINE

## 2019-10-03 PROCEDURE — G8598 ASA/ANTIPLAT THER USED: HCPCS | Performed by: INTERNAL MEDICINE

## 2019-10-03 PROCEDURE — G8926 SPIRO NO PERF OR DOC: HCPCS | Performed by: INTERNAL MEDICINE

## 2019-10-03 PROCEDURE — 99214 OFFICE O/P EST MOD 30 MIN: CPT | Performed by: INTERNAL MEDICINE

## 2019-10-03 RX ORDER — QUETIAPINE FUMARATE 25 MG/1
TABLET, FILM COATED ORAL
Refills: 5 | COMMUNITY
Start: 2019-09-12

## 2019-10-03 RX ORDER — ALBUTEROL SULFATE 90 UG/1
2 AEROSOL, METERED RESPIRATORY (INHALATION) EVERY 6 HOURS PRN
Qty: 1 INHALER | Refills: 3 | Status: SHIPPED | OUTPATIENT
Start: 2019-10-03

## 2019-10-03 ASSESSMENT — ENCOUNTER SYMPTOMS
VOICE CHANGE: 0
ABDOMINAL PAIN: 0
CHOKING: 0
EYE PAIN: 0
CHEST TIGHTNESS: 0
EYE ITCHING: 0
STRIDOR: 0
EYE DISCHARGE: 0
SORE THROAT: 0
CONSTIPATION: 0
DIARRHEA: 0

## 2019-10-08 LAB
AFB CULTURE (MYCOBACTERIA): NORMAL
AFB SMEAR: NORMAL

## 2019-10-11 RX ORDER — ASPIRIN 81 MG/1
TABLET, CHEWABLE ORAL
Qty: 90 TABLET | Refills: 1 | Status: SHIPPED | OUTPATIENT
Start: 2019-10-11 | End: 2020-05-11

## 2019-10-18 RX ORDER — CLOPIDOGREL BISULFATE 75 MG/1
TABLET ORAL
Qty: 90 TABLET | Refills: 1 | Status: SHIPPED | OUTPATIENT
Start: 2019-10-18 | End: 2020-06-05

## 2019-10-29 ENCOUNTER — TELEPHONE (OUTPATIENT)
Dept: CARDIOLOGY CLINIC | Age: 49
End: 2019-10-29

## 2020-04-21 NOTE — PROGRESS NOTES
pain    Coronary artery disease involving native coronary artery of native heart with unstable angina pectoris ([address]    Pure hypercholesterolemia    Unstable angina (HCC)    Combined hyperlipidemia    S/P PTCA (percutaneous transluminal coronary angioplasty)    Pneumonia due to organism    Hypoxia    Nausea vomiting and diarrhea    Acute respiratory failure with hypoxia and hypercapnia (HCC)    Septic shock (HCC)    Dysphagia    Former smoker         Past Medical History:   has a past medical history of CAD (coronary artery disease), Chest pain, Chronic pain syndrome, Current smoker, DDD (degenerative disc disease), lumbar, Facet joint syndrome, High cholesterol, Hypertension, IgA nephropathy, Insomnia, Neuropathic pain, NSTEMI (non-ST elevated myocardial infarction) ([address] and Pain management contract agreement. Surgical History:   has a past surgical history that includes Percutaneous Transluminal Coronary Angio (10/15/2018); Dental surgery; Coronary angioplasty (06/04/2019); bronchoscopy (N/A, 8/21/2019); and bronchoscopy (8/21/2019). Social History:   reports that he quit smoking about 18 months ago. His smoking use included cigarettes. He has a 10.00 pack-year smoking history. He has never used smokeless tobacco. He reports that he does not drink alcohol or use drugs. Family History:  No evidence for sudden cardiac death or premature CAD    Home Medications:  Reviewed and are listed in nursing record. and/or listed below  Current Outpatient Medications   Medication Sig Dispense Refill    pantoprazole (PROTONIX) 40 MG tablet Take 40 mg by mouth daily      clopidogrel (PLAVIX) 75 MG tablet TAKE ONE TABLET BY MOUTH DAILY 90 tablet 1    aspirin 81 MG chewable tablet TAKE ONE TABLET BY MOUTH DAILY 90 tablet 1    QUEtiapine (SEROQUEL) 25 MG tablet TK 1 T PO QPM  5    ALPRAZolam (XANAX) 0.5 MG tablet Take 0.5 mg by mouth nightly as needed for Sleep.        oxyCODONE-acetaminophen (PERCOCET) 7.5-325 MG per tablet Take 1 tablet by mouth 4 times daily as needed for Pain.  rosuvastatin (CRESTOR) 20 MG tablet Take 20 mg by mouth nightly      ranolazine (RANEXA) 500 MG extended release tablet Take 500 mg by mouth 2 times daily      nitroGLYCERIN (NITROSTAT) 0.4 MG SL tablet up to max of 3 total doses. If no relief after 1 dose, call 911. 25 tablet 3    albuterol sulfate HFA (VENTOLIN HFA) 108 (90 Base) MCG/ACT inhaler Inhale 2 puffs into the lungs every 6 hours as needed for Wheezing (Patient not taking: Reported on 4/23/2020) 1 Inhaler 3    zolpidem (AMBIEN) 10 MG tablet Take 10 mg by mouth nightly as needed for Sleep. No current facility-administered medications for this visit. Allergies:  Penicillins     Review of Systems:   A 14 point review of symptoms completed. Pertinent positives identified in the HPI, all other review of symptoms negative as below.     Objective:   PHYSICAL EXAM:    Vitals:    04/23/20 0811   BP: (!) 140/76   Pulse:    SpO2:     Weight: 190 lb 12.8 oz (86.5 kg)     Wt Readings from Last 3 Encounters:   04/23/20 190 lb 12.8 oz (86.5 kg)   10/03/19 137 lb 9.6 oz (62.4 kg)   09/03/19 137 lb 9.6 oz (62.4 kg)         General Appearance:  Alert, cooperative, no distress, appears stated age   Head:  Normocephalic, atraumatic   Eyes:  PERRL, conjunctiva/corneas clear   Nose: Nares normal, no drainage or sinus tenderness   Throat: Lips, mucosa, and tongue normal   Neck: Supple, symmetrical, trachea midline, NL thyroid no carotid bruit or JVD   Lungs:   CTAB, respirations unlabored   Chest Wall:  No tenderness or deformity   Heart:  Regular rhythm and normal rate; S1, S2 are normal;   no murmur noted; no rub or gallop   Abdomen:   Soft, non-tender, +BS x 4, no masses, no organomegaly   Extremities: Extremities normal, atraumatic, no cyanosis or edema   Pulses: 2+ and symmetric   Skin: Skin color, texture, turgor normal, no rashes or lesions   Pysch: Normal mood and affect   Neurologic: Normal gross motor and sensory exam.         LABS   CBC:      Lab Results   Component Value Date    WBC 15.2 2019    RBC 3.49 2019    HGB 10.0 2019    HCT 30.4 2019    MCV 87.1 2019    RDW 13.3 2019     2019     CMP:  Lab Results   Component Value Date     2019    K 4.5 2019    CL 96 2019    CO2 30 2019    BUN 29 2019    CREATININE 0.8 2019    GFRAA >60 2019    GFRAA >60 2013    AGRATIO 0.6 2019    LABGLOM >60 2019    GLUCOSE 88 2019    PROT 6.8 2019    PROT 7.7 05/10/2012    CALCIUM 9.5 2019    BILITOT 0.4 2019    ALKPHOS 183 2019    AST 38 2019    ALT 21 2019     PT/INR:   No results found for: PTINR  Liver:  No components found for: CHLPL  Lab Results   Component Value Date    ALT 21 2019    AST 38 (H) 2019    ALKPHOS 183 (H) 2019    BILITOT 0.4 2019     No results found for: LABA1C  Lipids:         Lab Results   Component Value Date    TRIG 90 2019    TRIG 94 2019    TRIG 155 (H) 10/14/2018            Lab Results   Component Value Date    HDL 44 2019    HDL 38 (L) 2019    HDL 34 (L) 10/14/2018            Lab Results   Component Value Date    LDLCALC 57 2019    LDLCALC 43 2019    LDLCALC 103 (H) 10/14/2018            Lab Results   Component Value Date    LABVLDL 14 2019    LABVLDL 19 2019    LABVLDL 31 10/14/2018         CARDIAC DATA   EK2019  Sinus bradycardiaOtherwise normal ECGWhen compared with ECG of 13-MAY-2019 13:44,QRS duration has decreasedConfirmed by Flora Teran MD, Preston Rincon ([id number]) on 2019 6:25:10 PM      ECHO/MUGA:    LV systolic function is normal with ejection fraction estimated at 55%. Mild hypokinesis of the distal apical-septal segment on certain views. Grade I diastolic dysfunction with normal left ventricular filling pressure. Almaz Rutledge MD, [id number] [facility] Cardiologist  [address]  [phone] [address] [facility]  [phone] [address] [place] [address]

## 2020-04-23 ENCOUNTER — OFFICE VISIT (OUTPATIENT)
Dept: CARDIOLOGY CLINIC | Age: 50
End: 2020-04-23
Payer: COMMERCIAL

## 2020-04-23 ENCOUNTER — HOSPITAL ENCOUNTER (OUTPATIENT)
Age: 50
Discharge: HOME | End: 2020-04-23
Payer: COMMERCIAL

## 2020-04-23 VITALS
BODY MASS INDEX: 29.95 KG/M2 | HEIGHT: 67 IN | WEIGHT: 190.8 LBS | HEART RATE: 81 BPM | DIASTOLIC BLOOD PRESSURE: 76 MMHG | SYSTOLIC BLOOD PRESSURE: 140 MMHG | OXYGEN SATURATION: 98 %

## 2020-04-23 LAB — TROPONIN: <0.01 NG/ML

## 2020-04-23 PROCEDURE — G8427 DOCREV CUR MEDS BY ELIG CLIN: HCPCS | Performed by: INTERNAL MEDICINE

## 2020-04-23 PROCEDURE — 36415 COLL VENOUS BLD VENIPUNCTURE: CPT

## 2020-04-23 PROCEDURE — 84484 ASSAY OF TROPONIN QUANT: CPT

## 2020-04-23 PROCEDURE — 99214 OFFICE O/P EST MOD 30 MIN: CPT | Performed by: INTERNAL MEDICINE

## 2020-04-23 PROCEDURE — 1036F TOBACCO NON-USER: CPT | Performed by: INTERNAL MEDICINE

## 2020-04-23 PROCEDURE — G8419 CALC BMI OUT NRM PARAM NOF/U: HCPCS | Performed by: INTERNAL MEDICINE

## 2020-04-23 RX ORDER — PANTOPRAZOLE SODIUM 40 MG/1
40 TABLET, DELAYED RELEASE ORAL DAILY
COMMUNITY

## 2020-04-23 RX ORDER — METOPROLOL SUCCINATE 25 MG/1
25 TABLET, EXTENDED RELEASE ORAL DAILY
Qty: 30 TABLET | Refills: 3 | Status: SHIPPED | OUTPATIENT
Start: 2020-04-23 | End: 2020-09-01

## 2020-04-23 NOTE — LETTER
 Chest pain    Coronary artery disease involving native coronary artery of native heart with unstable angina pectoris ([facility] [address]    Pure hypercholesterolemia    Unstable angina (HCC)    Combined hyperlipidemia    S/P PTCA (percutaneous transluminal coronary angioplasty)    Pneumonia due to organism    Hypoxia    Nausea vomiting and diarrhea    Acute respiratory failure with hypoxia and hypercapnia (HCC)    Septic shock (HCC)    Dysphagia    Former smoker         Past Medical History:   has a past medical history of CAD (coronary artery disease), Chest pain, Chronic pain syndrome, Current smoker, DDD (degenerative disc disease), lumbar, Facet joint syndrome, High cholesterol, Hypertension, IgA nephropathy, Insomnia, Neuropathic pain, NSTEMI (non-ST elevated myocardial infarction) ([facility] [address] and Pain management contract agreement. Surgical History:   has a past surgical history that includes Percutaneous Transluminal Coronary Angio (10/15/2018); Dental surgery; Coronary angioplasty (06/04/2019); bronchoscopy (N/A, 8/21/2019); and bronchoscopy (8/21/2019). Social History:   reports that he quit smoking about 18 months ago. His smoking use included cigarettes. He has a 10.00 pack-year smoking history. He has never used smokeless tobacco. He reports that he does not drink alcohol or use drugs. Family History:  No evidence for sudden cardiac death or premature CAD    Home Medications:  Reviewed and are listed in nursing record. and/or listed below  Current Outpatient Medications   Medication Sig Dispense Refill    pantoprazole (PROTONIX) 40 MG tablet Take 40 mg by mouth daily      clopidogrel (PLAVIX) 75 MG tablet TAKE ONE TABLET BY MOUTH DAILY 90 tablet 1    aspirin 81 MG chewable tablet TAKE ONE TABLET BY MOUTH DAILY 90 tablet 1    QUEtiapine (SEROQUEL) 25 MG tablet TK 1 T PO QPM  5    ALPRAZolam (XANAX) 0.5 MG tablet Take 0.5 mg by mouth nightly as needed for Sleep. Mild hypokinesis of the distal apical-septal segment on certain views. Grade I diastolic dysfunction with normal left ventricular filling pressure. Mild pulmonic regurgitation       STRESS TEST: 1/2019       Low normal LV function with normal wall motion    Small to moderate area of inferior ischemia (likely related to noted disease    in obtuse marginal in recent cath)    Excellent functional capacity    Overall this is a low to intermediate risk study        CARDIAC CATH: 6/4/19     LEFT HEART CATH  LM: distal 15%  LAD: mild disease in prox and mid 30-40%, previous distal lesion not seen                Diag1- prox 60% (FFR+) mid stent patent with mild restenosis of old stent  LCX: luminals              OM1- previous lesion significant improved 20% (leter in case about 60% c/w vasospasm)                OM2- prox 40%  RCA: dominant,  luminals     LVEDP: 5  LVEF: 60%     FFR of OM2: 0.97-->0.85  FFR of Diag1:  0.89-->0.71  Assessment  1. Compared to last cath of 10/2018 several lesions different and improved in size suggestive vasospasm  2. Negative FFR of previous LCX lesion. + vasospasm  3. Abnormal FFR of Diag trested successfully for POBA and cutting balloons. - residual 10%                - if restenosis, will consider OCT, laser and KEITH PCI     Cath: 10/2018    PCI of Diag 1  STENT: Integritiy bare metal 2.25 x 18mm   post dilated with 2.25mm         VASCULAR/OTHER IMAGING:      Assessment and Plan   Jose Raul Alva is a 52 y.o. male who presents today for the following problems:       1. CAD              - 10/2018 s/p PCI to Diag   - -6/2019 PCI to Diag 1 for restenosis  2. HTN: well controlled  3. HLD: well controlled  4. Coronary artery spasm: seen last cath  5. Tobacco abuse: quit smoking! PLAN  1. Some vague CP similar to previous, but ? Stress related   - GXT-myoview  2. Start toprol 25mg po qday given HR now improved  3.  Take Ntg prn for CP, call if increasing use 3. Cont current meds including DAPT with plavix         Patient Active Problem List   Diagnosis    IgA nephropathy    Anxiety    HTN (hypertension)    Contusion of toe    Tobacco use disorder    Lumbar disc disease    Arthritis of shoulder region, degenerative    DDD (degenerative disc disease), lumbosacral    Chronic pain syndrome    DDD (degenerative disc disease), lumbar    Facet joint syndrome    Neuropathic pain    Insomnia    Plantar fasciitis    NSTEMI (non-ST elevated myocardial infarction) ([address]    Chest pain    Coronary artery disease involving native coronary artery of native heart with unstable angina pectoris ([address]    Pure hypercholesterolemia    Unstable angina (HCC)    Combined hyperlipidemia    S/P PTCA (percutaneous transluminal coronary angioplasty)    Pneumonia due to organism    Hypoxia    Nausea vomiting and diarrhea    Acute respiratory failure with hypoxia and hypercapnia ([address]    Septic shock ([address]    Dysphagia    Former smoker       Patient Plan:  1. Stress test to risk stratify   2. Labs - Troponin's  3. We will call you with the results  4. Rx for Toprol XL 25 mg once daily        It is a pleasure to assist in the care of Pa Sheth. Please call with any questions. This note was scribed in the presence of Catherine Adame MD by Sarbjit Clifton RN.      Trey Reddy MD, personally performed the services described in this documentation as scribed by the above signed scribe in my presence, and it is both accurate and complete to the best of our ability and knowledge. I agree with the details independently gathered by my clinical support staff, while the remaining scribed note accurately describes my personal service to the patient. The above RN is working as a scribe for and in the presence of myself .   Working as a scribe, the RN may have prepopulated components of this note with my historical intellectual property under my direct supervision. Any additions to this intellectual property were performed at my direction. Furthermore, the content and accuracy of this note have been reviewed by me to the best of my ability.          uLdy Parker MD, [facility] Cardiologist  [facility]  [phone] [facility]  [phone] [address] [place] [address]

## 2020-04-24 ENCOUNTER — TELEPHONE (OUTPATIENT)
Dept: CARDIOLOGY CLINIC | Age: 50
End: 2020-04-24

## 2020-04-24 NOTE — TELEPHONE ENCOUNTER
----- Message from Bambi Hodges MD sent at 4/23/2020  5:06 PM EDT -----  Let pt knwo that troponin was negative

## 2020-05-11 RX ORDER — ASPIRIN 81 MG
TABLET,CHEWABLE ORAL
Qty: 90 TABLET | Refills: 3 | Status: SHIPPED | OUTPATIENT
Start: 2020-05-11 | End: 2021-05-11

## 2020-06-05 RX ORDER — CLOPIDOGREL BISULFATE 75 MG/1
TABLET ORAL
Qty: 90 TABLET | Refills: 1 | Status: SHIPPED | OUTPATIENT
Start: 2020-06-05 | End: 2020-12-07

## 2020-07-10 ENCOUNTER — TELEPHONE (OUTPATIENT)
Dept: CARDIOLOGY CLINIC | Age: 50
End: 2020-07-10

## 2020-07-10 RX ORDER — RANOLAZINE 500 MG/1
500 TABLET, EXTENDED RELEASE ORAL 2 TIMES DAILY
Qty: 60 TABLET | Refills: 5 | Status: SHIPPED | OUTPATIENT
Start: 2020-07-10

## 2020-07-10 NOTE — TELEPHONE ENCOUNTER
Pt called and needed a refill of ranolazine (RANEXA) 500 MG extended release tablet    Taking BID, is out of medication now    [address], [address] [place] [address] [phone] - f [phone]

## 2020-07-17 ENCOUNTER — HOSPITAL ENCOUNTER (OUTPATIENT)
Dept: CARDIOLOGY | Age: 50
Discharge: HOME | End: 2020-07-17
Payer: COMMERCIAL

## 2020-07-17 LAB
LV EF: 51 %
LVEF MODALITY: NORMAL

## 2020-07-17 PROCEDURE — 3430000000 HC RX DIAGNOSTIC RADIOPHARMACEUTICAL: Performed by: INTERNAL MEDICINE

## 2020-07-17 PROCEDURE — 78452 HT MUSCLE IMAGE SPECT MULT: CPT

## 2020-07-17 PROCEDURE — 93017 CV STRESS TEST TRACING ONLY: CPT

## 2020-07-17 PROCEDURE — A9502 TC99M TETROFOSMIN: HCPCS | Performed by: INTERNAL MEDICINE

## 2020-07-17 RX ADMIN — TETROFOSMIN 10.8 MILLICURIE: 1.38 INJECTION, POWDER, LYOPHILIZED, FOR SOLUTION INTRAVENOUS at 08:35

## 2020-07-17 RX ADMIN — TETROFOSMIN 30.1 MILLICURIE: 1.38 INJECTION, POWDER, LYOPHILIZED, FOR SOLUTION INTRAVENOUS at 09:50

## 2020-07-21 ENCOUNTER — TELEPHONE (OUTPATIENT)
Dept: CARDIOLOGY CLINIC | Age: 50
End: 2020-07-21

## 2020-07-21 NOTE — TELEPHONE ENCOUNTER
Created telephone encounter. Per Pt HIPAA from can leave results on machine. LMOM relaying message per JJP . Pt to call the office with any concerns.

## 2020-07-21 NOTE — TELEPHONE ENCOUNTER
----- Message from Rosita Lopez MD sent at 7/20/2020  4:07 PM EDT -----  Let pt know that stress test showed old blockage but nothing new.  Will follow

## 2020-09-01 RX ORDER — METOPROLOL SUCCINATE 25 MG/1
TABLET, EXTENDED RELEASE ORAL
Qty: 30 TABLET | Refills: 5 | Status: SHIPPED | OUTPATIENT
Start: 2020-09-01 | End: 2021-03-08

## 2020-10-14 ENCOUNTER — HOSPITAL ENCOUNTER (OUTPATIENT)
Age: 50
Setting detail: OBSERVATION
Discharge: HOME | End: 2020-10-15
Attending: EMERGENCY MEDICINE | Admitting: HOSPITALIST
Payer: COMMERCIAL

## 2020-10-14 ENCOUNTER — APPOINTMENT (OUTPATIENT)
Dept: CT IMAGING | Age: 50
End: 2020-10-14
Payer: COMMERCIAL

## 2020-10-14 PROBLEM — K50.00 TERMINAL ILEITIS OF SMALL INTESTINE (HCC): Status: ACTIVE | Noted: 2020-10-14

## 2020-10-14 PROBLEM — K50.018 TERMINAL ILEITIS OF SMALL INTESTINE, OTHER COMPLICATION (HCC): Status: ACTIVE | Noted: 2020-10-14

## 2020-10-14 LAB
A/G RATIO: 1.4 (ref 1.1–2.2)
ALBUMIN SERPL-MCNC: 4.4 G/DL (ref 3.4–5)
ALP BLD-CCNC: 89 U/L (ref 40–129)
ALT SERPL-CCNC: 23 U/L (ref 10–40)
ANION GAP SERPL CALCULATED.3IONS-SCNC: 12 MMOL/L (ref 3–16)
AST SERPL-CCNC: 20 U/L (ref 15–37)
BACTERIA: ABNORMAL /HPF
BASOPHILS ABSOLUTE: 0 K/UL (ref 0–0.2)
BASOPHILS RELATIVE PERCENT: 0.6 %
BILIRUB SERPL-MCNC: 0.5 MG/DL (ref 0–1)
BILIRUBIN URINE: ABNORMAL
BLOOD, URINE: ABNORMAL
BUN BLDV-MCNC: 16 MG/DL (ref 7–20)
C DIFF TOXIN/ANTIGEN: NORMAL
C-REACTIVE PROTEIN: 52.3 MG/L (ref 0–5.1)
CALCIUM SERPL-MCNC: 9.4 MG/DL (ref 8.3–10.6)
CHLORIDE BLD-SCNC: 104 MMOL/L (ref 99–110)
CLARITY: CLEAR
CO2: 24 MMOL/L (ref 21–32)
COLOR: YELLOW
CREAT SERPL-MCNC: 1.3 MG/DL (ref 0.9–1.3)
EOSINOPHILS ABSOLUTE: 0.1 K/UL (ref 0–0.6)
EOSINOPHILS RELATIVE PERCENT: 0.9 %
EPITHELIAL CELLS, UA: ABNORMAL /HPF (ref 0–5)
GFR AFRICAN AMERICAN: >60
GFR NON-AFRICAN AMERICAN: 59
GLOBULIN: 3.2 G/DL
GLUCOSE BLD-MCNC: 116 MG/DL (ref 70–99)
GLUCOSE URINE: NEGATIVE MG/DL
HCT VFR BLD CALC: 43.3 % (ref 40.5–52.5)
HEMOGLOBIN: 14.8 G/DL (ref 13.5–17.5)
KETONES, URINE: NEGATIVE MG/DL
LEUKOCYTE ESTERASE, URINE: NEGATIVE
LIPASE: 59 U/L (ref 13–60)
LYMPHOCYTES ABSOLUTE: 1.1 K/UL (ref 1–5.1)
LYMPHOCYTES RELATIVE PERCENT: 15.6 %
MCH RBC QN AUTO: 29.4 PG (ref 26–34)
MCHC RBC AUTO-ENTMCNC: 34.1 G/DL (ref 31–36)
MCV RBC AUTO: 86.3 FL (ref 80–100)
MICROSCOPIC EXAMINATION: YES
MONOCYTES ABSOLUTE: 0.9 K/UL (ref 0–1.3)
MONOCYTES RELATIVE PERCENT: 12.9 %
MUCUS: ABNORMAL /LPF
NEUTROPHILS ABSOLUTE: 4.8 K/UL (ref 1.7–7.7)
NEUTROPHILS RELATIVE PERCENT: 70 %
NITRITE, URINE: NEGATIVE
OCCULT BLOOD SCREENING: NORMAL
PDW BLD-RTO: 13.4 % (ref 12.4–15.4)
PH UA: 5.5 (ref 5–8)
PLATELET # BLD: 171 K/UL (ref 135–450)
PMV BLD AUTO: 8.1 FL (ref 5–10.5)
POTASSIUM SERPL-SCNC: 4.4 MMOL/L (ref 3.5–5.1)
PROTEIN UA: ABNORMAL MG/DL
RBC # BLD: 5.02 M/UL (ref 4.2–5.9)
RBC UA: ABNORMAL /HPF (ref 0–4)
SEDIMENTATION RATE, ERYTHROCYTE: 4 MM/HR (ref 0–15)
SODIUM BLD-SCNC: 140 MMOL/L (ref 136–145)
SPECIFIC GRAVITY UA: >=1.03 (ref 1–1.03)
TOTAL PROTEIN: 7.6 G/DL (ref 6.4–8.2)
URINE TYPE: ABNORMAL
UROBILINOGEN, URINE: 0.2 E.U./DL
WBC # BLD: 6.8 K/UL (ref 4–11)
WBC UA: ABNORMAL /HPF (ref 0–5)
WHITE BLOOD CELLS (WBC), STOOL: ABNORMAL

## 2020-10-14 PROCEDURE — G0378 HOSPITAL OBSERVATION PER HR: HCPCS

## 2020-10-14 PROCEDURE — 6360000004 HC RX CONTRAST MEDICATION: Performed by: EMERGENCY MEDICINE

## 2020-10-14 PROCEDURE — 87040 BLOOD CULTURE FOR BACTERIA: CPT

## 2020-10-14 PROCEDURE — 87449 NOS EACH ORGANISM AG IA: CPT

## 2020-10-14 PROCEDURE — 87505 NFCT AGENT DETECTION GI: CPT

## 2020-10-14 PROCEDURE — 94761 N-INVAS EAR/PLS OXIMETRY MLT: CPT

## 2020-10-14 PROCEDURE — 87324 CLOSTRIDIUM AG IA: CPT

## 2020-10-14 PROCEDURE — C9113 INJ PANTOPRAZOLE SODIUM, VIA: HCPCS | Performed by: HOSPITALIST

## 2020-10-14 PROCEDURE — 74177 CT ABD & PELVIS W/CONTRAST: CPT

## 2020-10-14 PROCEDURE — 6360000002 HC RX W HCPCS: Performed by: HOSPITALIST

## 2020-10-14 PROCEDURE — 99285 EMERGENCY DEPT VISIT HI MDM: CPT

## 2020-10-14 PROCEDURE — 6360000002 HC RX W HCPCS: Performed by: NURSE PRACTITIONER

## 2020-10-14 PROCEDURE — 80053 COMPREHEN METABOLIC PANEL: CPT

## 2020-10-14 PROCEDURE — 96375 TX/PRO/DX INJ NEW DRUG ADDON: CPT

## 2020-10-14 PROCEDURE — 85652 RBC SED RATE AUTOMATED: CPT

## 2020-10-14 PROCEDURE — 93005 ELECTROCARDIOGRAM TRACING: CPT

## 2020-10-14 PROCEDURE — 6370000000 HC RX 637 (ALT 250 FOR IP): Performed by: HOSPITALIST

## 2020-10-14 PROCEDURE — 2580000003 HC RX 258: Performed by: NURSE PRACTITIONER

## 2020-10-14 PROCEDURE — 2580000003 HC RX 258: Performed by: HOSPITALIST

## 2020-10-14 PROCEDURE — 83630 LACTOFERRIN FECAL (QUAL): CPT

## 2020-10-14 PROCEDURE — G0328 FECAL BLOOD SCRN IMMUNOASSAY: HCPCS

## 2020-10-14 PROCEDURE — 96376 TX/PRO/DX INJ SAME DRUG ADON: CPT

## 2020-10-14 PROCEDURE — 36415 COLL VENOUS BLD VENIPUNCTURE: CPT

## 2020-10-14 PROCEDURE — 85025 COMPLETE CBC W/AUTO DIFF WBC: CPT

## 2020-10-14 PROCEDURE — 86140 C-REACTIVE PROTEIN: CPT

## 2020-10-14 PROCEDURE — 81001 URINALYSIS AUTO W/SCOPE: CPT

## 2020-10-14 PROCEDURE — 83690 ASSAY OF LIPASE: CPT

## 2020-10-14 PROCEDURE — 96374 THER/PROPH/DIAG INJ IV PUSH: CPT

## 2020-10-14 PROCEDURE — 87086 URINE CULTURE/COLONY COUNT: CPT

## 2020-10-14 RX ORDER — ONDANSETRON 2 MG/ML
4 INJECTION INTRAMUSCULAR; INTRAVENOUS EVERY 6 HOURS PRN
Status: DISCONTINUED | OUTPATIENT
Start: 2020-10-14 | End: 2020-10-15 | Stop reason: HOSPADM

## 2020-10-14 RX ORDER — ZOLPIDEM TARTRATE 5 MG/1
10 TABLET ORAL NIGHTLY PRN
Status: DISCONTINUED | OUTPATIENT
Start: 2020-10-14 | End: 2020-10-15 | Stop reason: HOSPADM

## 2020-10-14 RX ORDER — ROSUVASTATIN CALCIUM 10 MG/1
20 TABLET, COATED ORAL NIGHTLY
Status: DISCONTINUED | OUTPATIENT
Start: 2020-10-14 | End: 2020-10-15 | Stop reason: HOSPADM

## 2020-10-14 RX ORDER — ACETAMINOPHEN 325 MG/1
650 TABLET ORAL EVERY 6 HOURS PRN
Status: DISCONTINUED | OUTPATIENT
Start: 2020-10-14 | End: 2020-10-15 | Stop reason: HOSPADM

## 2020-10-14 RX ORDER — QUETIAPINE FUMARATE 25 MG/1
25 TABLET, FILM COATED ORAL NIGHTLY
Status: DISCONTINUED | OUTPATIENT
Start: 2020-10-14 | End: 2020-10-15 | Stop reason: HOSPADM

## 2020-10-14 RX ORDER — 0.9 % SODIUM CHLORIDE 0.9 %
1000 INTRAVENOUS SOLUTION INTRAVENOUS ONCE
Status: COMPLETED | OUTPATIENT
Start: 2020-10-14 | End: 2020-10-14

## 2020-10-14 RX ORDER — SODIUM CHLORIDE 9 MG/ML
INJECTION, SOLUTION INTRAVENOUS CONTINUOUS
Status: ACTIVE | OUTPATIENT
Start: 2020-10-14 | End: 2020-10-15

## 2020-10-14 RX ORDER — ACETAMINOPHEN 500 MG
1000 TABLET ORAL EVERY 6 HOURS PRN
Status: DISCONTINUED | OUTPATIENT
Start: 2020-10-14 | End: 2020-10-15 | Stop reason: HOSPADM

## 2020-10-14 RX ORDER — SODIUM CHLORIDE 0.9 % (FLUSH) 0.9 %
10 SYRINGE (ML) INJECTION PRN
Status: DISCONTINUED | OUTPATIENT
Start: 2020-10-14 | End: 2020-10-15 | Stop reason: HOSPADM

## 2020-10-14 RX ORDER — ACETAMINOPHEN 650 MG/1
650 SUPPOSITORY RECTAL EVERY 6 HOURS PRN
Status: DISCONTINUED | OUTPATIENT
Start: 2020-10-14 | End: 2020-10-15 | Stop reason: HOSPADM

## 2020-10-14 RX ORDER — POTASSIUM CHLORIDE 7.45 MG/ML
10 INJECTION INTRAVENOUS PRN
Status: DISCONTINUED | OUTPATIENT
Start: 2020-10-14 | End: 2020-10-15 | Stop reason: HOSPADM

## 2020-10-14 RX ORDER — ONDANSETRON 2 MG/ML
4 INJECTION INTRAMUSCULAR; INTRAVENOUS ONCE
Status: COMPLETED | OUTPATIENT
Start: 2020-10-14 | End: 2020-10-14

## 2020-10-14 RX ORDER — POTASSIUM CHLORIDE 20 MEQ/1
40 TABLET, EXTENDED RELEASE ORAL PRN
Status: DISCONTINUED | OUTPATIENT
Start: 2020-10-14 | End: 2020-10-15 | Stop reason: HOSPADM

## 2020-10-14 RX ORDER — MORPHINE SULFATE 4 MG/ML
4 INJECTION, SOLUTION INTRAMUSCULAR; INTRAVENOUS ONCE
Status: COMPLETED | OUTPATIENT
Start: 2020-10-14 | End: 2020-10-14

## 2020-10-14 RX ORDER — PANTOPRAZOLE SODIUM 40 MG/10ML
40 INJECTION, POWDER, LYOPHILIZED, FOR SOLUTION INTRAVENOUS DAILY
Status: DISCONTINUED | OUTPATIENT
Start: 2020-10-14 | End: 2020-10-15 | Stop reason: HOSPADM

## 2020-10-14 RX ORDER — NITROGLYCERIN 0.4 MG/1
0.4 TABLET SUBLINGUAL EVERY 5 MIN PRN
Status: DISCONTINUED | OUTPATIENT
Start: 2020-10-14 | End: 2020-10-15 | Stop reason: HOSPADM

## 2020-10-14 RX ORDER — METOPROLOL SUCCINATE 50 MG/1
25 TABLET, EXTENDED RELEASE ORAL DAILY
Status: DISCONTINUED | OUTPATIENT
Start: 2020-10-14 | End: 2020-10-15 | Stop reason: HOSPADM

## 2020-10-14 RX ORDER — MORPHINE SULFATE 4 MG/ML
4 INJECTION, SOLUTION INTRAMUSCULAR; INTRAVENOUS EVERY 4 HOURS PRN
Status: DISCONTINUED | OUTPATIENT
Start: 2020-10-14 | End: 2020-10-15

## 2020-10-14 RX ORDER — ONDANSETRON 4 MG/1
4 TABLET, ORALLY DISINTEGRATING ORAL EVERY 8 HOURS PRN
Status: DISCONTINUED | OUTPATIENT
Start: 2020-10-14 | End: 2020-10-15 | Stop reason: HOSPADM

## 2020-10-14 RX ORDER — MAGNESIUM SULFATE 1 G/100ML
1 INJECTION INTRAVENOUS PRN
Status: DISCONTINUED | OUTPATIENT
Start: 2020-10-14 | End: 2020-10-15 | Stop reason: HOSPADM

## 2020-10-14 RX ORDER — MORPHINE SULFATE 2 MG/ML
2 INJECTION, SOLUTION INTRAMUSCULAR; INTRAVENOUS EVERY 4 HOURS PRN
Status: DISCONTINUED | OUTPATIENT
Start: 2020-10-14 | End: 2020-10-15

## 2020-10-14 RX ORDER — CLOPIDOGREL BISULFATE 75 MG/1
75 TABLET ORAL DAILY
Status: DISCONTINUED | OUTPATIENT
Start: 2020-10-14 | End: 2020-10-15 | Stop reason: HOSPADM

## 2020-10-14 RX ORDER — RANOLAZINE 500 MG/1
500 TABLET, EXTENDED RELEASE ORAL 2 TIMES DAILY
Status: DISCONTINUED | OUTPATIENT
Start: 2020-10-14 | End: 2020-10-15 | Stop reason: HOSPADM

## 2020-10-14 RX ORDER — ASPIRIN 81 MG/1
81 TABLET ORAL DAILY
Status: DISCONTINUED | OUTPATIENT
Start: 2020-10-14 | End: 2020-10-15 | Stop reason: HOSPADM

## 2020-10-14 RX ADMIN — ASPIRIN 81 MG: 81 TABLET, COATED ORAL at 18:41

## 2020-10-14 RX ADMIN — SODIUM CHLORIDE 1000 ML: 9 INJECTION, SOLUTION INTRAVENOUS at 13:25

## 2020-10-14 RX ADMIN — CLOPIDOGREL BISULFATE 75 MG: 75 TABLET ORAL at 18:41

## 2020-10-14 RX ADMIN — RANOLAZINE 500 MG: 500 TABLET, FILM COATED, EXTENDED RELEASE ORAL at 21:45

## 2020-10-14 RX ADMIN — MORPHINE SULFATE 2 MG: 2 INJECTION, SOLUTION INTRAMUSCULAR; INTRAVENOUS at 21:46

## 2020-10-14 RX ADMIN — ZOLPIDEM TARTRATE 10 MG: 5 TABLET ORAL at 21:45

## 2020-10-14 RX ADMIN — IOPAMIDOL 75 ML: 755 INJECTION, SOLUTION INTRAVENOUS at 13:55

## 2020-10-14 RX ADMIN — QUETIAPINE FUMARATE 25 MG: 25 TABLET ORAL at 21:45

## 2020-10-14 RX ADMIN — Medication 10 ML: at 18:42

## 2020-10-14 RX ADMIN — ROSUVASTATIN 20 MG: 10 TABLET, FILM COATED ORAL at 21:45

## 2020-10-14 RX ADMIN — Medication 10 ML: at 18:47

## 2020-10-14 RX ADMIN — ONDANSETRON 4 MG: 2 INJECTION INTRAMUSCULAR; INTRAVENOUS at 13:26

## 2020-10-14 RX ADMIN — MORPHINE SULFATE 4 MG: 4 INJECTION, SOLUTION INTRAMUSCULAR; INTRAVENOUS at 13:29

## 2020-10-14 RX ADMIN — PANTOPRAZOLE SODIUM 40 MG: 40 INJECTION, POWDER, FOR SOLUTION INTRAVENOUS at 18:42

## 2020-10-14 RX ADMIN — METOPROLOL SUCCINATE 25 MG: 50 TABLET, EXTENDED RELEASE ORAL at 18:42

## 2020-10-14 RX ADMIN — SODIUM CHLORIDE: 9 INJECTION, SOLUTION INTRAVENOUS at 18:42

## 2020-10-14 ASSESSMENT — PAIN DESCRIPTION - PAIN TYPE
TYPE: ACUTE PAIN

## 2020-10-14 ASSESSMENT — PAIN DESCRIPTION - LOCATION
LOCATION: ABDOMEN

## 2020-10-14 ASSESSMENT — PAIN SCALES - GENERAL
PAINLEVEL_OUTOF10: 6
PAINLEVEL_OUTOF10: 4
PAINLEVEL_OUTOF10: 10
PAINLEVEL_OUTOF10: 5
PAINLEVEL_OUTOF10: 5

## 2020-10-14 NOTE — H&P
HOSPITALISTS HISTORY AND PHYSICAL    10/14/2020 4:02 PM    Patient Information:  Jose Rafael Ortega is a 52 y.o. male [id number]  PCP:  Marylou Foster MD (Tel: [phone] )    Chief complaint:    Chief Complaint   Patient presents with    Abdominal Pain     vomiting fri night to sat. sat. started with diarrhea. c/o abd pain    Emesis    Diarrhea        History of Present Illness:  John Gamble is a 52 y.o. male who presented to the ED to be evaluated for a 3-day history of nausea, vomiting, and diarrhea in addition to have acute abdominal pain. The patient reports that he has had similar episodes in the past but they had resolved spontaneously. Due to degree of discomfort stat CT of the abdomen pelvis was obtained and notable for terminal ileitis and colitis. Consult placed to GI by ED attending who recommended collection of stool studies/cultures with overnight observation. GI specifically requested that antibiotics NOT be administered until after a.m. consultation with specialist.  Of note, patient does not have a history of IBD, and has never been told he has Crohn's disease. History obtained from patient and epic chart        REVIEW OF SYSTEMS:   Constitutional: Negative for fever,chills or night sweats  ENT: Negative for rhinorrhea, epistaxis, hoarseness, sore throat. Respiratory: Negative for shortness of breath,wheezing  Cardiovascular: Negative for chest pain, palpitations   Gastrointestinal:  positive  for nausea, vomiting, diarrhea and diffuse abdominal pain   genitourinary: Negative for polyuria, dysuria   Hematologic/Lymphatic: Negative for bleeding tendency, easy bruising  Musculoskeletal:  chronic myalgias and arthralgias  Neurologic: Negative for confusion,dysarthria. Skin: Negative for itching,rash  Psychiatric: Negative for depression,anxiety, agitation.   Endocrine: Negative for polydipsia,polyuria,heat /cold intolerance. Past Medical History:   has a past medical history of CAD (coronary artery disease), Chest pain, Chronic pain syndrome, Current smoker, DDD (degenerative disc disease), lumbar, Facet joint syndrome, High cholesterol, Hypertension, IgA nephropathy, Insomnia, Neuropathic pain, NSTEMI (non-ST elevated myocardial infarction) ([facility] [address] and Pain management contract agreement. Past Surgical History:   has a past surgical history that includes Percutaneous Transluminal Coronary Angio (10/15/2018); Dental surgery; Coronary angioplasty (06/04/2019); bronchoscopy (N/A, 8/21/2019); and bronchoscopy (8/21/2019). Medications:  No current facility-administered medications on file prior to encounter. Current Outpatient Medications on File Prior to Encounter   Medication Sig Dispense Refill    Ondansetron HCl (ZOFRAN PO) Take by mouth      metoprolol succinate (TOPROL XL) 25 MG extended release tablet TAKE ONE TABLET BY MOUTH DAILY 30 tablet 5    ranolazine (RANEXA) 500 MG extended release tablet Take 1 tablet by mouth 2 times daily 60 tablet 5    clopidogrel (PLAVIX) 75 MG tablet TAKE ONE TABLET BY MOUTH DAILY 90 tablet 1    ASPIRIN LOW DOSE 81 MG chewable tablet CHEW AND SWALLOW ONE TABLET BY MOUTH DAILY 90 tablet 3    pantoprazole (PROTONIX) 40 MG tablet Take 40 mg by mouth daily      QUEtiapine (SEROQUEL) 25 MG tablet TK 1 T PO QPM  5    albuterol sulfate HFA (VENTOLIN HFA) 108 (90 Base) MCG/ACT inhaler Inhale 2 puffs into the lungs every 6 hours as needed for Wheezing (Patient not taking: Reported on 4/23/2020) 1 Inhaler 3    ALPRAZolam (XANAX) 0.5 MG tablet Take 0.5 mg by mouth nightly as needed for Sleep.  oxyCODONE-acetaminophen (PERCOCET) 7.5-325 MG per tablet Take 1 tablet by mouth 4 times daily as needed for Pain.       rosuvastatin (CRESTOR) 20 MG tablet Take 20 mg by mouth nightly      zolpidem (AMBIEN) 10 MG tablet Take 10 mg by mouth nightly as needed for Sleep.  ranolazine (RANEXA) 500 MG extended release tablet Take 500 mg by mouth 2 times daily      nitroGLYCERIN (NITROSTAT) 0.4 MG SL tablet up to max of 3 total doses. If no relief after 1 dose, call 911. 75 tablet 3       Allergies: Allergies   Allergen Reactions    Penicillins         Social History:  Patient Lives  reports that he quit smoking about 2 years ago. His smoking use included cigarettes. He has a 10.00 pack-year smoking history. He has never used smokeless tobacco. He reports that he does not drink alcohol or use drugs. Family History:  family history includes High Blood Pressure in his daughter and mother. Physical Exam:  /71   Pulse 74   Temp 98 °F (36.7 °C) (Oral)   Resp 20   Ht 5' 8\" (1.727 m)   Wt 187 lb (84.8 kg)   SpO2 100%   BMI 28.43 kg/m²     General appearance:  Appears uncomfortable. Well nourished  Eyes: Sclera clear, pupils equal  ENT: Moist mucus membranes, no thrush. Trachea midline. Cardiovascular: Regular rhythm, normal S1, S2. No murmur, gallop, rub. No edema in lower extremities  Respiratory: Clear to auscultation bilaterally, no wheeze, good inspiratory effort  Gastrointestinal: Abdomen with diffuse tenderness in bilateral lower quadrants; no rebound  Musculoskeletal: No cyanosis in digits, neck supple  Neurology: Cranial nerves grossly intact. Alert and oriented in time, place and person. No speech or motor deficits  Psychiatry: Appropriate affect.  Not agitated  Skin: Warm, dry, normal turgor, no rash  Brisk capillary refill, peripheral pulses palpable   Labs:  CBC:   Lab Results   Component Value Date    WBC 6.8 10/14/2020    RBC 5.02 10/14/2020    HGB 14.8 10/14/2020    HCT 43.3 10/14/2020    MCV 86.3 10/14/2020    MCH 29.4 10/14/2020    MCHC 34.1 10/14/2020    RDW 13.4 10/14/2020     10/14/2020    MPV 8.1 10/14/2020     BMP:    Lab Results   Component Value Date     10/14/2020    K 4.4 10/14/2020    K 4.5 08/26/2019     10/14/2020    CO2 24 10/14/2020    BUN 16 10/14/2020    CREATININE 1.3 10/14/2020    CALCIUM 9.4 10/14/2020    GFRAA >60 10/14/2020    GFRAA >60 05/07/2013    LABGLOM 59 10/14/2020    GLUCOSE 116 10/14/2020     CT ABDOMEN PELVIS W IV CONTRAST Additional Contrast? None   Preliminary Result   1. Diffuse colonic wall thickening and terminal ileal thickening, suggesting   colitis and ileitis. Consider direct visualization once the patient's   condition permits to assess for possible Crohn's disease. 2.  Multiple small mesenteric lymph nodes are likely reactive. No definite   lymphadenopathy. 3.  Tiny fat containing left inguinal hernia. Chest Xray:   EKG:    Ordered by hospitalist team with results pending    Discussed case  with ED attending    Problem List  Principal Problem:    Terminal ileitis of small intestine ([address]  Active Problems:    HTN (hypertension)    Chronic pain syndrome    Coronary artery disease involving native coronary artery of native heart with unstable angina pectoris (HCC)    Nausea vomiting and diarrhea    Terminal ileitis of small intestine, other complication ([address]  Resolved Problems:    * No resolved hospital problems.  *        Assessment/Plan:     N/V/D with ileitis/colitis per CT- blood, urine and stool cultures collected in addition to fecal leukocytes, C. difficile toxin, and stool Hemoccult; GI specifically requested no antibiotics be initiated prior to a.m. specialist consultation; supportive care with gentle IVF, anti-emetics, and judicious use of IV narcotics; CRP and ESR added to a.m. labs    Chronic pain syndrome/ insomnia- oral opiates placed on hold due to N/V necessitating parenteral IV MSO4; continue home dose of Ambien however HS Xanax placed on hold to avoid polypharmacy with sedation    Hypertension- adequately controlled therefore continue home antihypertensives    CAD-continue medical therapy including statin, Ranexa, and oral antihypertensives    GI prophylaxis- Protonix PPI switch to IV during stay      DVT prophylaxis-SCDs due to bleeding risk from DAPT  Code status-full code  Diet-clear liquid diet; n.p.o. after midnight  IV access-PIV established in ED      Admit as observation. I anticipate hospitalization less than two midnights for investigation and treatment of the above medically necessary diagnoses. Please note that some part of this chart was generated using Dragon dictation software. Although every effort was made to ensure the accuracy of this automated transcription, some errors in transcription may have occurred inadvertently. If you may need any clarification, please do not hesitate to contact me through [facility].        Bertrand Pruett MD    10/14/2020 4:02 PM

## 2020-10-14 NOTE — ED NOTES
Pt ambulated to bathroom on own without difficulty in attempt to provide urine specimen     Terrie Short RN  10/14/20 [id number]

## 2020-10-14 NOTE — PROGRESS NOTES

## 2020-10-14 NOTE — ED PROVIDER NOTES
I independently performed a history and physical on Cassandra Bueno. All diagnostic, treatment, and disposition decisions were made by myself in conjunction with the advanced practice provider.     -Cassandra Bueno is a 52 y.o. male presents to ED for abdominal pain. Patient reports dull pain, LLQ, constant, nonradiating, improved with taking zofran. Has been taking tylenol without much improvement. Reports diarrhea that started on Friday, has been taking imodium, beptobismol without much improvement. Emesis that started Friday night and resolved by the next day and returned last night (last episode was 6am this morning). Denies fever, cp, sob.   -PE: well appearing, nontoxic, not in acute distress. RRR, CTAB/l, no w/r/r, abdomen soft, ND, +LLQ tenderness, + BS x 4, no rigidity, rebound, guarding  -lab workup significant for: wnl  -CT a/p: Diffuse colonic wall thickening and terminal ileal thickening, suggesting colitis and ileitis. Consider direct visualization once the patient condition permits to assess for possible Crohn's disease. Multiple small mesenteric lymph nodes are likely reactive but no definite lymphadenopathy. 25 containing left inguinal hernia.  -Ua: negative nitrite, leukocyte esterase, 3-5 wbc, 2-5 epitheilial cells   -was given morphine, zofran and IVF bolus  -GI consulted, Dr Faith Marquez recommends admission for further workup and possible colonoscopy. -Plan for admission for further workup and treatment discussed with patient who is in agreement with plan and have no further questions/concerns    For further details of [facility] emergency department encounter, please see LILY Villalta's documentation.         Colleen Aggarwal MD  10/14/20 [id number]

## 2020-10-14 NOTE — ED NOTES

## 2020-10-14 NOTE — ED PROVIDER NOTES
[facility] Emergency Department    CHIEF COMPLAINT  Abdominal Pain (vomiting fri night to sat. sat. started with diarrhea. c/o abd pain); Emesis; and Diarrhea      SHARED SERVICE VISIT  I have seen and evaluated this patient with my supervising physician, Dr. Jennifer English. HISTORY OF PRESENT ILLNESS  Gustavo Milligan is a 52 y.o. male who presents to the ED complaining of acute onset of \"sharp\" 7/10 nonradiating mid abdominal pain which onset Friday accompanied by nausea and vomiting all night and resolved on Saturday which is when he began to experience diarrhea. Reports vomiting x8 and diarrhea x2 today. His symptoms are not associated with meals. States that his pain is alleviated by vomiting and diarrhea but after approximately 25 minutes pain begins to come back. He denies hematochezia, hematemesis, fever, chills or sweats, and sick exposures. He does however endorse urinary urgency with urinary incontinence over the last 2 days. No other complaints, modifying factors or associated symptoms. Nursing notes reviewed.    Past Medical History:   Diagnosis Date    CAD (coronary artery disease) 10/13/2018    MI and + Troponins    Chest pain 10/13/2018    + Troponins    Chronic pain syndrome     Current smoker     Quit 10/13/2018    DDD (degenerative disc disease), lumbar     Facet joint syndrome     High cholesterol     Hypertension     (does not take meds per wife as instructed)     IgA nephropathy     Insomnia     Neuropathic pain     NSTEMI (non-ST elevated myocardial infarction) ([facility] [address] 10/13/2018    + Troponins, PTCA    Pain management contract agreement     with Dr. Mita Ritter     Past Surgical History:   Procedure Laterality Date    BRONCHOSCOPY N/A 8/21/2019    BRONCHOSCOPY ALVEOLAR LAVAGE performed by Boogie Alcala MD at [address]  8/21/2019    BRONCHOSCOPY THERAPUTIC ASPIRATION INITIAL performed by Boogie Alcala MD at [facility] ENDOSCOPY    CORONARY ANGIOPLASTY  2019    POBA and cutting balloon to Diag 1   [address]      now dentures upper and lower    PTCA  10/15/2018    BMS- 2.25 x 18 to Diag 1     Family History   Problem Relation Age of Onset    High Blood Pressure Mother     High Blood Pressure Daughter      Social History     Socioeconomic History    Marital status:      Spouse name: Not on file    Number of children: Not on file    Years of education: Not on file    Highest education level: Not on file   Occupational History    Not on file   Social Needs    Financial resource strain: Not on file    Food insecurity     Worry: Not on file     Inability: Not on file    Transportation needs     Medical: Not on file     Non-medical: Not on file   Tobacco Use    Smoking status: Former Smoker     Packs/day: 1.00     Years: 10.00     Pack years: 10.00     Types: Cigarettes     Last attempt to quit: 10/13/2018     Years since quittin.0    Smokeless tobacco: Never Used   Substance and Sexual Activity    Alcohol use: No    Drug use: No    Sexual activity: Yes   Lifestyle    Physical activity     Days per week: Not on file     Minutes per session: Not on file    Stress: Not on file   Relationships    Social connections     Talks on phone: Not on file     Gets together: Not on file     Attends [employer] service: Not on file     Active member of club or organization: Not on file     Attends meetings of clubs or organizations: Not on file     Relationship status: Not on file    Intimate partner violence     Fear of current or ex partner: Not on file     Emotionally abused: Not on file     Physically abused: Not on file     Forced sexual activity: Not on file   Other Topics Concern    Not on file   Social History Narrative    Not on file     No current facility-administered medications for this encounter.       Current Outpatient Medications   Medication Sig Dispense Refill    Ondansetron HCl (ZOFRAN PO) Take by mouth      metoprolol succinate (TOPROL XL) 25 MG extended release tablet TAKE ONE TABLET BY MOUTH DAILY 30 tablet 5    ranolazine (RANEXA) 500 MG extended release tablet Take 1 tablet by mouth 2 times daily 60 tablet 5    clopidogrel (PLAVIX) 75 MG tablet TAKE ONE TABLET BY MOUTH DAILY 90 tablet 1    ASPIRIN LOW DOSE 81 MG chewable tablet CHEW AND SWALLOW ONE TABLET BY MOUTH DAILY 90 tablet 3    pantoprazole (PROTONIX) 40 MG tablet Take 40 mg by mouth daily      QUEtiapine (SEROQUEL) 25 MG tablet TK 1 T PO QPM  5    albuterol sulfate HFA (VENTOLIN HFA) 108 (90 Base) MCG/ACT inhaler Inhale 2 puffs into the lungs every 6 hours as needed for Wheezing (Patient not taking: Reported on 4/23/2020) 1 Inhaler 3    ALPRAZolam (XANAX) 0.5 MG tablet Take 0.5 mg by mouth nightly as needed for Sleep.  oxyCODONE-acetaminophen (PERCOCET) 7.5-325 MG per tablet Take 1 tablet by mouth 4 times daily as needed for Pain.  rosuvastatin (CRESTOR) 20 MG tablet Take 20 mg by mouth nightly      zolpidem (AMBIEN) 10 MG tablet Take 10 mg by mouth nightly as needed for Sleep.  ranolazine (RANEXA) 500 MG extended release tablet Take 500 mg by mouth 2 times daily      nitroGLYCERIN (NITROSTAT) 0.4 MG SL tablet up to max of 3 total doses. If no relief after 1 dose, call 911. 68 tablet 3     Allergies   Allergen Reactions    Penicillins        REVIEW OF SYSTEMS  10 systems reviewed, pertinent positives per HPI otherwise noted to be negative    PHYSICAL EXAM  /88   Pulse 80   Temp 98 °F (36.7 °C) (Oral)   Resp 17   Ht 5' 8\" (1.727 m)   Wt 187 lb (84.8 kg)   SpO2 95%   BMI 28.43 kg/m²   GENERAL APPEARANCE: Awake and alert. Cooperative. Mild distress. Non-Toxic in appearance. HEAD: Normocephalic. Atraumatic. EYES: PERRL. EOM's grossly intact. ENT: Mucous membranes are moist.   NECK: Supple. HEART: RRR. No murmurs. LUNGS: Respirations unlabored. CTAB. Good air exchange.  Speaking comfortably in full sentences. ABDOMEN: Soft. Non-distended.  + Diffuse tenderness. No guarding or rebound. Negative McBurney point tenderness. Negative Rovsing, Snider's, and course signs. No midline pulsatile abdominal mass. No masses. No organomegaly. EXTREMITIES: No peripheral edema. Moves all extremities equally. All extremities neurovascularly intact. Equal pulses bilaterally. SKIN: Warm and dry. No acute rashes. NEUROLOGICAL: Alert and oriented. CN's 2-12 intact. No gross facial drooping. Strength 5/5, sensation intact. PSYCHIATRIC: Normal mood and affect. RADIOLOGY  No results found. ED COURSE  Patient received morphine for pain, with good relief. Triage vitals stable but hypertensive at 128/91. Labs Ordered:  Microscopic urinalysis: Mucus 3+, bacteria 2+  CMP: Glucose 116, GFR 59; otherwise unremarkable  Lipase: 59  Urinalysis: Bilirubin small, blood moderate, protein trace; otherwise unremarkable  CBC: Negative for leukocytosis or anemia; otherwise unremarkable  GI pathogens: + Campylobacter spp  Fecal leukocytes: Positive  Urine culture: Pending  CRP: 52.3  ESR: 4 mm/hr  Microscopic urinalysis: Mucus 3+, bacteria 2+      Imaging ordered:  CT abdomen pelvis: Diffuse colonic wall thickening and terminal ileal thickening, suggesting cholangitis but consider direct hospitalization once the patient's condition permits to assess for possible Crohn's disease. Multiple small mesenteric lymph nodes are likely reactive. No definitive lymphadenopathy. Tiny fat-containing left inguinal hernia. Interventions:  Patient was given a liter of normal saline, Zofran for nausea, and morphine for pain. Reevaluation:  Patient reports resolution of his nausea. Despite the fact that he was found sleeping upon EMD entry into the room patient denies improvement of his abdominal pain. Consulted Dr. Mccord-gastroenterologist. Discussed patient's HPI, ED work-up, results, and treatment.  Dr. Mitul Dwyer recommends observation admission. He does not want antibiotics to be started until after GI pathogens are resulted. A discussion was had with Mr. Osmar Park regarding colitis, ileitis, and intention to admit on the recommendation of GI-Dr. Craig Turcios. Risk management discussed and shared decision making had with patient and/or surrogate. All questions were answered. Patient is agreeable with this plan. CRITICAL CARE TIME  0 Minutes of critical care time spent not including separately billable procedures. MDM  Patient presents to emergency department with generalized abdominal pain concerning for colitis and ileitis. Alternate diagnoses are less likely based on history and physical. Patient presented emergency department with generalized abdominal pain. Alternate diagnoses are less likely based on history and physical. Considered UTI but less likely as urinalysis reveals no nitrites or leukocytes, nor are there urinary symptoms. Considered aortic aneurysm but less like as pain is nonradiating, there is no ripping or tearing sensation, patient is middle-age, there is no midline pulsatile abdominal mass and bilateral radial pulses are equal.  Considered perforated appendix but less likely as pain did not begin periumbilical, is afebrile, there is no leukocytosis, and CT Imaging reveals a normal appendix. After consultation with gastroenterology-Dr. Craig Turcios and conversation with my attending Deanna Woods patient will be admitted to the hospital for further evaluation and treatment. Clinical Impression:  Colitis  Ileitis      Disposition:  Admitted      Patient will be admitted to hospital for further evaluation and treatment.        Hospitalist: Dr. Prerna Romero gastroenterology: Dr. Craig Turcios    Discussed patients HPI, ED work-up, results, treatment, and response with my attending physician Dr. Deanna Woods and the Hospitalist -Dr. Dm Duncan who agrees to admit the patient to the hospital.           MARGUERITE Pierre - CNP  10/16/20 0521

## 2020-10-15 ENCOUNTER — TELEPHONE (OUTPATIENT)
Dept: GASTROENTEROLOGY | Age: 50
End: 2020-10-15

## 2020-10-15 VITALS
RESPIRATION RATE: 16 BRPM | BODY MASS INDEX: 29.45 KG/M2 | SYSTOLIC BLOOD PRESSURE: 119 MMHG | HEIGHT: 68 IN | HEART RATE: 62 BPM | TEMPERATURE: 97.9 F | OXYGEN SATURATION: 97 % | WEIGHT: 194.3 LBS | DIASTOLIC BLOOD PRESSURE: 74 MMHG

## 2020-10-15 LAB
A/G RATIO: 1.4 (ref 1.1–2.2)
ALBUMIN SERPL-MCNC: 3.8 G/DL (ref 3.4–5)
ALP BLD-CCNC: 73 U/L (ref 40–129)
ALT SERPL-CCNC: 18 U/L (ref 10–40)
ANION GAP SERPL CALCULATED.3IONS-SCNC: 8 MMOL/L (ref 3–16)
AST SERPL-CCNC: 18 U/L (ref 15–37)
BASOPHILS ABSOLUTE: 0.1 K/UL (ref 0–0.2)
BASOPHILS RELATIVE PERCENT: 1 %
BILIRUB SERPL-MCNC: 0.5 MG/DL (ref 0–1)
BUN BLDV-MCNC: 12 MG/DL (ref 7–20)
CALCIUM SERPL-MCNC: 8.8 MG/DL (ref 8.3–10.6)
CHLORIDE BLD-SCNC: 103 MMOL/L (ref 99–110)
CO2: 27 MMOL/L (ref 21–32)
CREAT SERPL-MCNC: 1.2 MG/DL (ref 0.9–1.3)
EKG ATRIAL RATE: 62 BPM
EKG DIAGNOSIS: NORMAL
EKG P AXIS: 32 DEGREES
EKG P-R INTERVAL: 152 MS
EKG Q-T INTERVAL: 414 MS
EKG QRS DURATION: 84 MS
EKG QTC CALCULATION (BAZETT): 420 MS
EKG R AXIS: 25 DEGREES
EKG T AXIS: 11 DEGREES
EKG VENTRICULAR RATE: 62 BPM
EOSINOPHILS ABSOLUTE: 0.2 K/UL (ref 0–0.6)
EOSINOPHILS RELATIVE PERCENT: 4.3 %
GFR AFRICAN AMERICAN: >60
GFR NON-AFRICAN AMERICAN: >60
GI BACTERIAL PATHOGENS BY PCR: ABNORMAL
GI BACTERIAL PATHOGENS BY PCR: ABNORMAL
GLOBULIN: 2.7 G/DL
GLUCOSE BLD-MCNC: 96 MG/DL (ref 70–99)
HCT VFR BLD CALC: 38.4 % (ref 40.5–52.5)
HEMOGLOBIN: 13.4 G/DL (ref 13.5–17.5)
LYMPHOCYTES ABSOLUTE: 1.3 K/UL (ref 1–5.1)
LYMPHOCYTES RELATIVE PERCENT: 24.1 %
MCH RBC QN AUTO: 29.6 PG (ref 26–34)
MCHC RBC AUTO-ENTMCNC: 35 G/DL (ref 31–36)
MCV RBC AUTO: 84.6 FL (ref 80–100)
MONOCYTES ABSOLUTE: 0.8 K/UL (ref 0–1.3)
MONOCYTES RELATIVE PERCENT: 15.9 %
NEUTROPHILS ABSOLUTE: 2.9 K/UL (ref 1.7–7.7)
NEUTROPHILS RELATIVE PERCENT: 54.7 %
ORGANISM: ABNORMAL
PDW BLD-RTO: 13.1 % (ref 12.4–15.4)
PLATELET # BLD: 156 K/UL (ref 135–450)
PMV BLD AUTO: 8.2 FL (ref 5–10.5)
POTASSIUM REFLEX MAGNESIUM: 4 MMOL/L (ref 3.5–5.1)
RBC # BLD: 4.53 M/UL (ref 4.2–5.9)
SODIUM BLD-SCNC: 138 MMOL/L (ref 136–145)
TOTAL PROTEIN: 6.5 G/DL (ref 6.4–8.2)
URINE CULTURE, ROUTINE: NORMAL
WBC # BLD: 5.3 K/UL (ref 4–11)

## 2020-10-15 PROCEDURE — 85025 COMPLETE CBC W/AUTO DIFF WBC: CPT

## 2020-10-15 PROCEDURE — C9113 INJ PANTOPRAZOLE SODIUM, VIA: HCPCS | Performed by: HOSPITALIST

## 2020-10-15 PROCEDURE — 6360000002 HC RX W HCPCS: Performed by: HOSPITALIST

## 2020-10-15 PROCEDURE — 6370000000 HC RX 637 (ALT 250 FOR IP): Performed by: HOSPITALIST

## 2020-10-15 PROCEDURE — 36415 COLL VENOUS BLD VENIPUNCTURE: CPT

## 2020-10-15 PROCEDURE — 80053 COMPREHEN METABOLIC PANEL: CPT

## 2020-10-15 PROCEDURE — G0378 HOSPITAL OBSERVATION PER HR: HCPCS

## 2020-10-15 PROCEDURE — 96376 TX/PRO/DX INJ SAME DRUG ADON: CPT

## 2020-10-15 PROCEDURE — 2580000003 HC RX 258: Performed by: HOSPITALIST

## 2020-10-15 RX ORDER — AZITHROMYCIN 500 MG/1
500 TABLET, FILM COATED ORAL DAILY
Qty: 3 TABLET | Refills: 0 | Status: SHIPPED | OUTPATIENT
Start: 2020-10-15 | End: 2020-10-18

## 2020-10-15 RX ADMIN — METOPROLOL SUCCINATE 25 MG: 50 TABLET, EXTENDED RELEASE ORAL at 10:12

## 2020-10-15 RX ADMIN — RANOLAZINE 500 MG: 500 TABLET, FILM COATED, EXTENDED RELEASE ORAL at 10:12

## 2020-10-15 RX ADMIN — ASPIRIN 81 MG: 81 TABLET, COATED ORAL at 10:12

## 2020-10-15 RX ADMIN — ACETAMINOPHEN 1000 MG: 500 TABLET ORAL at 06:24

## 2020-10-15 RX ADMIN — MORPHINE SULFATE 4 MG: 4 INJECTION, SOLUTION INTRAMUSCULAR; INTRAVENOUS at 15:10

## 2020-10-15 RX ADMIN — Medication 10 ML: at 10:13

## 2020-10-15 RX ADMIN — PANTOPRAZOLE SODIUM 40 MG: 40 INJECTION, POWDER, FOR SOLUTION INTRAVENOUS at 10:13

## 2020-10-15 RX ADMIN — CLOPIDOGREL BISULFATE 75 MG: 75 TABLET ORAL at 10:12

## 2020-10-15 ASSESSMENT — PAIN SCALES - GENERAL
PAINLEVEL_OUTOF10: 10
PAINLEVEL_OUTOF10: 8
PAINLEVEL_OUTOF10: 0

## 2020-10-15 NOTE — PROGRESS NOTES
Comprehensive Nutrition Assessment    Type and Reason for Visit:  Initial, Positive Nutrition Screen    Nutrition Recommendations/Plan:   1. Continue current diet. Further oral diet progression per GI  2. Encourage PO intakes as tolerated  3. Monitor nutrition adequacy, pertinent labs, bowel habits, wt changes, and clinical progress    Nutrition Assessment:  Positive screen for diarrhea. Pt admitted with abdominal pain, diarrhea, and emesis. History of CAD and HTN. Pt currently on clear liquid diet. Pt states he has not received any Ensure clear yet and that he \"wouldn't drink one even if he was brought one\". Complains that he is \"starving\" and wants to eat. Pt reports he has lost 6 lb since 10/09 when he started feeling ill. States he had no appetite at home and did not tolerate any food. At time of visit, pt had a can of coke at bedside and stated that he tolerated it well. Denied any C/S difficulty. Encouraged pt to consume what he could tolerate. Will monitor for further nutrition intervention. Malnutrition Assessment:  Malnutrition Status: At risk for malnutrition (Comment)    Context:  Acute Illness     Findings of the 6 clinical characteristics of malnutrition:  Weight Loss:  7 - Greater than 2% over 1 week       Estimated Daily Nutrient Needs:  Energy (kcal):  6222-9303 kcals/day; Weight Used for Energy Requirements:  Ideal(70 kg)     Protein (g):  70-84 g/day; Weight Used for Protein Requirements:  Ideal(1.0-1.2 g/kg)        Fluid (ml/day):  1 ml/kcal; Weight Used for Fluid Requirements:  [place]      Nutrition Related Findings:  Emesis, abdominal pain, diarhhea      Wounds:  None       Current Nutrition Therapies:    DIET CLEAR LIQUID;     Anthropometric Measures:  · Height: 5' 8\" (172.7 cm)  · Current Body Weight: 194 lb 4.8 oz (88.1 kg)   · Ideal Body Weight: 154 lbs; % Ideal Body Weight 126.2 %   · BMI: 29.6  · Adjusted Body Weight:   No Adjustment     · BMI Categories: Overweight (BMI

## 2020-10-15 NOTE — PLAN OF CARE
Problem: Falls - Risk of:  Goal: Will remain free from falls  Description: Will remain free from falls  10/15/2020 1324 by Gavino Araiza RN  Outcome: Ongoing

## 2020-10-15 NOTE — TELEPHONE ENCOUNTER
----- Message from Marie Maharaj MD sent at 10/15/2020  8:00 AM EDT -----  Regarding: This is a [facility] patient  The patient was seen by Nghia King before. Please reach the hospitalist taking care of the pt today and let him/her know this. My note is already there. I cannot see the patient of [facility] in GI consult under the current situation.

## 2020-10-15 NOTE — PROGRESS NOTES
Patient discharged per order. IV removed. Catheter intact. Dressing applied. Patient tolerated well. Notified [facility] and removed tele box. Reviewed discharge instructions with patient and his wife at bedside. Scripts for Zithromax were called into [facility] for the patient. Patient verbalized understanding. Patient discharged to private car with all belongings in stable condition.

## 2020-10-15 NOTE — PLAN OF CARE
Nutrition Problem #1: Inadequate oral intake  Intervention: Food and/or Nutrient Delivery: Continue Current Diet  Nutritional Goals: Consume >75% of meals

## 2020-10-15 NOTE — CONSULTS
Gastroenterology Consult Note    Patient:   Anna Ponce   :    1970   Facility:   [facility]   Referring/PCP: Simone Smith MD  Date:     10/15/2020  Consultant:   Uriel Garcia MD      Chief Complaint   Patient presents with    Abdominal Pain     vomiting fri night to sat. sat. started with diarrhea. c/o abd pain    Emesis    Diarrhea        History of Present illness     68-year-old male with a history of hypertension, hyperlipidemia, anxiety, IgA nephropathy, generalized anxiety disorder, chronic pain syndrome, degenerative disc disease, coronary artery disease, status post stent presented to ER with complaints of nausea, vomiting, diarrhea. His symptoms began acutely 5 days ago. He recalls eating chicken meal prior to the onset of symptoms. He denies any recent NSAIDs or antibiotics. His last EGD was in 2019 which showed esophagitis His last colonoscopy was in .   He denies any rectal bleeding, melena, and weight loss    Past Medical History:   Diagnosis Date    CAD (coronary artery disease) 10/13/2018    MI and + Troponins    Chest pain 10/13/2018    + Troponins    Chronic pain syndrome     Current smoker     Quit 10/13/2018    DDD (degenerative disc disease), lumbar     Facet joint syndrome     High cholesterol     Hypertension     (does not take meds per wife as instructed)     IgA nephropathy     Insomnia     Neuropathic pain     NSTEMI (non-ST elevated myocardial infarction) ([facility] [address] 10/13/2018    + Troponins, PTCA    Pain management contract agreement     with Dr. Jaya Adame     Past Surgical History:   Procedure Laterality Date    BRONCHOSCOPY N/A 2019    BRONCHOSCOPY ALVEOLAR LAVAGE performed by Heather Mitchell MD at [address] [place] [address]  2019    BRONCHOSCOPY THERAPUTIC ASPIRATION INITIAL performed by Heather [facility] MD at [facility]  2019    POBA and cutting balloon to Diag 1    DENTAL SURGERY      now dentures upper and lower    PTCA  10/15/2018    BMS- 2.25 x 18 to Diag 1       Social:   Social History     Tobacco Use    Smoking status: Former Smoker     Packs/day: 1.00     Years: 10.00     Pack years: 10.00     Types: Cigarettes     Last attempt to quit: 10/13/2018     Years since quittin.0    Smokeless tobacco: Never Used   Substance Use Topics    Alcohol use: No     Family:   Family History   Problem Relation Age of Onset    High Blood Pressure Mother     High Blood Pressure Daughter      No current facility-administered medications on file prior to encounter. Current Outpatient Medications on File Prior to Encounter   Medication Sig Dispense Refill    Ondansetron HCl (ZOFRAN PO) Take by mouth      metoprolol succinate (TOPROL XL) 25 MG extended release tablet TAKE ONE TABLET BY MOUTH DAILY 30 tablet 5    ranolazine (RANEXA) 500 MG extended release tablet Take 1 tablet by mouth 2 times daily 60 tablet 5    clopidogrel (PLAVIX) 75 MG tablet TAKE ONE TABLET BY MOUTH DAILY 90 tablet 1    ASPIRIN LOW DOSE 81 MG chewable tablet CHEW AND SWALLOW ONE TABLET BY MOUTH DAILY 90 tablet 3    pantoprazole (PROTONIX) 40 MG tablet Take 40 mg by mouth daily      QUEtiapine (SEROQUEL) 25 MG tablet TK 1 T PO QPM  5    albuterol sulfate HFA (VENTOLIN HFA) 108 (90 Base) MCG/ACT inhaler Inhale 2 puffs into the lungs every 6 hours as needed for Wheezing (Patient not taking: Reported on 2020) 1 Inhaler 3    ALPRAZolam (XANAX) 0.5 MG tablet Take 0.5 mg by mouth nightly as needed for Sleep.  oxyCODONE-acetaminophen (PERCOCET) 7.5-325 MG per tablet Take 1 tablet by mouth 4 times daily as needed for Pain.  rosuvastatin (CRESTOR) 20 MG tablet Take 20 mg by mouth nightly      zolpidem (AMBIEN) 10 MG tablet Take 10 mg by mouth nightly as needed for Sleep.  nitroGLYCERIN (NITROSTAT) 0.4 MG SL tablet up to max of 3 total doses.  If no relief after 1 dose, call 911. 25 tablet 3      Infusions:   PRN Medications: nitroGLYCERIN, zolpidem, sodium chloride flush, potassium chloride **OR** potassium alternative oral replacement **OR** potassium chloride, magnesium sulfate, acetaminophen **OR** acetaminophen, ondansetron **OR** ondansetron, acetaminophen  Allergies: Allergies   Allergen Reactions    Penicillins        ROS: Constitutional: negative for chills, fevers and sweats  Eyes: negative for cataracts, icterus and redness  Ears, nose, mouth, throat, and face: negative for epistaxis, hearing loss and sore throat  Respiratory: negative for cough, hemoptysis and sputum  Cardiovascular: negative for chest pain, dyspnea and lower extremity edema  Gastrointestinal: as per HPI  Genitourinary:negative for dysuria, frequency and hematuria  Neurological: negative for coordination problems, dizziness and gait problems  Behavioral/Psych: negative for anxiety, depression and mood swings    Physical Exam   /74   Pulse 62   Temp 97.9 °F (36.6 °C) (Oral)   Resp 16   Ht 5' 8\" (1.727 m)   Wt 194 lb 4.8 oz (88.1 kg)   SpO2 97%   BMI 29.54 kg/m²     General appearance: alert, appears stated age and cooperative  Head: Normocephalic, without obvious abnormality, atraumatic  Eyes: conjunctivae/corneas clear. PERRL, EOM's intact. Fundi benign.   Neck: no adenopathy and supple, symmetrical, trachea midline  Lungs: clear to auscultation bilaterally  Heart: regular rate and rhythm, S1, S2 normal, no murmur, click, rub or gallop  Abdomen: soft, non-tender; bowel sounds normal; no masses,  no organomegaly       Lab and Imaging Review   Labs:  CBC:   Recent Labs     10/14/20  1149 10/15/20  0721   WBC 6.8 5.3   HGB 14.8 13.4*   HCT 43.3 38.4*   MCV 86.3 84.6    156     BMP:   Recent Labs     10/14/20  1149 10/15/20  0721    138   K 4.4 4.0    103   CO2 24 27   BUN 16 12   CREATININE 1.3 1.2     LIVER PROFILE:   Recent Labs     10/14/20  1149 10/15/20  0721   AST 20 18 ALT 23 18   LIPASE 59.0  --    PROT 7.6 6.5   BILITOT 0.5 0.5   ALKPHOS 89 73     Stool:   GI Bacterial Pathogens By PCR Abnormal    10/14/2020  3:48 PM  [address] [facility]    No Shigella spp/EIEC DNA detected   No Shiga toxin-producing gene(s) detected   No Salmonella spp. DNA detected   Normal Range:  None detected    Organism Abnormal    10/14/2020  3:48 PM  [address] [facility]    Campylobacter spp(jejuni or coli) DNA        CT abd/pelvis:  1.  Diffuse colonic wall thickening and terminal ileal thickening, suggesting    colitis and ileitis.  Consider direct visualization once the patient's    condition permits to assess for possible Crohn's disease.         2.  Multiple small mesenteric lymph nodes are likely reactive.  No definite    lymphadenopathy.         3.  Tiny fat containing left inguinal hernia. Assessment:     79-year-old male with a history of hypertension, hyperlipidemia, anxiety, IgA nephropathy, generalized anxiety disorder, chronic pain syndrome, degenerative disc disease, coronary artery disease, status post stent admitted with campylobacter infection    Plan:   1. Continue supportive care  2. Encourage nutrition  3. Aggressive hydration  4. Conservative management  5. May not need antibiotics  6. Advance to low fiber/low fat diet  7. Avoid NSAIDs  8. OK to d.c from GI standpoint  9.  Follow up in clinic upon discharge      Troy Bey MD  7:12 PM 10/15/2020

## 2020-10-15 NOTE — RESEARCH
I was called yesterday afternoon to see the patient in GI consult. I saw the patient briefly. Researching the EMR showed that the patient has been seen by a [facility] doctor about a year ago. Dr. Mariza Beaver was informed about this by me in person and requested to call [facility]. She acknowledged that I am not the right gastroenterologist to be called for this patient and agreed to contact a [facility]. I have no patient-physician relationship between this patient and me at this point. A [facility] doctor should be called as agreed upon with Dr. Mariza Beaver.

## 2020-10-18 LAB
BLOOD CULTURE, ROUTINE: NORMAL
CULTURE, BLOOD 2: NORMAL

## 2020-12-07 RX ORDER — CLOPIDOGREL BISULFATE 75 MG/1
TABLET ORAL
Qty: 30 TABLET | Refills: 3 | Status: SHIPPED | OUTPATIENT
Start: 2020-12-07 | End: 2021-04-08

## 2021-03-08 RX ORDER — METOPROLOL SUCCINATE 25 MG/1
TABLET, EXTENDED RELEASE ORAL
Qty: 30 TABLET | Refills: 4 | Status: SHIPPED | OUTPATIENT
Start: 2021-03-08 | End: 2021-03-11

## 2021-03-11 RX ORDER — METOPROLOL SUCCINATE 25 MG/1
TABLET, EXTENDED RELEASE ORAL
Qty: 30 TABLET | Refills: 4 | Status: SHIPPED | OUTPATIENT
Start: 2021-03-11 | End: 2022-01-28 | Stop reason: SDUPTHER

## 2021-04-08 RX ORDER — CLOPIDOGREL BISULFATE 75 MG/1
TABLET ORAL
Qty: 30 TABLET | Refills: 2 | Status: SHIPPED | OUTPATIENT
Start: 2021-04-08 | End: 2021-04-15

## 2021-04-15 ENCOUNTER — TELEPHONE (OUTPATIENT)
Dept: CARDIOLOGY CLINIC | Age: 51
End: 2021-04-15

## 2021-04-15 RX ORDER — CLOPIDOGREL BISULFATE 75 MG/1
TABLET ORAL
Qty: 30 TABLET | Refills: 1 | Status: SHIPPED | OUTPATIENT
Start: 2021-04-15 | End: 2021-09-10

## 2021-04-27 NOTE — PROGRESS NOTES
Hospitalist Progress Note      PCP: Vicki Bermeo MD    Date of Admission: 8/18/2019    Chief Complaint: Nausea, vomiting, diarrhea, fever and cough    Hospital Course:   53 yo male who presented to [facility] ED with complaints of nausea, vomiting and diarrhea that started earlier today, severe intensity, multiple episodes. Also complains of shortness of breath, cough with whitish sputum, subjective fevers and night sweats since the last 3 days. Pt reports he feels like he has the flu, also has body aches and decreased appetite. Diarrhea is watery, nonbloody and nonmucoid. Vomitus is nonbilious, no hematemesis. Pt does report being exposed to a sick coworker. No recent travel. Also complains of nasal congestion and headaches. Subjective: Improved respiratory status. Less cough. SLP eval and MBS yesterday showed silent aspiration; continued on dysphagia diet with HTL. He has no appetite today and does not like the HTL.      Medications:  Reviewed    Infusion Medications    dextrose       Scheduled Medications    insulin lispro  0-6 Units Subcutaneous TID WC    insulin lispro  0-3 Units Subcutaneous Nightly    pantoprazole  40 mg Oral QAM AC    vancomycin  750 mg Intravenous Q8H    methylPREDNISolone  40 mg Intravenous Q12H    cefepime  2 g Intravenous Q12H    lidocaine 1 % injection  5 mL Intradermal Once    mupirocin   Nasal BID    ranolazine  500 mg Oral BID    rosuvastatin  20 mg Oral QPM    sodium chloride flush  10 mL Intravenous 2 times per day    enoxaparin  40 mg Subcutaneous Daily    aspirin  81 mg Oral Nightly    clopidogrel  75 mg Oral Nightly     PRN Meds: zolpidem, glucose, dextrose, glucagon (rDNA), dextrose, hydrOXYzine, oxyCODONE-acetaminophen, sodium chloride flush, magnesium hydroxide, ondansetron, acetaminophen      Intake/Output Summary (Last 24 hours) at 8/24/2019 1323  Last data filed at 8/24/2019 1200  Gross per 24 hour   Intake 500 ml   Output 2465 ml   Net -1965 ml Occupational Therapy    Orders received, chart reviewed and patient evaluated by occupational therapy. Pending progression with skilled acute occupational therapy, recommend:  No skilled occupational therapy/ follow up rehabilitation needs identified at this time. Recommend with nursing ADLs with supervision/setup, OOB to chair 3x/day and toileting via functional mobility to and from bathroom CGA and walker. Thank you for completing as able in order to maintain patient strength, endurance and independence. Full evaluation to follow.      Thank you,  Sarah Walton, OT

## 2021-05-11 RX ORDER — ASPIRIN 81 MG
TABLET,CHEWABLE ORAL
Qty: 30 TABLET | Refills: 5 | Status: SHIPPED | OUTPATIENT
Start: 2021-05-11

## 2021-05-12 NOTE — PROGRESS NOTES
[address] [facility]   Cardiovascular Evaluation    PATIENT: Mariya Julio  DATE: 2021  MRN: <[id number]>  CSN: [id number]  : 1970      Primary Care Doctor: Maricel Ca MD  Reason for evaluation:   1 Year Follow Up      Subjective:   History of present illness on initial date of evaluation:   Mariya Julio is a 54 y.o. patient who presented for hospital follow up and s/p [facility] 19. He presented to the hospital with complaints of chest pain. The pain was a dull pressure in chest, left side that did not radiate. Took 2 NTG and did not help but NTG patch in hosp helped. -N,V, had some jaw and lip numbness (just left). Whole event lasted few hours. Prior to that was active and running and no angina or CP. States same symptoms as back with last stent. At visit he reported feeling good. Patient denied chest pain, sob, palpitations, dizziness or syncope. He is taking his medications as prescribed. Last OV 20 had c/o of episode of left sided facial numbness. He stated he had left arm pain and numbness and his left hand went numb. He stated he has been under a lot of stress, especially with the kids being home. He stated the symptoms were similar to prior to his stents. He stated he has an occasional \"pinching\" in his chest.   He stated he took a SL nitro and helped some. He stated he also had a lot of gas. Today he reports feeling good. He denies chest pain. He states he will get a little SOB with hard exertion, but otherwise feeling ok. He is taking his medications as prescribed. Stress test 20 was negative. He states he has noticed that it takes longer for him to heal if he gets a cut or scrape.        Patient Active Problem List   Diagnosis    IgA nephropathy    Anxiety    HTN (hypertension)    Contusion of toe    Tobacco use disorder    Lumbar disc disease    Arthritis of shoulder region, degenerative    DDD (degenerative disc disease), lumbosacral DAILY 30 tablet 5    clopidogrel (PLAVIX) 75 MG tablet TAKE ONE TABLET BY MOUTH DAILY 30 tablet 1    metoprolol succinate (TOPROL XL) 25 MG extended release tablet TAKE ONE TABLET BY MOUTH DAILY 30 tablet 4    Ondansetron HCl (ZOFRAN PO) Take by mouth      ranolazine (RANEXA) 500 MG extended release tablet Take 1 tablet by mouth 2 times daily 60 tablet 5    pantoprazole (PROTONIX) 40 MG tablet Take 40 mg by mouth daily      oxyCODONE-acetaminophen (PERCOCET) 7.5-325 MG per tablet Take 1 tablet by mouth 2 times daily.  rosuvastatin (CRESTOR) 20 MG tablet Take 20 mg by mouth nightly      nitroGLYCERIN (NITROSTAT) 0.4 MG SL tablet up to max of 3 total doses. If no relief after 1 dose, call 911. 25 tablet 3    QUEtiapine (SEROQUEL) 25 MG tablet TK 1 T PO QPM  5    albuterol sulfate HFA (VENTOLIN HFA) 108 (90 Base) MCG/ACT inhaler Inhale 2 puffs into the lungs every 6 hours as needed for Wheezing (Patient not taking: Reported on 5/13/2021) 1 Inhaler 3    ALPRAZolam (XANAX) 0.5 MG tablet Take 0.5 mg by mouth nightly as needed for Sleep.  zolpidem (AMBIEN) 10 MG tablet Take 10 mg by mouth nightly as needed for Sleep. No current facility-administered medications for this visit. Allergies:  Penicillins     Review of Systems:   A 14 point review of symptoms completed. Pertinent positives identified in the HPI, all other review of symptoms negative as below.     Objective:   PHYSICAL EXAM:    Vitals:    05/13/21 1028   BP: 118/72   Pulse: 68   SpO2: 96%    Weight: 208 lb (94.3 kg)     Wt Readings from Last 3 Encounters:   05/13/21 208 lb (94.3 kg)   10/15/20 194 lb 4.8 oz (88.1 kg)   04/23/20 190 lb 12.8 oz (86.5 kg)         General Appearance:  Alert, cooperative, no distress, appears stated age   Head:  Normocephalic, atraumatic   Eyes:  PERRL, conjunctiva/corneas clear   Nose: Nares normal, no drainage or sinus tenderness   Throat: Lips, mucosa, and tongue normal   Neck: Supple, symmetrical, trachea midline, NL thyroid no carotid bruit or JVD   Lungs:   CTAB, respirations unlabored   Chest Wall:  No tenderness or deformity   Heart:  Regular rhythm and normal rate; S1, S2 are normal;   no murmur noted; no rub or gallop   Abdomen:   Soft, non-tender, +BS x 4, no masses, no organomegaly   Extremities: Extremities normal, atraumatic, no cyanosis or edema   Pulses: 2+ and symmetric   Skin: Skin color, texture, turgor normal, no rashes or lesions   Pysch: Normal mood and affect   Neurologic: Normal gross motor and sensory exam.         LABS   CBC:      Lab Results   Component Value Date    WBC 5.3 10/15/2020    RBC 4.53 10/15/2020    HGB 13.4 10/15/2020    HCT 38.4 10/15/2020    MCV 84.6 10/15/2020    RDW 13.1 10/15/2020     10/15/2020     CMP:  Lab Results   Component Value Date     10/15/2020    K 4.0 10/15/2020     10/15/2020    CO2 27 10/15/2020    BUN 12 10/15/2020    CREATININE 1.2 10/15/2020    GFRAA >60 10/15/2020    GFRAA >60 05/07/2013    AGRATIO 1.4 10/15/2020    LABGLOM >60 10/15/2020    GLUCOSE 96 10/15/2020    PROT 6.5 10/15/2020    PROT 7.7 05/10/2012    CALCIUM 8.8 10/15/2020    BILITOT 0.5 10/15/2020    ALKPHOS 73 10/15/2020    AST 18 10/15/2020    ALT 18 10/15/2020     PT/INR:   No results found for: PTINR  Liver:  No components found for: CHLPL  Lab Results   Component Value Date    ALT 18 10/15/2020    AST 18 10/15/2020    ALKPHOS 73 10/15/2020    BILITOT 0.5 10/15/2020     No results found for: LABA1C  Lipids:         Lab Results   Component Value Date    TRIG 90 08/22/2019    TRIG 94 05/14/2019    TRIG 155 (H) 10/14/2018            Lab Results   Component Value Date    HDL 44 06/04/2019    HDL 38 (L) 05/14/2019    HDL 34 (L) 10/14/2018            Lab Results   Component Value Date    LDLCALC 57 06/04/2019    LDLCALC 43 05/14/2019    LDLCALC 103 (H) 10/14/2018            Lab Results   Component Value Date    LABVLDL 14 06/04/2019    LABVLDL 19 05/14/2019 LABVLDL 31 10/14/2018         CARDIAC DATA   EK2019  Sinus bradycardiaOtherwise normal ECGWhen compared with ECG of 13-MAY-2019 13:44,QRS duration has decreasedConfirmed by Mariel Latham MD, Mariam Pulido ([id number]) on 2019 6:25:10 PM      ECHO/MUGA:    LV systolic function is normal with ejection fraction estimated at 55%. Mild hypokinesis of the distal apical-septal segment on certain views. Grade I diastolic dysfunction with normal left ventricular filling pressure. Mild pulmonic regurgitation     Stress Test 20  Stress Interpretation  Low normal LVEF 51%  Mild to moderately dilated LV, LVEDV 174cc and LVESV 85cc  Gennaro-Apical scar/hypokinesis, with minimal basal anterior wall  king-infarct ischemia  Diaphragmatic attenuation artifact       STRESS TEST: 2019       Low normal LV function with normal wall motion    Small to moderate area of inferior ischemia (likely related to noted disease    in obtuse marginal in recent cath)    Excellent functional capacity    Overall this is a low to intermediate risk study        CARDIAC CATH: 19     LEFT HEART CATH  LM: distal 15%  LAD: mild disease in prox and mid 30-40%, previous distal lesion not seen                Diag1- prox 60% (FFR+) mid stent patent with mild restenosis of old stent  LCX: luminals              OM1- previous lesion significant improved 20% (leter in case about 60% c/w vasospasm)                OM2- prox 40%  RCA: dominant,  luminals     LVEDP: 5  LVEF: 60%     FFR of OM2: 0.97-->0.85  FFR of Diag1:  0.89-->0.71  Assessment  1. Compared to last cath of 10/2018 several lesions different and improved in size suggestive vasospasm  2. Negative FFR of previous LCX lesion. + vasospasm  3. Abnormal FFR of Diag trested successfully for POBA and cutting balloons.                  - residual 10%                - if restenosis, will consider OCT, laser and KEITH PCI     Cath: 10/2018    PCI of Diag 1  STENT: Integritiy bare metal 2.25 x 18mm

## 2021-05-13 ENCOUNTER — OFFICE VISIT (OUTPATIENT)
Dept: CARDIOLOGY CLINIC | Age: 51
End: 2021-05-13
Payer: COMMERCIAL

## 2021-05-13 VITALS
DIASTOLIC BLOOD PRESSURE: 72 MMHG | WEIGHT: 208 LBS | BODY MASS INDEX: 31.52 KG/M2 | HEART RATE: 68 BPM | SYSTOLIC BLOOD PRESSURE: 118 MMHG | HEIGHT: 68 IN | OXYGEN SATURATION: 96 %

## 2021-05-13 DIAGNOSIS — I10 ESSENTIAL HYPERTENSION: ICD-10-CM

## 2021-05-13 DIAGNOSIS — I20.1 CORONARY ARTERY SPASM (HCC): ICD-10-CM

## 2021-05-13 DIAGNOSIS — E78.2 MIXED HYPERLIPIDEMIA: ICD-10-CM

## 2021-05-13 DIAGNOSIS — I25.110 CORONARY ARTERY DISEASE INVOLVING NATIVE CORONARY ARTERY OF NATIVE HEART WITH UNSTABLE ANGINA PECTORIS (HCC): Primary | ICD-10-CM

## 2021-05-13 PROCEDURE — 1036F TOBACCO NON-USER: CPT | Performed by: INTERNAL MEDICINE

## 2021-05-13 PROCEDURE — G8427 DOCREV CUR MEDS BY ELIG CLIN: HCPCS | Performed by: INTERNAL MEDICINE

## 2021-05-13 PROCEDURE — G8417 CALC BMI ABV UP PARAM F/U: HCPCS | Performed by: INTERNAL MEDICINE

## 2021-05-13 PROCEDURE — 99213 OFFICE O/P EST LOW 20 MIN: CPT | Performed by: INTERNAL MEDICINE

## 2021-05-13 NOTE — PATIENT INSTRUCTIONS
Patient Plan:  1. Labs - Lipids   ~we will call you with the results   2. You may ween off the Ranexa. Take 500 mg once daily for 1 week and then may stop taking it. If symptoms return, you may restart the medication. 3. Continue all other medications  4. No cardiac testing warrented  5.  Follow up in 1 year or sooner if needed

## 2021-09-10 RX ORDER — CLOPIDOGREL BISULFATE 75 MG/1
TABLET ORAL
Qty: 90 TABLET | Refills: 1 | Status: SHIPPED | OUTPATIENT
Start: 2021-09-10 | End: 2022-03-23 | Stop reason: SDUPTHER

## 2022-01-28 RX ORDER — METOPROLOL SUCCINATE 25 MG/1
TABLET, EXTENDED RELEASE ORAL
Qty: 90 TABLET | Refills: 1 | Status: SHIPPED | OUTPATIENT
Start: 2022-01-28 | End: 2022-08-22

## 2022-01-28 NOTE — TELEPHONE ENCOUNTER
Carmen No told patient they have tried several times to request refill on metoprolol. He is now out of medication.

## 2022-03-23 RX ORDER — CLOPIDOGREL BISULFATE 75 MG/1
TABLET ORAL
Qty: 90 TABLET | Refills: 1 | Status: SHIPPED | OUTPATIENT
Start: 2022-03-23

## 2022-03-29 ENCOUNTER — TELEPHONE (OUTPATIENT)
Dept: CARDIOLOGY CLINIC | Age: 52
End: 2022-03-29

## 2022-03-29 NOTE — TELEPHONE ENCOUNTER
Patient is doing extremely well. As long she is having no new symptoms of chest pain he is okay to proceed at low risk. I recommend continuing his aspirin and Toprol perioperatively.   Okay to come in for EKG prior to surgery

## 2022-03-29 NOTE — TELEPHONE ENCOUNTER
Received fax from The [address] [place] [address] Surgery requesting cardiac clearance for a Robot Assisted Left Inguinal Hernia Repair with Mesh on 4/18/2022. Please advise on cardiac risk and if the pt can hold Plavix 7 days prior to surgery.

## 2022-03-30 NOTE — TELEPHONE ENCOUNTER
Attempted to call pt to relay message regarding cardiac clearance and to schedule EKG lab appt before procedure on 4/18/2022. LVMOM for pt to return call. Need to ask pt if he is having any new symptoms.

## 2022-03-30 NOTE — TELEPHONE ENCOUNTER
Pt called back and is scheduled for ekg for tomorrow 3/31/22 at 4p. During call I didn't see the message about asking if pt has new symptoms. I left a message to call back to inform us if he is having any new symptoms.

## 2022-03-31 ENCOUNTER — NURSE ONLY (OUTPATIENT)
Dept: CARDIOLOGY CLINIC | Age: 52
End: 2022-03-31
Payer: COMMERCIAL

## 2022-03-31 ENCOUNTER — TELEPHONE (OUTPATIENT)
Dept: CARDIOLOGY CLINIC | Age: 52
End: 2022-03-31

## 2022-03-31 DIAGNOSIS — Z01.818 PRE-OP TESTING: Primary | ICD-10-CM

## 2022-03-31 PROCEDURE — 93000 ELECTROCARDIOGRAM COMPLETE: CPT | Performed by: INTERNAL MEDICINE

## 2022-03-31 NOTE — LETTER
Delora Heimlich, MD   [address] [place] [address] [address] [facility] [place] [address] [place] [address]  Phone: [phone]  Fax: [phone]    March 31, 2022    Kati Camacho 1970  [place]  [id number]    To whom it may concern,     Kati Camacho may proceed at low cardiac risk with the scheduled Robot Assisted Left Inguinal Hernia Repair With Mesh. Patient's EKG was reviewed and it is within normal limits. Patient can hold Plavix 7 days prior to procedure. I recommend that the patient continues taking Aspirin and Toprol. If you have any questions or concerns, please don't hesitate to call.     Sincerely,        Delora Heimlich, MD

## 2022-04-01 NOTE — TELEPHONE ENCOUNTER
Cardiac Clearance letter created, scanned into media, and faxed to The [address] [place] [address] Surgery.

## 2022-04-22 ENCOUNTER — TELEPHONE (OUTPATIENT)
Dept: CARDIOLOGY CLINIC | Age: 52
End: 2022-04-22

## 2022-04-22 NOTE — TELEPHONE ENCOUNTER
Lungs patient is having no new symptoms chest pain or angina he is okay to proceed at low risk.   Okay to hold Plavix 1 week prior to procedure if needed

## 2022-04-22 NOTE — TELEPHONE ENCOUNTER
Received fax from [address] requesting cardiac clearance for upcoming EGD. Please advise cardiac risk and if the pt can hold Plavix 5 days prior.

## 2022-04-22 NOTE — LETTER
[address] Cardiology - [address]  Phone: [phone]  Fax: [phone]    Samia Baires MD        April 25, 2022    [facility] [address] [place] [id number]      To whom it may concern,     Parveen Beck is ok to proceed at a low cardiac risk with the scheduled EGD. It is ok for the patient to hold Plavix one week prior to this procedure. Please have the patient continue this medication as soon as it is safe to do so. If you have any questions or concerns, please don't hesitate to call.     Sincerely,        Samia Baires MD

## 2022-04-25 NOTE — TELEPHONE ENCOUNTER
Spoke to pt to relay message. Pt V/U. Pt denied symptoms. Cardiac clearance letter created, scanned into media, and faxed.

## 2022-08-22 ENCOUNTER — TELEPHONE (OUTPATIENT)
Dept: CARDIOLOGY CLINIC | Age: 52
End: 2022-08-22

## 2022-08-22 RX ORDER — METOPROLOL SUCCINATE 25 MG/1
TABLET, EXTENDED RELEASE ORAL
Qty: 90 TABLET | Refills: 0 | Status: SHIPPED | OUTPATIENT
Start: 2022-08-22

## 2022-08-22 NOTE — TELEPHONE ENCOUNTER
Please contact pt for yearly appt/med refills appt JJP. If the office can not get a hold of the patient for an appt please mail a letter to have them call and schedule. Thanks.

## 2022-08-22 NOTE — TELEPHONE ENCOUNTER
08/22 called pt @ [phone] and m for pt to call [facility] and schedule annual appt w/ jjp. Will send letter after 2nd attempt.

## 2022-08-24 NOTE — TELEPHONE ENCOUNTER
08/24-Called ([phone]) sounded like someone had answered but no one was on the other side of the line, unable to LM. Called ([phone]) unable to make contact, unable to LM. Letter has been sent to listed address on file.

## 2022-11-07 RX ORDER — CLOPIDOGREL BISULFATE 75 MG/1
TABLET ORAL
Qty: 30 TABLET | Refills: 0 | Status: SHIPPED | OUTPATIENT
Start: 2022-11-07

## 2022-11-22 ENCOUNTER — OFFICE VISIT (OUTPATIENT)
Dept: CARDIOLOGY CLINIC | Age: 52
End: 2022-11-22
Payer: COMMERCIAL

## 2022-11-22 VITALS
BODY MASS INDEX: 28.04 KG/M2 | HEIGHT: 68 IN | WEIGHT: 185 LBS | DIASTOLIC BLOOD PRESSURE: 84 MMHG | HEART RATE: 97 BPM | SYSTOLIC BLOOD PRESSURE: 120 MMHG | OXYGEN SATURATION: 93 %

## 2022-11-22 DIAGNOSIS — I25.10 CORONARY ARTERY DISEASE INVOLVING NATIVE CORONARY ARTERY OF NATIVE HEART WITHOUT ANGINA PECTORIS: ICD-10-CM

## 2022-11-22 DIAGNOSIS — I10 PRIMARY HYPERTENSION: ICD-10-CM

## 2022-11-22 DIAGNOSIS — E78.00 PURE HYPERCHOLESTEROLEMIA: Primary | ICD-10-CM

## 2022-11-22 PROCEDURE — 3017F COLORECTAL CA SCREEN DOC REV: CPT | Performed by: INTERNAL MEDICINE

## 2022-11-22 PROCEDURE — G8417 CALC BMI ABV UP PARAM F/U: HCPCS | Performed by: INTERNAL MEDICINE

## 2022-11-22 PROCEDURE — 1036F TOBACCO NON-USER: CPT | Performed by: INTERNAL MEDICINE

## 2022-11-22 PROCEDURE — 3074F SYST BP LT 130 MM HG: CPT | Performed by: INTERNAL MEDICINE

## 2022-11-22 PROCEDURE — G8484 FLU IMMUNIZE NO ADMIN: HCPCS | Performed by: INTERNAL MEDICINE

## 2022-11-22 PROCEDURE — 99214 OFFICE O/P EST MOD 30 MIN: CPT | Performed by: INTERNAL MEDICINE

## 2022-11-22 PROCEDURE — G8427 DOCREV CUR MEDS BY ELIG CLIN: HCPCS | Performed by: INTERNAL MEDICINE

## 2022-11-22 PROCEDURE — 3078F DIAST BP <80 MM HG: CPT | Performed by: INTERNAL MEDICINE

## 2022-11-22 RX ORDER — NORTRIPTYLINE HYDROCHLORIDE 10 MG/1
10 CAPSULE ORAL NIGHTLY
COMMUNITY
Start: 2022-10-19

## 2022-11-22 RX ORDER — TADALAFIL 10 MG/1
10 TABLET ORAL AS NEEDED
COMMUNITY
Start: 2022-08-22

## 2022-11-22 NOTE — PATIENT INSTRUCTIONS
Patient Plan:  1. Repeat Lipid panel (fasting 8 hours)   2. Continue current cardiac medications as prescribed   3.  Follow up in 1 year

## 2022-11-22 NOTE — PROGRESS NOTES
[address] [facility]   Cardiovascular Evaluation    PATIENT: Kasey Duckworth  DATE: 2022  MRN: [id number]  CSN: [id number]  : 1970      Primary Care Doctor: Chely Cevallos MD  Reason for evaluation:   No chief complaint on file. Subjective:   History of present illness on initial date of evaluation:   Kasey Duckworth is a 50 y.o. patient who presented for hospital follow up and s/p [address] [facility] 19. He presented to the hospital with complaints of chest pain. The pain was a dull pressure in chest, left side that did not radiate. Took 2 NTG and did not help but NTG patch in hosp helped. -N,V, had some jaw and lip numbness (just left). Whole event lasted few hours. Prior to that was active and running and no angina or CP. States same symptoms as back with last stent. At visit he reported feeling good. Patient denied chest pain, sob, palpitations, dizziness or syncope. He is taking his medications as prescribed. Last OV 20 had c/o of episode of left sided facial numbness. He stated he had left arm pain and numbness and his left hand went numb. He stated he has been under a lot of stress, especially with the kids being home. He stated the symptoms were similar to prior to his stents. He stated he has an occasional \"pinching\" in his chest.   He stated he took a SL nitro and helped some. He stated he also had a lot of gas. At his office visit on 2021 he reported he was feeling good. He stated he will get a little SOB with hard exertion, but otherwise feeling ok. Stress test 20 was negative. Today, 2022, he states that he feels well. He states that his left pinky always stays numb for the last 6 months. He reports he hs been off ranexa and doing well. He does get occasional chest pain with excitement and exertion. Patient denies current edema, sob, palpitations, dizziness or syncope.  Patient is taking all cardiac medications as prescribed and tolerates them well. Patient Active Problem List   Diagnosis    IgA nephropathy    Anxiety    HTN (hypertension)    Contusion of toe    Tobacco use disorder    Lumbar disc disease    Arthritis of shoulder region, degenerative    DDD (degenerative disc disease), lumbosacral    Chronic pain syndrome    DDD (degenerative disc disease), lumbar    Facet joint syndrome    Neuropathic pain    Insomnia    Plantar fasciitis    NSTEMI (non-ST elevated myocardial infarction) ([address]    Chest pain    Coronary artery disease involving native coronary artery of native heart with unstable angina pectoris ([address]    Pure hypercholesterolemia    Unstable angina (HCC)    Combined hyperlipidemia    S/P PTCA (percutaneous transluminal coronary angioplasty)    Pneumonia due to organism    Hypoxia    Nausea vomiting and diarrhea    Acute respiratory failure with hypoxia and hypercapnia (HCC)    Septic shock ([address]    Dysphagia    Former smoker    Terminal ileitis of small intestine (HCC)    Terminal ileitis of small intestine, other complication ([address]         Past Medical History:   has a past medical history of CAD (coronary artery disease), Chest pain, Chronic pain syndrome, Current smoker, DDD (degenerative disc disease), lumbar, Facet joint syndrome, High cholesterol, Hypertension, IgA nephropathy, Insomnia, Neuropathic pain, NSTEMI (non-ST elevated myocardial infarction) ([address] and Pain management contract agreement. Surgical History:   has a past surgical history that includes Percutaneous Transluminal Coronary Angio (10/15/2018); Dental surgery; Coronary angioplasty (06/04/2019); bronchoscopy (N/A, 8/21/2019); and bronchoscopy (8/21/2019). Social History:   reports that he quit smoking about 4 years ago. His smoking use included cigarettes. He has a 10.00 pack-year smoking history. He has never used smokeless tobacco. He reports that he does not drink alcohol and does not use drugs.      Family History:  No evidence for sudden cardiac death or premature CAD    Home Medications:  Reviewed and are listed in nursing record. and/or listed below  Current Outpatient Medications   Medication Sig Dispense Refill    clopidogrel (PLAVIX) 75 MG tablet TAKE ONE TABLET BY MOUTH DAILY 30 tablet 0    metoprolol succinate (TOPROL XL) 25 MG extended release tablet TAKE ONE TABLET BY MOUTH DAILY 90 tablet 0    ASPIRIN LOW DOSE 81 MG chewable tablet CHEW AND SWALLOW ONE TABLET BY MOUTH DAILY 30 tablet 5    Ondansetron HCl (ZOFRAN PO) Take by mouth      ranolazine (RANEXA) 500 MG extended release tablet Take 1 tablet by mouth 2 times daily 60 tablet 5    pantoprazole (PROTONIX) 40 MG tablet Take 40 mg by mouth daily      QUEtiapine (SEROQUEL) 25 MG tablet TK 1 T PO QPM  5    albuterol sulfate HFA (VENTOLIN HFA) 108 (90 Base) MCG/ACT inhaler Inhale 2 puffs into the lungs every 6 hours as needed for Wheezing (Patient not taking: Reported on 5/13/2021) 1 Inhaler 3    ALPRAZolam (XANAX) 0.5 MG tablet Take 0.5 mg by mouth nightly as needed for Sleep. oxyCODONE-acetaminophen (PERCOCET) 7.5-325 MG per tablet Take 1 tablet by mouth 2 times daily. rosuvastatin (CRESTOR) 20 MG tablet Take 20 mg by mouth nightly      zolpidem (AMBIEN) 10 MG tablet Take 10 mg by mouth nightly as needed for Sleep.      nitroGLYCERIN (NITROSTAT) 0.4 MG SL tablet up to max of 3 total doses. If no relief after 1 dose, call 911. 25 tablet 3     No current facility-administered medications for this visit. Allergies:  Penicillins     Review of Systems:   A 14 point review of symptoms completed. Pertinent positives identified in the HPI, all other review of symptoms negative as below. Objective:   PHYSICAL EXAM:    There were no vitals filed for this visit.          Wt Readings from Last 3 Encounters:   05/13/21 208 lb (94.3 kg)   10/15/20 194 lb 4.8 oz (88.1 kg)   04/23/20 190 lb 12.8 oz (86.5 kg)         General Appearance:  Alert, cooperative, no distress, appears stated age   Head:  Normocephalic, atraumatic   Eyes:  PERRL, conjunctiva/corneas clear   Nose: Nares normal, no drainage or sinus tenderness   Throat: Lips, mucosa, and tongue normal   Neck: Supple, symmetrical, trachea midline, NL thyroid no carotid bruit or JVD   Lungs:   CTAB, respirations unlabored   Chest Wall:  No tenderness or deformity   Heart:  Regular rhythm and normal rate; S1, S2 are normal;   no murmur noted; no rub or gallop   Abdomen:   Soft, non-tender, +BS x 4, no masses, no organomegaly   Extremities: Extremities normal, atraumatic, no cyanosis or edema   Pulses: 2+ and symmetric   Skin: Skin color, texture, turgor normal, no rashes or lesions   Pysch: Normal mood and affect   Neurologic: Normal gross motor and sensory exam.         LABS   CBC:      Lab Results   Component Value Date/Time    WBC 5.3 10/15/2020 07:21 AM    RBC 4.53 10/15/2020 07:21 AM    HGB 13.4 10/15/2020 07:21 AM    HCT 38.4 10/15/2020 07:21 AM    MCV 84.6 10/15/2020 07:21 AM    RDW 13.1 10/15/2020 07:21 AM     10/15/2020 07:21 AM     CMP:  Lab Results   Component Value Date/Time     10/15/2020 07:21 AM    K 4.0 10/15/2020 07:21 AM     10/15/2020 07:21 AM    CO2 27 10/15/2020 07:21 AM    BUN 12 10/15/2020 07:21 AM    CREATININE 1.2 10/15/2020 07:21 AM    GFRAA >60 10/15/2020 07:21 AM    GFRAA >60 05/07/2013 02:55 PM    AGRATIO 1.4 10/15/2020 07:21 AM    LABGLOM >60 10/15/2020 07:21 AM    GLUCOSE 96 10/15/2020 07:21 AM    PROT 6.5 10/15/2020 07:21 AM    PROT 7.7 05/10/2012 01:07 PM    CALCIUM 8.8 10/15/2020 07:21 AM    BILITOT 0.5 10/15/2020 07:21 AM    ALKPHOS 73 10/15/2020 07:21 AM    AST 18 10/15/2020 07:21 AM    ALT 18 10/15/2020 07:21 AM     PT/INR:   No results found for: PTINR  Liver:  No components found for: CHLPL  Lab Results   Component Value Date    ALT 18 10/15/2020    AST 18 10/15/2020    ALKPHOS 73 10/15/2020    BILITOT 0.5 10/15/2020     No results found for: LABA1C  Lipids:         Lab Results Component Value Date    TRIG 90 2019    TRIG 94 2019    TRIG 155 (H) 10/14/2018            Lab Results   Component Value Date    HDL 44 2019    HDL 38 (L) 2019    HDL 34 (L) 10/14/2018            Lab Results   Component Value Date    LDLCALC 57 2019    LDLCALC 43 2019    LDLCALC 103 (H) 10/14/2018            Lab Results   Component Value Date    LABVLDL 14 2019    LABVLDL 19 2019    LABVLDL 31 10/14/2018         CARDIAC DATA   EK2019  Sinus bradycardiaOtherwise normal ECGWhen compared with ECG of 13-MAY-2019 13:44,QRS duration has decreasedConfirmed by Corey Moss MD, Don Hightower ([id number]) on 2019 6:25:10 PM      ECHO/MUGA:    LV systolic function is normal with ejection fraction estimated at 55%. Mild hypokinesis of the distal apical-septal segment on certain views. Grade I diastolic dysfunction with normal left ventricular filling pressure.    Mild pulmonic regurgitation     Stress Test 20  Stress Interpretation  Low normal LVEF 51%  Mild to moderately dilated LV, LVEDV 174cc and LVESV 85cc  Gennaro-Apical scar/hypokinesis, with minimal basal anterior wall  king-infarct ischemia  Diaphragmatic attenuation artifact       STRESS TEST: 2019       Low normal LV function with normal wall motion    Small to moderate area of inferior ischemia (likely related to noted disease    in obtuse marginal in recent cath)    Excellent functional capacity    Overall this is a low to intermediate risk study        CARDIAC CATH: 19     LEFT HEART CATH  LM: distal 15%  LAD: mild disease in prox and mid 30-40%, previous distal lesion not seen                Diag1- prox 60% (FFR+) mid stent patent with mild restenosis of old stent  LCX: luminals              OM1- previous lesion significant improved 20% (leter in case about 60% c/w vasospasm)                OM2- prox 40%  RCA: dominant,  luminals     LVEDP: 5  LVEF: 60%     FFR of OM2: 0.97-->0.85  FFR of Diag1: 0.89-->0.71  Assessment  1. Compared to last cath of 10/2018 several lesions different and improved in size suggestive vasospasm  2. Negative FFR of previous LCX lesion. + vasospasm  3. Abnormal FFR of Diag trested successfully for POBA and cutting balloons. - residual 10%                - if restenosis, will consider OCT, laser and KEITH PCI     Cath: 10/2018    PCI of Diag 1  STENT: Integritiy bare metal 2.25 x 18mm   post dilated with 2.25mm     VASCULAR/OTHER IMAGING:      Assessment and Plan   Le Zaldivar is a 50 y.o. male who presents today for the following problems:       1. CAD              - 10/2018 s/p PCI to Diag   - -6/2019 PCI to Diag 1 for restenosis  2. HTN: well controlled  3. HLD: well controlled but out of date  4. Coronary artery spasm: seen last cath  5. Tobacco abuse: quit smoking ! PLAN  1. Patient is doing well. No angina with greater than 10 METS of activity  2. Successfully weaned off of Ranexa. 3.  Continue aspirin. Toprol.   Crestor.   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         Patient Active Problem List   Diagnosis    IgA nephropathy    Anxiety    HTN (hypertension)    Contusion of toe    Tobacco use disorder    Lumbar disc disease    Arthritis of shoulder region, degenerative    DDD (degenerative disc disease), lumbosacral    Chronic pain syndrome    DDD (degenerative disc disease), lumbar    Facet joint syndrome    Neuropathic pain    Insomnia    Plantar fasciitis    NSTEMI (non-ST elevated myocardial infarction) ([address]    Chest pain    Coronary artery disease involving native coronary artery of native heart with unstable angina pectoris ([address]    Pure hypercholesterolemia    Unstable angina (HCC)    Combined hyperlipidemia    S/P PTCA (percutaneous transluminal coronary angioplasty)    Pneumonia due to organism    Hypoxia    Nausea vomiting and diarrhea    Acute respiratory failure with hypoxia and hypercapnia ([address]    Septic shock ([address]    Dysphagia    Former smoker    Terminal ileitis of small intestine ([address]    Terminal ileitis of small intestine, other complication ([address]       Patient Plan:  1. Repeat Lipid panel (fasting 8 hours)   2. Continue current cardiac medications as prescribed   3. Follow up in 1 year        It is a pleasure to assist in the care of Jennifer Parra. Please call with any questions. This note was scribed in the presence of Breanna Lemus MD by Neil Ibanez RN.      Maryellen Oswald MD, personally performed the services described in this documentation as scribed by the above signed scribe in my presence, and it is both accurate and complete to the best of our ability and knowledge. I agree with the details independently gathered by my clinical support staff, while the remaining scribed note accurately describes my personal service to the patient. The above RN is working as a scribe for and in the presence of myself . Working as a scribe, the RN may have prepopulated components of this note with my historical intellectual property under my direct supervision. Any additions to this intellectual property were performed at my direction. Furthermore, the content and accuracy of this note have been reviewed by me to the best of my ability. This chart may be generated in part by using Dragon Dictation system and may contain errors related to that system including errors in grammar, punctuation, and spelling, as well as words and phrases that may be inappropriate. If there are any questions or concerns please feel free to contact the dictating provider for clarification.            Breanna Lemus MD, [address] [facility] Cardiologist  [address]  [phone] [facility]  [phone] [address] [place] [address]

## 2022-12-12 RX ORDER — METOPROLOL SUCCINATE 25 MG/1
TABLET, EXTENDED RELEASE ORAL
Qty: 90 TABLET | Refills: 3 | Status: SHIPPED | OUTPATIENT
Start: 2022-12-12

## 2023-01-03 ENCOUNTER — TELEPHONE (OUTPATIENT)
Dept: CARDIOLOGY CLINIC | Age: 53
End: 2023-01-03

## 2023-01-03 RX ORDER — CLOPIDOGREL BISULFATE 75 MG/1
75 TABLET ORAL DAILY
Qty: 30 TABLET | Refills: 5 | Status: SHIPPED | OUTPATIENT
Start: 2023-01-03

## 2023-01-03 NOTE — TELEPHONE ENCOUNTER
Medication Refill    Medication needing refilled:clopidogrel (PLAVIX) 75 MG tablet       Dosage of the medication: 75 mg     How are you taking this medication (QD, BID, TID, QID, PRN):        Sig: TAKE ONE TABLET BY MOUTH DAILY             30 or 90 day supply called in: 30    When will you run out of your medication: A MONTH AGO      Which Pharmacy are we sending the medication to?:      Brian Ochoa [id number] [facility] [address] [facility] [phone] - F [phone]   [address] [place] [address] [address] [address]   Phone:  [phone]  Fax:  [phone]

## 2023-07-12 ENCOUNTER — TELEPHONE (OUTPATIENT)
Dept: CARDIOLOGY CLINIC | Age: 53
End: 2023-07-12

## 2023-07-12 NOTE — TELEPHONE ENCOUNTER
Spoke to pt. He started having CP after a bee sting. Recurred today after a bee sting. Advised to go to the ER if CP recurs under any condition. Patient schedule with Katie Alvarenga on 07/17/23.   LYNETTE

## 2023-07-12 NOTE — TELEPHONE ENCOUNTER
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 He went home took nitro and this resolved. Today pt got stung by hornets and he had another episode. No numbness in arm but his fingers lock up. Was Dr Lucho Pastrana pt, and he said he was never notified to choose another cardiologist. 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 Please advise.

## 2023-08-15 NOTE — TELEPHONE ENCOUNTER
08/15 called pt @ [phone] and lvm to return call and schedule annual w/ fxw in November.  Former derekp pt

## 2023-08-18 RX ORDER — CLOPIDOGREL BISULFATE 75 MG/1
TABLET ORAL
Qty: 30 TABLET | Refills: 5 | Status: SHIPPED | OUTPATIENT
Start: 2023-08-18

## 2023-09-28 NOTE — PROGRESS NOTES
[address] [facility] [address] - Progress/Follow-up Note        REASON FOR FOLLOW UP  Stable CAD      INTERVAL HISTORY  Mr. Geraldine Brush is a 48 y.o. male presenting for follow up. He has a medical history of hypertension, hyperlipidemia, CAD s/p LHC and PCI in 2018 and 2019. In 2018, he underwent PCI to diagonal with bare-metal stent. In 2019, he underwent repeat left heart cath and was found to have FFR positive proximal diagonal 1 and that underwent POBA. FFR of 0.2 was negative. At his office visit on 05/13/2021 he stated he will get a little SOB with hard exertion, but otherwise feeling ok. Recent stress test 7/17/20 was negative. LOV with Dr. Sneed Silence 11/22/2022, he reported he has been off ranexa and doing well. He does get occasional chest pain with excitement and exertion but it is not consistent and not activity limiting. Today, 10/2/2023, he reports he is doing well. He repots he stays busy working in [employer] and tolerates heavy exertion without difficulty. He reports he has plans for hernia repair surgery in a few months. He reports his wife is an RN and she checks his blood pressure frequently and it runs mostly normal at home. Patient is taking all cardiac medications as prescribed and tolerates them well. He reports mildly excessive bleeding when he cuts himself due to being on Plavix. Patient denies chest pain/heaviness/pressure, palpitations, dyspnea, orthopnea, edema, lightheadedness, syncope. REVIEW OF SYSTEMS  10 point ROS done and negative other than HPI    Details of patient's medical, family and social history reviewed and updated as necessary.       CURRENT CARDIAC MEDICATIONS  Aspirin 81 - not taking  Plavix 75  Toprol xl 25  Nitro as needed  Crestor 20      VITALS  BP (!) 148/80   Pulse 87   SpO2 98%   No intake or output data in the 24 hours ending 10/02/23 0854    PHYSICAL EXAM  General appearance - alert, cooperative, no distress, appears stated age  Neck - Supple,

## 2023-10-02 ENCOUNTER — OFFICE VISIT (OUTPATIENT)
Dept: CARDIOLOGY CLINIC | Age: 53
End: 2023-10-02

## 2023-10-02 VITALS
SYSTOLIC BLOOD PRESSURE: 148 MMHG | OXYGEN SATURATION: 98 % | DIASTOLIC BLOOD PRESSURE: 80 MMHG | BODY MASS INDEX: 31.07 KG/M2 | HEIGHT: 68 IN | WEIGHT: 205 LBS | HEART RATE: 87 BPM

## 2023-10-02 DIAGNOSIS — I25.118 ATHEROSCLEROSIS OF NATIVE CORONARY ARTERY OF NATIVE HEART WITH STABLE ANGINA PECTORIS (HCC): ICD-10-CM

## 2023-10-02 DIAGNOSIS — I10 PRIMARY HYPERTENSION: ICD-10-CM

## 2023-10-02 DIAGNOSIS — I21.4 NSTEMI (NON-ST ELEVATED MYOCARDIAL INFARCTION) (HCC): ICD-10-CM

## 2023-10-02 DIAGNOSIS — I25.110 CORONARY ARTERY DISEASE INVOLVING NATIVE CORONARY ARTERY OF NATIVE HEART WITH UNSTABLE ANGINA PECTORIS (HCC): Primary | ICD-10-CM

## 2023-10-02 DIAGNOSIS — Z98.61 S/P PTCA (PERCUTANEOUS TRANSLUMINAL CORONARY ANGIOPLASTY): ICD-10-CM

## 2023-10-02 RX ORDER — METOPROLOL SUCCINATE 25 MG/1
25 TABLET, EXTENDED RELEASE ORAL DAILY
Qty: 90 TABLET | Refills: 3 | Status: SHIPPED | OUTPATIENT
Start: 2023-10-02

## 2023-10-02 RX ORDER — ASPIRIN 81 MG/1
TABLET, CHEWABLE ORAL
Qty: 90 TABLET | Refills: 3 | Status: SHIPPED | OUTPATIENT
Start: 2023-10-02

## 2023-10-02 NOTE — PATIENT INSTRUCTIONS
PLAN:  Stop plavix  Start aspirin 81 mg daily   Labs now fasting lipids  No further cardiac testing  Discussed monitoring your blood pressure at home.  Goal BP less than 140/80  Follow up with me in 1 year

## 2024-05-15 ENCOUNTER — TELEPHONE (OUTPATIENT)
Dept: CARDIOLOGY CLINIC | Age: 54
End: 2024-05-15

## 2024-05-15 NOTE — TELEPHONE ENCOUNTER
Dr. Dino Sharpe called requesting to speak to FXW regarding pt having unstable angina.  Sent page thru bat phone to FXW

## 2024-05-15 NOTE — TELEPHONE ENCOUNTER
Spoke to Dr. Sharpe.  He informs me that patient is having unstable angina.  The plan is to get him in to see somebody in cardiology clinic as soon as possible preferably tomorrow or Friday as he likely needs urgent coronary angiogram and cardiac cath.

## 2024-05-16 ENCOUNTER — TELEPHONE (OUTPATIENT)
Dept: CARDIOLOGY CLINIC | Age: 54
End: 2024-05-16

## 2024-05-16 NOTE — TELEPHONE ENCOUNTER
5/16- called pt @[phone]. Pt is now scheduled 5/17/2024 Efrain pack with FXW. Time and date per PMKW.    Please, ok double book me on Friday or Saturday this week

## 2024-05-16 NOTE — TELEPHONE ENCOUNTER
5/16- pt is established with FXW and has clinic tmr 5/17. per RNKC pt can be added to 5/17 1130am. Attempted to call pt @[phone]. m informing pt to call back to schedule appt tmr with FXW. I also attempted to call pt @[phone]. I spoke with pt's wife, Sebastian. Sebastian stated to call back at his number after 3:00pm.

## 2024-05-16 NOTE — TELEPHONE ENCOUNTER
----- Message from Gary Meyer MD sent at 5/15/2024  5:07 PM EDT -----  PCP from [facility] called about this patient and he is having unstable angina.  Can we please get him to see someone in our cardiology clinic either tomorrow or Friday somehow and then admit him for cardiac cath urgently.  Thank you

## 2024-05-16 NOTE — TELEPHONE ENCOUNTER
5/16- I reviewed FXW schedule and he does not have availability until 6/21 and both NP's do not have availability until the end of the month. Is an overbook able to be provided?

## 2024-05-16 NOTE — TELEPHONE ENCOUNTER
5/16- pt returned call. Stated 1130 would not work for him but he could come in at 3pm tmr 5/17. Is this okay to add on?    FYI- pt also gave a number that would work better for him. [phone]. Number is now added to pt chart.

## 2024-05-17 ENCOUNTER — OFFICE VISIT (OUTPATIENT)
Dept: CARDIOLOGY CLINIC | Age: 54
End: 2024-05-17

## 2024-05-17 VITALS
HEIGHT: 68 IN | WEIGHT: 192.5 LBS | BODY MASS INDEX: 29.18 KG/M2 | OXYGEN SATURATION: 98 % | HEART RATE: 86 BPM | SYSTOLIC BLOOD PRESSURE: 126 MMHG | DIASTOLIC BLOOD PRESSURE: 78 MMHG

## 2024-05-17 DIAGNOSIS — Z87.891 FORMER SMOKER: ICD-10-CM

## 2024-05-17 DIAGNOSIS — I20.9 NEW-ONSET ANGINA (HCC): ICD-10-CM

## 2024-05-17 DIAGNOSIS — I20.0 UNSTABLE ANGINA PECTORIS (HCC): ICD-10-CM

## 2024-05-17 DIAGNOSIS — I20.0 UNSTABLE ANGINA (HCC): Primary | ICD-10-CM

## 2024-05-17 DIAGNOSIS — E78.00 PURE HYPERCHOLESTEROLEMIA: ICD-10-CM

## 2024-05-17 DIAGNOSIS — I10 PRIMARY HYPERTENSION: ICD-10-CM

## 2024-05-17 DIAGNOSIS — I25.110 CORONARY ARTERY DISEASE INVOLVING NATIVE CORONARY ARTERY OF NATIVE HEART WITH UNSTABLE ANGINA PECTORIS (HCC): ICD-10-CM

## 2024-05-17 DIAGNOSIS — R07.9 CHEST PAIN, UNSPECIFIED TYPE: ICD-10-CM

## 2024-05-17 RX ORDER — NITROGLYCERIN 0.4 MG/1
TABLET SUBLINGUAL
Qty: 25 TABLET | Refills: 3 | Status: CANCELLED | OUTPATIENT
Start: 2024-05-17

## 2024-05-17 NOTE — PROGRESS NOTES
[facility] - Progress/Follow-up Note        REASON FOR FOLLOW UP  CAD, chest discomfort      INTERVAL HISTORY  Mr. David is a 54 y.o. male presenting for follow up. He has a medical history of hypertension, hyperlipidemia, CAD s/p LHC and PCI in 2018 and 2019.     In 2018, he underwent PCI to diagonal with bare-metal stent.    In 2019, he underwent repeat left heart cath and was found to have FFR positive proximal diagonal 1 and that underwent POBA.    At his office visit on 05/13/2021 he stated he gets a little SOB with hard exertion, but otherwise feeling ok.  Recent stress test 7/17/20 was negative.     At his office visit with Dr. Burleson 11/22/2022, he reported he has been off ranexa and doing well. He does get occasional chest pain with excitement and exertion but it is not consistent and not activity limiting.    Last visit with Dr. Meyer, 10/2/2023, he reports he is doing well. He repots he stays busy working in [employer] and tolerates heavy exertion without difficulty. He reports he has plans for hernia repair surgery in a few months. He reports his wife is an RN and she checks his blood pressure frequently and it runs mostly normal at home. Patient is taking all cardiac medications as prescribed and tolerates them well.  He reports mildly excessive bleeding when he cuts himself due to being on Plavix.  We stopped his Plavix and started him on aspirin 81.  Lipid panel was ordered.    Today, 5/17/24, he reports he has noticed worsening pressure-like chest discomfort. He says he has been working harder and exerting himself more at work. He noticed an episode of numbness in his face about 3 weeks ago. He notices the numbness more with activity. He says this is not a similar symptoms that he had with his prior cath and PCI's. Patient is taking all cardiac medications as prescribed and tolerates them well.        REVIEW OF SYSTEMS  10 point ROS done and negative other than HPI    Details of

## 2024-05-17 NOTE — PATIENT INSTRUCTIONS
Your provider has ordered testing for further evaluation.  An order/prescription has been included in your paper work.   To schedule outpatient testing, contact Central Scheduling by calling [employer] ([phone]).      PLAN:  Stress test myoview to assess unstable angina  Continue current cardiac medications  Follow up in 6 months

## 2024-05-28 RX ORDER — METOPROLOL SUCCINATE 25 MG/1
25 TABLET, EXTENDED RELEASE ORAL DAILY
Qty: 90 TABLET | Refills: 3 | Status: SHIPPED | OUTPATIENT
Start: 2024-05-28

## 2024-06-11 ENCOUNTER — HOSPITAL ENCOUNTER (OUTPATIENT)
Dept: CARDIOLOGY | Age: 54
Discharge: HOME | End: 2024-06-13
Attending: INTERNAL MEDICINE
Payer: COMMERCIAL

## 2024-06-11 DIAGNOSIS — R07.9 CHEST PAIN, UNSPECIFIED TYPE: ICD-10-CM

## 2024-06-11 DIAGNOSIS — I20.0 UNSTABLE ANGINA PECTORIS (HCC): ICD-10-CM

## 2024-06-11 LAB
NUC STRESS EJECTION FRACTION: 56 %
NUC STRESS LV EDV: 126 ML (ref 67–155)
NUC STRESS LV ESV: 56 ML (ref 22–58)
NUC STRESS LV MASS: 157 G
STRESS BASELINE DIAS BP: 102 MMHG
STRESS BASELINE HR: 63 BPM
STRESS BASELINE SYS BP: 161 MMHG
STRESS ESTIMATED WORKLOAD: 4.7 METS
STRESS EXERCISE DUR MIN: 1100 MIN
STRESS EXERCISE DUR SEC: 42 SEC
STRESS PEAK DIAS BP: 114 MMHG
STRESS PEAK SYS BP: 199 MMHG
STRESS PERCENT HR ACHIEVED: 87 %
STRESS POST PEAK HR: 146 BPM
STRESS RATE PRESSURE PRODUCT: NORMAL BPM*MMHG
STRESS ST DEPRESSION: 1 MM
STRESS TARGET HR: 168 BPM
TID: 0.88

## 2024-06-11 PROCEDURE — 93017 CV STRESS TEST TRACING ONLY: CPT

## 2024-06-11 PROCEDURE — 3430000000 HC RX DIAGNOSTIC RADIOPHARMACEUTICAL: Performed by: INTERNAL MEDICINE

## 2024-06-11 PROCEDURE — 78452 HT MUSCLE IMAGE SPECT MULT: CPT | Performed by: INTERNAL MEDICINE

## 2024-06-11 PROCEDURE — A9502 TC99M TETROFOSMIN: HCPCS | Performed by: INTERNAL MEDICINE

## 2024-06-11 PROCEDURE — 93016 CV STRESS TEST SUPVJ ONLY: CPT | Performed by: INTERNAL MEDICINE

## 2024-06-11 PROCEDURE — 93018 CV STRESS TEST I&R ONLY: CPT | Performed by: INTERNAL MEDICINE

## 2024-06-11 RX ADMIN — TETROFOSMIN 31.6 MILLICURIE: 1.38 INJECTION, POWDER, LYOPHILIZED, FOR SOLUTION INTRAVENOUS at 10:26

## 2024-06-11 RX ADMIN — TETROFOSMIN 11.5 MILLICURIE: 1.38 INJECTION, POWDER, LYOPHILIZED, FOR SOLUTION INTRAVENOUS at 08:29

## 2024-06-19 ENCOUNTER — TELEPHONE (OUTPATIENT)
Dept: CARDIOLOGY CLINIC | Age: 54
End: 2024-06-19

## 2024-06-19 NOTE — TELEPHONE ENCOUNTER
----- Message from Gary Meyer MD sent at 6/19/2024 11:19 AM EDT -----  Please let patient know that his stress test is unremarkable.  Given these findings, I do not recommend a coronary angiogram at this point but if his symptoms continue despite adjustment in medical therapy and despite using nitroglycerin, we can discuss proceeding with coronary angiogram for definitive treatment and workup of etiology of his symptoms

## 2024-10-02 DIAGNOSIS — I25.110 CORONARY ARTERY DISEASE INVOLVING NATIVE CORONARY ARTERY OF NATIVE HEART WITH UNSTABLE ANGINA PECTORIS (HCC): ICD-10-CM

## 2024-10-03 RX ORDER — ASPIRIN 81 MG/1
TABLET, CHEWABLE ORAL
Qty: 90 TABLET | Refills: 1 | Status: SHIPPED | OUTPATIENT
Start: 2024-10-03

## 2024-10-03 NOTE — TELEPHONE ENCOUNTER
Last ov:  05/17/24  Upcoming ov: 11/22/24    BMP  05/04/21  CBC  05/04/21    Called pt regarding getting labs done. Pt stated he is a busy person and does not know when he will have time to get it done. Pt stated if we need labs to refill medications he will get it over the counter.

## 2025-05-16 DIAGNOSIS — I25.110 CORONARY ARTERY DISEASE INVOLVING NATIVE CORONARY ARTERY OF NATIVE HEART WITH UNSTABLE ANGINA PECTORIS (HCC): ICD-10-CM

## 2025-05-19 NOTE — TELEPHONE ENCOUNTER
Last Office Visit: 5/17/2024 Provider: RUMA  **Is provider OOT? No    Next Office Visit: Visit date not found Provider: RUMA  **If no OV, when does pt need to be seen? in 6 month(s)  **Has patient already had 30 day supply? No        Please call to schedule an appointment

## 2025-05-23 RX ORDER — ASPIRIN 81 MG/1
TABLET, CHEWABLE ORAL
Qty: 90 TABLET | Refills: 0 | Status: SHIPPED | OUTPATIENT
Start: 2025-05-23

## 2025-05-23 NOTE — TELEPHONE ENCOUNTER
5.23.25- third attempt  Called pt at [phone], LVM to call and schedule w/ FXW  Mailing letter

## 2025-06-27 DIAGNOSIS — I10 PRIMARY HYPERTENSION: ICD-10-CM

## 2025-06-27 DIAGNOSIS — I25.110 CORONARY ARTERY DISEASE INVOLVING NATIVE CORONARY ARTERY OF NATIVE HEART WITH UNSTABLE ANGINA PECTORIS (HCC): ICD-10-CM

## 2025-06-27 DIAGNOSIS — E78.00 PURE HYPERCHOLESTEROLEMIA: Primary | ICD-10-CM

## 2025-06-27 NOTE — TELEPHONE ENCOUNTER
Last Office Visit: 5/17/2024 Provider: RUMA  **Is provider OOT? No    Next Office Visit: 9/5/2025 Provider: RUMA      LAST LABS:   BMP:  Lab Results   Component Value Date/Time     10/15/2020 07:21 AM    K 4.0 10/15/2020 07:21 AM     10/15/2020 07:21 AM    CO2 27 10/15/2020 07:21 AM    BUN 12 10/15/2020 07:21 AM    CREATININE 1.2 10/15/2020 07:21 AM    GLUCOSE 96 10/15/2020 07:21 AM    CALCIUM 8.8 10/15/2020 07:21 AM    LABGLOM >60 10/15/2020 07:21 AM    LABGLOM >60 05/07/2013 02:55 PM      CMP:  Lab Results   Component Value Date     10/15/2020    K 4.0 10/15/2020     10/15/2020    CO2 27 10/15/2020    BUN 12 10/15/2020    CREATININE 1.2 10/15/2020    GLUCOSE 96 10/15/2020    CALCIUM 8.8 10/15/2020    BILITOT 0.5 10/15/2020    ALKPHOS 73 10/15/2020    AST 18 10/15/2020    ALT 18 10/15/2020    LABGLOM >60 10/15/2020    GFRAA >60 10/15/2020    AGRATIO 1.4 10/15/2020    GLOB 2.7 10/15/2020     Lab orders needed? yes - fasting lipid, cmp, cbc    Left message for pt to call. Please inform fasting labs are needed. Orders placed

## 2025-06-29 RX ORDER — METOPROLOL SUCCINATE 25 MG/1
25 TABLET, EXTENDED RELEASE ORAL DAILY
Qty: 90 TABLET | Refills: 0 | Status: SHIPPED | OUTPATIENT
Start: 2025-06-29

## 2025-08-08 DIAGNOSIS — I10 PRIMARY HYPERTENSION: ICD-10-CM

## 2025-08-08 DIAGNOSIS — I25.110 CORONARY ARTERY DISEASE INVOLVING NATIVE CORONARY ARTERY OF NATIVE HEART WITH UNSTABLE ANGINA PECTORIS (HCC): ICD-10-CM

## 2025-08-14 RX ORDER — METOPROLOL SUCCINATE 25 MG/1
25 TABLET, EXTENDED RELEASE ORAL DAILY
Qty: 90 TABLET | Refills: 0 | Status: SHIPPED | OUTPATIENT
Start: 2025-08-14

## (undated) DEVICE — SYRINGE MED 50ML LUERSLIP TIP

## (undated) DEVICE — ADAPTER TBNG DIA15MM SWVL FBROPT BRONCHSCP TERM 2 AXIS PEEP

## (undated) DEVICE — LINER,SOFT,SUCTION CANISTER,1500CC: Brand: MEDLINE

## (undated) DEVICE — TUBING, SUCTION, 3/16" X 12', STRAIGHT: Brand: MEDLINE

## (undated) DEVICE — ENDO CARRY-ON PROCEDURE KIT INCLUDES SUCTION TUBING, LUBRICANT, GAUZE, BIOHAZARD STICKER, TRANSPORT PAD AND INTERCEPT BEDSIDE KIT.: Brand: ENDO CARRY-ON PROCEDURE KIT

## (undated) DEVICE — SYRINGE MED 10ML SLIP TIP BLNT FILL AND LUERLOCK DISP

## (undated) DEVICE — LENS EYEGLASS LTWT REPL DISP SAFEVIEW

## (undated) DEVICE — BOWL MED M 16OZ PLAS CAP GRAD

## (undated) DEVICE — CONMED SCOPE SAVER BITE BLOCK, 20X27 MM: Brand: SCOPE SAVER

## (undated) DEVICE — ELECTRODE,RADIOTRANSLUCENT,FOAM,3PK: Brand: MEDLINE

## (undated) DEVICE — TUBING, SUCTION, 3/16" X 6', STRAIGHT: Brand: MEDLINE

## (undated) DEVICE — YANKAUER,BULB TIP,W/O VENT,RIGID,STERILE: Brand: MEDLINE

## (undated) DEVICE — TRAP,MUCUS SPECIMEN, 80CC: Brand: MEDLINE

## (undated) DEVICE — SINGLE USE BIOPSY VALVE MAJ-210: Brand: SINGLE USE BIOPSY VALVE (STERILE)

## (undated) DEVICE — SINGLE USE SUCTION VALVE MAJ-209: Brand: SINGLE USE SUCTION VALVE (STERILE)

## (undated) DEVICE — CANNULA,OXY,ADULT,SUPERSOFT,W/7'TUB,SC: Brand: MEDLINE INDUSTRIES, INC.